# Patient Record
Sex: FEMALE | Race: WHITE | NOT HISPANIC OR LATINO | Employment: OTHER | URBAN - METROPOLITAN AREA
[De-identification: names, ages, dates, MRNs, and addresses within clinical notes are randomized per-mention and may not be internally consistent; named-entity substitution may affect disease eponyms.]

---

## 2019-11-19 ENCOUNTER — OFFICE VISIT (OUTPATIENT)
Dept: CARDIAC SURGERY | Facility: CLINIC | Age: 69
End: 2019-11-19

## 2019-11-19 VITALS
BODY MASS INDEX: 21.21 KG/M2 | OXYGEN SATURATION: 98 % | HEART RATE: 78 BPM | TEMPERATURE: 98.8 F | SYSTOLIC BLOOD PRESSURE: 138 MMHG | WEIGHT: 132 LBS | HEIGHT: 66 IN | DIASTOLIC BLOOD PRESSURE: 74 MMHG

## 2019-11-19 DIAGNOSIS — I35.9 AORTIC VALVE DISORDER: Primary | ICD-10-CM

## 2019-11-19 PROCEDURE — 99204 OFFICE O/P NEW MOD 45 MIN: CPT | Performed by: THORACIC SURGERY (CARDIOTHORACIC VASCULAR SURGERY)

## 2019-11-19 RX ORDER — ERGOCALCIFEROL 1.25 MG/1
CAPSULE ORAL
Refills: 2 | COMMUNITY
Start: 2019-11-07 | End: 2019-12-05

## 2019-11-19 RX ORDER — FEXOFENADINE HCL 180 MG/1
180 TABLET ORAL DAILY
COMMUNITY

## 2019-11-19 RX ORDER — IBUPROFEN 200 MG
200 TABLET ORAL
COMMUNITY
End: 2019-12-15

## 2019-11-20 ENCOUNTER — HOSPITAL ENCOUNTER (OUTPATIENT)
Dept: CARDIOLOGY | Facility: HOSPITAL | Age: 69
Discharge: HOME | End: 2019-11-20

## 2019-11-20 DIAGNOSIS — R42 LIGHT HEADEDNESS: ICD-10-CM

## 2019-11-20 DIAGNOSIS — Z00.6 EXAMINATION FOR NORMAL COMPARISON OR CONTROL IN CLINICAL RESEARCH: ICD-10-CM

## 2019-11-20 DIAGNOSIS — I35.9 AORTIC VALVE DISORDER: Primary | ICD-10-CM

## 2019-11-20 DIAGNOSIS — I35.0 AORTIC STENOSIS, SEVERE: ICD-10-CM

## 2019-11-20 NOTE — PROGRESS NOTES
TAVR DORA    Received referral for TAVR work up per Dr. Encinas.  See orders below for CTA, SAW, and carotid as patient has undergone cath and TTE per Dr. José @ [facility].  Spoke with patient to say that I will schedule these and contact her.  She states she was given a TAVR booklet yesterday and his reviewing the information and online data.      Penny JOHN

## 2019-11-22 ENCOUNTER — TELEPHONE (OUTPATIENT)
Dept: CARDIOLOGY | Facility: HOSPITAL | Age: 69
End: 2019-11-22

## 2019-11-22 NOTE — TELEPHONE ENCOUNTER
TAVR APRN    Spoke with patient re: upcoming SAW and CTA TAVR protocol.  Reviewed instructions and check in location/ time (9am) for SAW on 12/6/19 with Dr. Rodney.  NPO after MN and bring .      On 12/18/19 carotid doppler @ 8 am (check in between 7:30 and 7:45 am on third floor [address]/ CTA chest abdomen, and pelvis afterwards.  Stop in main registration to check in after carotid.  May have light breakfast before 8 am but no caffeine.      If above studies are acceptable, then patient will tentatively plan for TAVR next procedure day after CTA which is 1/13/20.  Patient was instructed to call me @ [facility] for new or worsening symptoms in the interim.      Penny JOHN

## 2019-11-25 ENCOUNTER — TELEPHONE (OUTPATIENT)
Dept: CARDIOLOGY | Facility: HOSPITAL | Age: 69
End: 2019-11-25

## 2019-11-25 NOTE — TELEPHONE ENCOUNTER
TAVR APRN    Called Centralized Scheduling to move CTA and Carotid to 12/5 so that patient would be eligible for TAVR 12/16 rather than January.  Will send patient printed schedule of dates/ times/ prep/ and check in locations.  Ms. Hernandez verbalized agreement and understanding

## 2019-12-05 ENCOUNTER — HOSPITAL ENCOUNTER (OUTPATIENT)
Dept: CARDIOLOGY | Facility: HOSPITAL | Age: 69
Discharge: HOME | End: 2019-12-05

## 2019-12-05 ENCOUNTER — OFFICE VISIT (OUTPATIENT)
Dept: CARDIOLOGY | Facility: HOSPITAL | Age: 69
End: 2019-12-05

## 2019-12-05 ENCOUNTER — HOSPITAL ENCOUNTER (OUTPATIENT)
Dept: CT IMAGING | Facility: HOSPITAL | Age: 69
Discharge: HOME | End: 2019-12-05
Admitting: NURSE PRACTITIONER

## 2019-12-05 VITALS
WEIGHT: 133.38 LBS | HEIGHT: 66 IN | HEART RATE: 70 BPM | DIASTOLIC BLOOD PRESSURE: 67 MMHG | BODY MASS INDEX: 21.44 KG/M2 | SYSTOLIC BLOOD PRESSURE: 122 MMHG | OXYGEN SATURATION: 97 % | TEMPERATURE: 98.4 F | RESPIRATION RATE: 18 BRPM

## 2019-12-05 VITALS
WEIGHT: 132.8 LBS | OXYGEN SATURATION: 97 % | HEART RATE: 58 BPM | TEMPERATURE: 97.6 F | BODY MASS INDEX: 21.43 KG/M2 | SYSTOLIC BLOOD PRESSURE: 141 MMHG | RESPIRATION RATE: 16 BRPM | DIASTOLIC BLOOD PRESSURE: 72 MMHG

## 2019-12-05 VITALS
BODY MASS INDEX: 21.21 KG/M2 | WEIGHT: 132 LBS | SYSTOLIC BLOOD PRESSURE: 140 MMHG | HEIGHT: 66 IN | DIASTOLIC BLOOD PRESSURE: 77 MMHG

## 2019-12-05 DIAGNOSIS — R42 LIGHT HEADEDNESS: ICD-10-CM

## 2019-12-05 DIAGNOSIS — I35.0 AORTIC STENOSIS, SEVERE: ICD-10-CM

## 2019-12-05 DIAGNOSIS — I35.9 AORTIC VALVE DISORDER: Primary | ICD-10-CM

## 2019-12-05 DIAGNOSIS — I50.32 CHRONIC DIASTOLIC CONGESTIVE HEART FAILURE (HCC): ICD-10-CM

## 2019-12-05 DIAGNOSIS — I35.0 SEVERE AORTIC STENOSIS: Primary | ICD-10-CM

## 2019-12-05 LAB
BH CV ECHO MEAS - BSA(HAYCOCK): 1.7 M^2
BH CV ECHO MEAS - BSA: 1.7 M^2
BH CV ECHO MEAS - BZI_BMI: 21.3 KILOGRAMS/M^2
BH CV ECHO MEAS - BZI_METRIC_HEIGHT: 167.6 CM
BH CV ECHO MEAS - BZI_METRIC_WEIGHT: 59.9 KG
BH CV XLRA MEAS LEFT CCA RATIO VEL: 84.5 CM/SEC
BH CV XLRA MEAS LEFT DIST CCA EDV: 30 CM/SEC
BH CV XLRA MEAS LEFT DIST CCA PSV: 78.2 CM/SEC
BH CV XLRA MEAS LEFT DIST ICA EDV: 30 CM/SEC
BH CV XLRA MEAS LEFT DIST ICA PSV: 99.2 CM/SEC
BH CV XLRA MEAS LEFT ICA RATIO VEL: 86.6 CM/SEC
BH CV XLRA MEAS LEFT ICA/CCA RATIO: 1
BH CV XLRA MEAS LEFT MID CCA EDV: 27.2 CM/SEC
BH CV XLRA MEAS LEFT MID CCA PSV: 85.2 CM/SEC
BH CV XLRA MEAS LEFT MID ICA EDV: 27.9 CM/SEC
BH CV XLRA MEAS LEFT MID ICA PSV: 78.9 CM/SEC
BH CV XLRA MEAS LEFT PROX CCA EDV: 34.2 CM/SEC
BH CV XLRA MEAS LEFT PROX CCA PSV: 134.8 CM/SEC
BH CV XLRA MEAS LEFT PROX ECA EDV: 23 CM/SEC
BH CV XLRA MEAS LEFT PROX ECA PSV: 83.1 CM/SEC
BH CV XLRA MEAS LEFT PROX ICA EDV: 31.4 CM/SEC
BH CV XLRA MEAS LEFT PROX ICA PSV: 87.3 CM/SEC
BH CV XLRA MEAS LEFT PROX SCLA PSV: 119.4 CM/SEC
BH CV XLRA MEAS LEFT VERTEBRAL A EDV: 30.7 CM/SEC
BH CV XLRA MEAS LEFT VERTEBRAL A PSV: 67 CM/SEC
BH CV XLRA MEAS RIGHT CCA RATIO VEL: 84.5 CM/SEC
BH CV XLRA MEAS RIGHT DIST CCA EDV: 30.7 CM/SEC
BH CV XLRA MEAS RIGHT DIST CCA PSV: 85.2 CM/SEC
BH CV XLRA MEAS RIGHT DIST ICA EDV: 35.6 CM/SEC
BH CV XLRA MEAS RIGHT DIST ICA PSV: 78.9 CM/SEC
BH CV XLRA MEAS RIGHT ICA RATIO VEL: 81.7 CM/SEC
BH CV XLRA MEAS RIGHT ICA/CCA RATIO: 0.97
BH CV XLRA MEAS RIGHT MID CCA EDV: 32.1 CM/SEC
BH CV XLRA MEAS RIGHT MID CCA PSV: 112.4 CM/SEC
BH CV XLRA MEAS RIGHT MID ICA EDV: 37 CM/SEC
BH CV XLRA MEAS RIGHT MID ICA PSV: 104.8 CM/SEC
BH CV XLRA MEAS RIGHT PROX CCA EDV: 38.3 CM/SEC
BH CV XLRA MEAS RIGHT PROX CCA PSV: 152.2 CM/SEC
BH CV XLRA MEAS RIGHT PROX ECA EDV: 21.6 CM/SEC
BH CV XLRA MEAS RIGHT PROX ECA PSV: 87.3 CM/SEC
BH CV XLRA MEAS RIGHT PROX ICA EDV: 32.8 CM/SEC
BH CV XLRA MEAS RIGHT PROX ICA PSV: 82.4 CM/SEC
BH CV XLRA MEAS RIGHT PROX SCLA PSV: 128.9 CM/SEC
BH CV XLRA MEAS RIGHT VERTEBRAL A EDV: 15.7 CM/SEC
BH CV XLRA MEAS RIGHT VERTEBRAL A PSV: 58.1 CM/SEC
RIGHT ARM BP: NORMAL MMHG

## 2019-12-05 PROCEDURE — A9270 NON-COVERED ITEM OR SERVICE: HCPCS | Performed by: RADIOLOGY

## 2019-12-05 PROCEDURE — 0 IOPAMIDOL PER 1 ML: Performed by: NURSE PRACTITIONER

## 2019-12-05 PROCEDURE — 93880 EXTRACRANIAL BILAT STUDY: CPT | Performed by: INTERNAL MEDICINE

## 2019-12-05 PROCEDURE — 99214 OFFICE O/P EST MOD 30 MIN: CPT | Performed by: NURSE PRACTITIONER

## 2019-12-05 PROCEDURE — 74174 CTA ABD&PLVS W/CONTRAST: CPT

## 2019-12-05 PROCEDURE — 63710000001 METOPROLOL TARTRATE 50 MG TABLET: Performed by: RADIOLOGY

## 2019-12-05 PROCEDURE — 71275 CT ANGIOGRAPHY CHEST: CPT

## 2019-12-05 PROCEDURE — 82565 ASSAY OF CREATININE: CPT

## 2019-12-05 PROCEDURE — 93880 EXTRACRANIAL BILAT STUDY: CPT

## 2019-12-05 RX ORDER — SODIUM CHLORIDE 0.9 % (FLUSH) 0.9 %
10 SYRINGE (ML) INJECTION AS NEEDED
Status: DISCONTINUED | OUTPATIENT
Start: 2019-12-05 | End: 2019-12-06 | Stop reason: HOSPADM

## 2019-12-05 RX ORDER — NITROGLYCERIN 0.4 MG/1
0.4 TABLET SUBLINGUAL
Status: DISCONTINUED | OUTPATIENT
Start: 2019-12-05 | End: 2019-12-06 | Stop reason: HOSPADM

## 2019-12-05 RX ORDER — METOPROLOL TARTRATE 100 MG/1
100 TABLET ORAL ONCE AS NEEDED
Status: COMPLETED | OUTPATIENT
Start: 2019-12-05 | End: 2019-12-05

## 2019-12-05 RX ORDER — METOPROLOL TARTRATE 50 MG/1
50 TABLET, FILM COATED ORAL ONCE AS NEEDED
Status: COMPLETED | OUTPATIENT
Start: 2019-12-05 | End: 2019-12-05

## 2019-12-05 RX ORDER — METOPROLOL TARTRATE 5 MG/5ML
5 INJECTION INTRAVENOUS
Status: DISCONTINUED | OUTPATIENT
Start: 2019-12-05 | End: 2019-12-06 | Stop reason: HOSPADM

## 2019-12-05 RX ADMIN — IOPAMIDOL 85 ML: 755 INJECTION, SOLUTION INTRAVENOUS at 10:04

## 2019-12-05 RX ADMIN — METOPROLOL TARTRATE 50 MG: 50 TABLET, FILM COATED ORAL at 08:56

## 2019-12-05 NOTE — NURSING NOTE
Returned to room. Tolerated procedure well. Instructed to drink at least 8 - 10 glasses of fluid per day for the next few days. Call placed to JUANA Lux for further instructions.

## 2019-12-06 ENCOUNTER — HOSPITAL ENCOUNTER (OUTPATIENT)
Dept: CARDIOLOGY | Facility: HOSPITAL | Age: 69
Discharge: HOME | End: 2019-12-06
Admitting: NURSE PRACTITIONER

## 2019-12-06 VITALS
DIASTOLIC BLOOD PRESSURE: 70 MMHG | RESPIRATION RATE: 18 BRPM | OXYGEN SATURATION: 94 % | SYSTOLIC BLOOD PRESSURE: 114 MMHG | HEART RATE: 71 BPM

## 2019-12-06 DIAGNOSIS — R42 LIGHT HEADEDNESS: ICD-10-CM

## 2019-12-06 DIAGNOSIS — I35.0 AORTIC STENOSIS, SEVERE: ICD-10-CM

## 2019-12-06 LAB
AORTIC ROOT ANNULUS: 2.5 CM
ASCENDING AORTA: 3.78 CM
BH CV ECHO MEAS - AO MAX PG (FULL): 113.8 MMHG
BH CV ECHO MEAS - AO MAX PG: 118 MMHG
BH CV ECHO MEAS - AO MEAN PG (FULL): 68.6 MMHG
BH CV ECHO MEAS - AO MEAN PG: 71.2 MMHG
BH CV ECHO MEAS - AO ROOT AREA (BSA CORRECTED): 0.3 CM2
BH CV ECHO MEAS - AO V2 MAX: 543 CM/SEC
BH CV ECHO MEAS - AO V2 MEAN: 392.5 CM/SEC
BH CV ECHO MEAS - AO V2 VTI: 127.1 CM
BH CV ECHO MEAS - AVA(I,A): 0.5 CM^2
BH CV ECHO MEAS - AVA(I,D): 0.5 CM^2
BH CV ECHO MEAS - AVA(V,A): 0.56 CM^2
BH CV ECHO MEAS - AVA(V,D): 0.56 CM^2
BH CV ECHO MEAS - BSA(HAYCOCK): 1.7 M^2
BH CV ECHO MEAS - BSA: 1.7 M^2
BH CV ECHO MEAS - BZI_BMI: 21.5 KILOGRAMS/M^2
BH CV ECHO MEAS - BZI_METRIC_HEIGHT: 167.6 CM
BH CV ECHO MEAS - BZI_METRIC_WEIGHT: 60.3 KG
BH CV ECHO MEAS - LV MAX PG: 4.2 MMHG
BH CV ECHO MEAS - LV MEAN PG: 2.6 MMHG
BH CV ECHO MEAS - LV V1 MAX: 102 CM/SEC
BH CV ECHO MEAS - LV V1 MEAN: 75.8 CM/SEC
BH CV ECHO MEAS - LV V1 VTI: 21.6 CM
BH CV ECHO MEAS - LVOT AREA (M): 2.8 CM^2
BH CV ECHO MEAS - LVOT AREA: 3 CM^2
BH CV ECHO MEAS - LVOT DIAM: 1.9 CM
BH CV ECHO MEAS - SI(LVOT): 38.1 ML/M^2
BH CV ECHO MEAS - SV(LVOT): 64 ML
CREAT BLDA-MCNC: 0.8 MG/DL (ref 0.6–1.3)
LV EF 2D ECHO EST: 60 %
SINUS: 3.21 CM
STJ: 2.45 CM

## 2019-12-06 PROCEDURE — 93312 ECHO TRANSESOPHAGEAL: CPT

## 2019-12-06 PROCEDURE — 25010000002 MIDAZOLAM PER 1 MG: Performed by: INTERNAL MEDICINE

## 2019-12-06 PROCEDURE — 93312 ECHO TRANSESOPHAGEAL: CPT | Performed by: INTERNAL MEDICINE

## 2019-12-06 PROCEDURE — 93321 DOPPLER ECHO F-UP/LMTD STD: CPT

## 2019-12-06 PROCEDURE — 99152 MOD SED SAME PHYS/QHP 5/>YRS: CPT

## 2019-12-06 PROCEDURE — 93325 DOPPLER ECHO COLOR FLOW MAPG: CPT

## 2019-12-06 PROCEDURE — 99153 MOD SED SAME PHYS/QHP EA: CPT

## 2019-12-06 PROCEDURE — 25010000002 FENTANYL CITRATE (PF) 100 MCG/2ML SOLUTION: Performed by: INTERNAL MEDICINE

## 2019-12-06 PROCEDURE — 93321 DOPPLER ECHO F-UP/LMTD STD: CPT | Performed by: INTERNAL MEDICINE

## 2019-12-06 PROCEDURE — 93325 DOPPLER ECHO COLOR FLOW MAPG: CPT | Performed by: INTERNAL MEDICINE

## 2019-12-06 PROCEDURE — 99152 MOD SED SAME PHYS/QHP 5/>YRS: CPT | Performed by: INTERNAL MEDICINE

## 2019-12-06 RX ORDER — MIDAZOLAM HYDROCHLORIDE 1 MG/ML
INJECTION INTRAMUSCULAR; INTRAVENOUS
Status: COMPLETED | OUTPATIENT
Start: 2019-12-06 | End: 2019-12-06

## 2019-12-06 RX ORDER — FENTANYL CITRATE 50 UG/ML
INJECTION, SOLUTION INTRAMUSCULAR; INTRAVENOUS
Status: COMPLETED | OUTPATIENT
Start: 2019-12-06 | End: 2019-12-06

## 2019-12-06 RX ADMIN — MIDAZOLAM HYDROCHLORIDE 2 MG: 1 INJECTION, SOLUTION INTRAMUSCULAR; INTRAVENOUS at 10:09

## 2019-12-06 RX ADMIN — METHOHEXITAL SODIUM 10 MG: 500 INJECTION, POWDER, LYOPHILIZED, FOR SOLUTION INTRAMUSCULAR; INTRAVENOUS; RECTAL at 10:32

## 2019-12-06 RX ADMIN — FENTANYL CITRATE 50 MCG: 50 INJECTION, SOLUTION INTRAMUSCULAR; INTRAVENOUS at 10:12

## 2019-12-06 RX ADMIN — METHOHEXITAL SODIUM 10 MG: 500 INJECTION, POWDER, LYOPHILIZED, FOR SOLUTION INTRAMUSCULAR; INTRAVENOUS; RECTAL at 10:17

## 2019-12-06 RX ADMIN — FENTANYL CITRATE 50 MCG: 50 INJECTION, SOLUTION INTRAMUSCULAR; INTRAVENOUS at 10:09

## 2019-12-06 RX ADMIN — MIDAZOLAM HYDROCHLORIDE 2 MG: 1 INJECTION, SOLUTION INTRAMUSCULAR; INTRAVENOUS at 10:12

## 2019-12-06 NOTE — H&P
Pre-procedure History and Physical  [facility]      Patient:  Albania Hernandez  :  1950  MRN: [id number]    PCP:  Kimberly Anglin FNP  PHONE:  [phone]  Cardiologist: Dr. Merrill Trent ([facility])    DATE: 2019  ID: Albania Hernandez is a 69 y.o. female resident of [place], [place]     CC: Severe AS    PROBLEM LIST:   1. Severe aortic stenosis  A. LHC 2019 [facility]: nonobstructive CAD with 20-30% mid LAD lesion   B. Echo 10/31/2019: mild LVH, EF 55-60%, mild mR, calcified AV, severe AS with BALDO =0.62cm2, mean gradient 70mmHg, max gradient 120mmHg, trace AI  C. Class II VELAZQUEZ  2. History of hiatal hernia, s/p repair   3. Arthritis  4. Osteoporosis      BRIEF HPI: Mrs. Hernandez is a pleasant 68 y/o WF with recently diagnosed severe AS. She reports she has been told of a murmur for 45 +years, however has never had an echo done until recently. She has been asymptomatic, however does report class II exertional dyspnea and fatigue. 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 She denies chest pain, syncope or pre-syncope, no heart failure symptoms. She was evaluated by Dr. Trent at [facility] and an echo showed severe AS as noted above. She underwent cardiac catheterization which showed nonobstructive CAD, and was referred to Dr. Encinas for AVR. She was given the option of surgical replacement vs TAVR and opted for the latter. She presents for SAW today as part of TAVR work-up. No tobacco, alcohol or drug use. No history of MI, CVA, DVT/PE or rheumatic fever. Family history is significant for CAD in her siblings and father.     Cardiac Risk Factors: advanced age (older than 55 for men, 65 for women) and family history of premature cardiovascular disease    Allergies:      No Known Allergies    MEDICATIONS:  Current Outpatient Medications on File Prior to Encounter   Medication Sig   • calcium acetate (PHOSLO) 667 MG capsule Take 1,200 mg by mouth.   •  fexofenadine (ALLEGRA) 180 MG tablet Take 180 mg by mouth.   • ibuprofen (ADVIL,MOTRIN) 200 MG tablet Take 200 mg by mouth.       Past medical & surgical history, social and family history reviewed in the electronic medical record.    ROS: Pertinent positives listed in the HPI and problem list above. All others reviewed and negative.     Physical Exam:  VS: /92 left arm sitting, 135/76 right arm sitting, Pulse 69   Resp 20   SpO2 95%     Constitutional:    Alert, cooperative, in no acute distress   Neck:     No Jugular venous distention, adenopathy, or thyromegaly noted.     Heart:    Regular rhythm and normal rate, normal S1 and S2, 3/6 systolic murmur, no gallops, rubs, or clicks. No distinct PMI noted.    Lungs:     Clear to auscultation bilaterally, respirations regular, even     and unlabored    Abdomen:     Soft non-tender, non-distended, normal bowel sounds, no masses or organomegaly   Extremities:   No gross deformities, no edema, clubbing, or cyanosis.    Pulses:   Peripheral pulses palpable and equal bilaterally.       Labs and Diagnostic Data:  Results from last 7 days   Lab Units 12/05/19  0915   CREATININE mg/dL 0.80         No results found for: CHOL, TRIG, HDL, LDL, AST, ALT              Tele: SR    IMPRESSION:  · 68 y/o WF with no significant medical history and lifelong cardiac murmur with recent diagnosis of severe AS. She presents for SAW today as part of TAVR work-up.     PLAN:  · Procedure to perform: SAW per Dr. Rodney.  Risks, benefits and alternatives to the procedure explained to the patient and she understands and wishes to proceed.     Electronically signed by Essence Raphael PA-C, 12/06/19, 9:58 AM.    Veronica Rodney MD, [facility]

## 2019-12-09 ENCOUNTER — PREP FOR SURGERY (OUTPATIENT)
Dept: OTHER | Facility: HOSPITAL | Age: 69
End: 2019-12-09

## 2019-12-09 DIAGNOSIS — I35.9 AORTIC VALVE DISORDER: Primary | ICD-10-CM

## 2019-12-09 RX ORDER — ACETAMINOPHEN 325 MG/1
650 TABLET ORAL EVERY 4 HOURS PRN
Status: CANCELLED | OUTPATIENT
Start: 2019-12-16

## 2019-12-09 RX ORDER — ASPIRIN 325 MG
325 TABLET ORAL NIGHTLY
Status: CANCELLED | OUTPATIENT
Start: 2019-12-15 | End: 2019-12-16

## 2019-12-09 RX ORDER — CHLORHEXIDINE GLUCONATE 0.12 MG/ML
15 RINSE ORAL ONCE
Status: CANCELLED | OUTPATIENT
Start: 2019-12-16 | End: 2019-12-16

## 2019-12-09 RX ORDER — CHLORHEXIDINE GLUCONATE 500 MG/1
1 CLOTH TOPICAL EVERY 12 HOURS PRN
Status: CANCELLED | OUTPATIENT
Start: 2019-12-16

## 2019-12-09 RX ORDER — NITROGLYCERIN 0.4 MG/1
0.4 TABLET SUBLINGUAL
Status: CANCELLED | OUTPATIENT
Start: 2019-12-16

## 2019-12-09 RX ORDER — CHLORHEXIDINE GLUCONATE 500 MG/1
1 CLOTH TOPICAL EVERY 12 HOURS PRN
Status: CANCELLED | OUTPATIENT
Start: 2019-12-15

## 2019-12-10 PROBLEM — I50.32 CHRONIC DIASTOLIC CONGESTIVE HEART FAILURE: Status: ACTIVE | Noted: 2019-12-10

## 2019-12-10 PROBLEM — I35.0 SEVERE AORTIC STENOSIS: Status: ACTIVE | Noted: 2019-11-19

## 2019-12-10 NOTE — PROGRESS NOTES
"TAVR APRN Evaluation    Albania Hernandez, 1950, [id number]     12/10/19    PCP: Kimberly Anglin FNP  Primary Cardiologist: Camilo Trent MD    TAVR Team:  1.  Major Rodríguez MD  2.  Pierce Encinas MD  3.  Veronica Rodney MD  4.  Augustin aBker MD    Chief Complaint: Establish Care (TVAR Consult)      Identification: This is a 69 y.o. year old female from [address] [place] [address] [place] [id number].    History of Present Illness: Ms. Hernandez was referred for consideration of TAVR due to severe aortic stenosis noted per echocardiogram.  Patient states she visited her PCP several weeks ago and was asked when she last saw her Cardiologist.  She stated she has never seen cardiology and had been told she had an \"innocent murmur\" many years ago by OB/GYN.  Her symptoms include fatigue and mild exertional dyspnea.  Her aortic valve indices are as follows:  BALDO 0.6 cm2, Aortic valve Vmax 5.3 m/sec, and AV mean gradient 79 mm Hg.    PCP referred her to Dr. Camilo Trent @ [facility].  Cardiac catheter revealed normal coronary arteries.  Aortic valve assessment at cath indicated BALDO 0.6 cm2, AV mean >55 mm Hg.  Patient's best friend had an procedure earlier this year per Dr. Encinas and she requested to have a consultation with him.  Earlier today she completed her CTA and is scheduled for her SAW in the morning.  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  After a busy day working she \"falls asleep\".      Problem List:   Patient Active Problem List   Diagnosis   • Severe aortic stenosis   • Chronic diastolic congestive heart failure (CMS/HCC)       Past Surgical History:  Past Surgical History:   Procedure Laterality Date   • COLONOSCOPY W/ BIOPSIES     • HERNIA REPAIR     • TUBAL ABDOMINAL LIGATION         Allergies:  Patient has no known allergies.    Social History:  Socioeconomic History   • Marital status:    • Number of " children: 2   • Highest education level: Master's degree   Occupational History   • Occupation: Teacher     Comment: retired  from [employer]   Tobacco Use   • Smoking status: Never Smoker   • Smokeless tobacco: Never Used   Substance and Sexual Activity   • Alcohol use: No     Frequency: Never   • Drug use: No   • Sexual activity: Defer   Social History Narrative    Lifelong resident of  [facility]    Caffeine use:  Coke 12oz cans/ two daily       Current Medications:  •  calcium acetate (PHOSLO) 667 MG capsule, Take 1,200 mg by mouth., Disp: , Rfl:   •  fexofenadine (ALLEGRA) 180 MG tablet, Take 180 mg by mouth., Disp: , Rfl:   •  ibuprofen (ADVIL,MOTRIN) 200 MG tablet, Take 200 mg by mouth., Disp: , Rfl:     Review of Systems:  Review of Systems   Constitution: Positive for malaise/fatigue.   HENT: Negative.    Eyes: Negative.    Cardiovascular: Positive for dyspnea on exertion.   Respiratory: Negative.    Endocrine: Positive for cold intolerance.   Hematologic/Lymphatic: Bruises/bleeds easily.   Skin: Negative.    Musculoskeletal: Negative.    Gastrointestinal: Negative.    Genitourinary: Negative.    Neurological: Negative.    Psychiatric/Behavioral: Negative.    Allergic/Immunologic: Negative.         Physical Exam:  Physical Exam   Constitutional: She is oriented to person, place, and time. She appears well-developed and well-nourished. No distress.   Petite, well groomed talkative middle age female in NAD   HENT:   Head: Normocephalic and atraumatic.   Eyes: Pupils are equal, round, and reactive to light. Conjunctivae are normal. No scleral icterus.   Neck: Normal range of motion. Neck supple. No JVD present. No thyromegaly present.   Cardiac murmur radiates to both carotids   Cardiovascular: Normal rate, regular rhythm and intact distal pulses. Exam reveals no gallop and no friction rub.   Murmur heard.  Harsh IV/VI systolic murmur LUSB   Pulmonary/Chest: Effort  "normal and breath sounds normal. No respiratory distress. She has no wheezes. She has no rales.   Abdominal: Soft. Bowel sounds are normal.   Musculoskeletal: Normal range of motion. She exhibits no edema or deformity.   Neurological: She is alert and oriented to person, place, and time. No cranial nerve deficit.   Skin: Skin is warm and dry.   Psychiatric: She has a normal mood and affect. Her behavior is normal. Judgment and thought content normal.   Vitals reviewed.      Vitals:    12/05/19 1156 12/05/19 1202 12/05/19 1203   BP: 130/69 125/66 122/67   BP Location: Right arm Left arm Left arm   Patient Position: Sitting Sitting Standing   Cuff Size: Adult Adult Adult   Pulse: 67  70   Resp: 18     Temp: 98.4 °F (36.9 °C)     TempSrc: Temporal     SpO2: 99%  97%   Weight: 60.5 kg (133 lb 6 oz)     Height: 167.6 cm (66\")         Diagnostic Data:  Transthoracic echo: 10/31/19 as noted above in HPI    Transesophageal echo: 12/6/19     · Estimated EF = 60%.  · Left ventricular systolic function is normal.  · Severe aortic valve stenosis is present.  · Aortic valve mean pressure gradient is 71.2 mmHg.  · Aortic valve area is 0.5 cm2.     Cardiac Cath: 11/13/19 per Dr. Camilo Trent @ [facility].  Non-critical, non-obstructive CAD in mid LAD with focal 20-30% stenosis    CTA Chest, Abdomen, and Pelvis: pending    Carotid Doppler: no hemodynamically significant stenosis bilaterally      Functional Assessment Data:    KCCQ12 Questionnaire Score: (see scanned copy) 61/70      [place] Basic Activities of Daily Living (ADL) Scale    Bathing (sponge bath, tub bath, or shower).  Receives either no assistance,   or assistance with bathing only on body part.   Yes    Dressing Gets clothes and dresses without any assistance, except for  tying shoes.        Yes    Toileting Goes to toilet room, uses toilet, arranges clothes, and returns  without any assistance (may use cane or walker for support and may use  bedpan / urinal at " night.      Yes    Transferring Moves into and out of bed and chair without assistance  (may use cane or walker)      Yes    Continence Controls bowel and bladder completely without occasional  accidents        Yes    Feeding Feeds self without assistance (except for help with cutting meat  or buttering bread)       Yes        Total (Number of Yesses of 6) 6/6      [place] Instrumental Activities of Daily Living Scale (IADL)    Ability to Use Telephone   · Operates telephone on own initiative.  Looks up and dials numbers, etc.         1  Shopping  · Takes care of all shopping needs indepently  1    Food Preparation  · Plans, prepares, and serves adequate meals independently          1  Housekeeping  · Maintains house alone or with occasional assistance,   e.g. heavy work domestic help    1     Laundry  Does personal laundry completely   1     Mode of Transportation  · Travels independently on public transportation or drives own car          1  Responsibility for Own Medications  · Is responsible for taking medications in correct dosages at correct time          1  Ability to Handle Finances  · Manages financial matters independently (budgets, writes checks,   pays rent, bills, goes to bank), collects and keeps track of income          1     Total (Number of Instrumental Activities of 8) 8/8       Frailty Assessment Utilizing the Hydraulic Hand Dynamometer    Dominate Hand: Right    Trail 1:  24 kg    Trial 2: 24 kg    Trial 3: 22 kg    Mean  Strength in Dominate Hand: 23.3 kg (mean age/gender 15-30 kg)      Female Scores  Male Scores   Age Group Right Hand Left Hand  Age Group Right Hand Left Hand   18-19 23-42 19-37  18-19 31-67 21-63   20-24 21-43 18-37  20-24 40-70 31-64   25-29 23-44 20-38  25-29 38-72 38-62   30-34 21-50 18-44  30-34 38-72 34-66   35-39 26-42 21-39  35-39 36-72 35-67   40-44 22-42 18-39  40-44 37-69 37-65   45-49 17-39 16-35  45-49 33-67 29-63   50-54 21-39 18-34  50-54 38-65 33-59    55-59 17-35 13-30  55-59 26-66 20-55   60-64 17-33 13-28  60-64 25-56 20-50   65-69 15-30 12-25  65-69 26-57 20-50   70-74 14-31 11-26  70-74 18-50 16-43   75 11-28 11-24  75 14-45 12-38       Five Meter Walk Test    Utilized Walking Aid? No     Walk 1: 4.7 s/5m     Walk 2: 4.3 s/5m     Walk 3: 5.03 s/5m    Five Meter Walk Average: 4.7 s/5m    Gait Speed: Normal (Average < or = 6 s/5m)      STS risk of mortality with open AVR: 0.67%  http://riskcalc.sts.org/stswebriskcalc/calculate       Creatinine Clearance: 63 ml/min  https://reference.[employer]/calculator/creatinine-clearance-cockcroft-gault    Assessment/ Plan:     ICD-10-CM ICD-9-CM   1. Severe aortic stenosis I35.0 424.1   2. Chronic diastolic congestive heart failure (CMS/HCC).  NYHA class I I50.32 428.32     428.0     TAVR education materials reviewed with patient today.  This included printed brochure detailing pathophysiology of aortic stenosis, patient specific aortic stenosis symptoms, and treatment options (medical therapy, surgical aortic valve replacement, and transcatheter aortic valve replacement).  A model of the valve and procedural animation were also used to explain TAVR.  We discussed patient's goals of care:  Continue living and working to support granddaughter and care for aging mother.      Our talk also included procedural details, procedure risks, anticipated pre-op and post-op expectations, as well as follow up visit schedule @ one month and one year.  Further discussion and final decision making will occur with Multi- Disciplinary Heart Team.  Anticipated procedure date is 12/16/19     DORA Copeland, 12/10/19, 3:18 PM

## 2019-12-13 ENCOUNTER — TELEPHONE (OUTPATIENT)
Dept: CARDIOLOGY | Facility: HOSPITAL | Age: 69
End: 2019-12-13

## 2019-12-13 NOTE — TELEPHONE ENCOUNTER
Attempted to call patient both at home and on mobile to review PAT appt and answer questions regarding TAVR. Left voicemail.

## 2019-12-13 NOTE — TELEPHONE ENCOUNTER
Returned patient's call. Reviewed pre TAVR expectations. Confirmed date for PAT 12/15/19 at 11.45 am

## 2019-12-15 ENCOUNTER — APPOINTMENT (OUTPATIENT)
Dept: PREADMISSION TESTING | Facility: HOSPITAL | Age: 69
End: 2019-12-15

## 2019-12-15 ENCOUNTER — HOSPITAL ENCOUNTER (OUTPATIENT)
Dept: GENERAL RADIOLOGY | Facility: HOSPITAL | Age: 69
Discharge: HOME | End: 2019-12-15

## 2019-12-15 ENCOUNTER — HOSPITAL ENCOUNTER (OUTPATIENT)
Dept: PULMONOLOGY | Facility: HOSPITAL | Age: 69
Discharge: HOME | End: 2019-12-15
Admitting: PHYSICIAN ASSISTANT

## 2019-12-15 VITALS — HEIGHT: 66 IN | BODY MASS INDEX: 21.51 KG/M2 | OXYGEN SATURATION: 98 % | WEIGHT: 133.82 LBS

## 2019-12-15 DIAGNOSIS — I50.32 CHRONIC DIASTOLIC CONGESTIVE HEART FAILURE (HCC): ICD-10-CM

## 2019-12-15 DIAGNOSIS — I35.9 AORTIC VALVE DISORDER: ICD-10-CM

## 2019-12-15 LAB
ABO GROUP BLD: NORMAL
ALBUMIN SERPL-MCNC: 4.4 G/DL (ref 3.5–5.2)
ALBUMIN/GLOB SERPL: 2 G/DL
ALP SERPL-CCNC: 60 U/L (ref 39–117)
ALT SERPL W P-5'-P-CCNC: 13 U/L (ref 1–33)
AMPHET+METHAMPHET UR QL: NEGATIVE
AMPHETAMINES UR QL: NEGATIVE
ANION GAP SERPL CALCULATED.3IONS-SCNC: 10 MMOL/L (ref 5–15)
APTT PPP: 27.5 SECONDS (ref 24–37)
AST SERPL-CCNC: 14 U/L (ref 1–32)
BARBITURATES UR QL SCN: NEGATIVE
BASOPHILS # BLD AUTO: 0.03 10*3/MM3 (ref 0–0.2)
BASOPHILS NFR BLD AUTO: 0.7 % (ref 0–1.5)
BENZODIAZ UR QL SCN: NEGATIVE
BILIRUB SERPL-MCNC: 0.4 MG/DL (ref 0.2–1.2)
BLD GP AB SCN SERPL QL: NEGATIVE
BUN BLD-MCNC: 11 MG/DL (ref 8–23)
BUN/CREAT SERPL: 14.7 (ref 7–25)
BUPRENORPHINE SERPL-MCNC: NEGATIVE NG/ML
CALCIUM SPEC-SCNC: 9.6 MG/DL (ref 8.6–10.5)
CANNABINOIDS SERPL QL: NEGATIVE
CHLORIDE SERPL-SCNC: 107 MMOL/L (ref 98–107)
CO2 SERPL-SCNC: 26 MMOL/L (ref 22–29)
COCAINE UR QL: NEGATIVE
CREAT BLD-MCNC: 0.75 MG/DL (ref 0.57–1)
DEPRECATED RDW RBC AUTO: 43.2 FL (ref 37–54)
EOSINOPHIL # BLD AUTO: 0.09 10*3/MM3 (ref 0–0.4)
EOSINOPHIL NFR BLD AUTO: 2.1 % (ref 0.3–6.2)
ERYTHROCYTE [DISTWIDTH] IN BLOOD BY AUTOMATED COUNT: 12.8 % (ref 12.3–15.4)
GFR SERPL CREATININE-BSD FRML MDRD: 77 ML/MIN/1.73
GLOBULIN UR ELPH-MCNC: 2.2 GM/DL
GLUCOSE BLD-MCNC: 79 MG/DL (ref 65–99)
HBA1C MFR BLD: 5.4 % (ref 4.8–5.6)
HCT VFR BLD AUTO: 39.4 % (ref 34–46.6)
HGB BLD-MCNC: 12.7 G/DL (ref 12–15.9)
IMM GRANULOCYTES # BLD AUTO: 0.01 10*3/MM3 (ref 0–0.05)
IMM GRANULOCYTES NFR BLD AUTO: 0.2 % (ref 0–0.5)
INR PPP: 0.97 (ref 0.85–1.16)
LYMPHOCYTES # BLD AUTO: 1.46 10*3/MM3 (ref 0.7–3.1)
LYMPHOCYTES NFR BLD AUTO: 34.8 % (ref 19.6–45.3)
MAGNESIUM SERPL-MCNC: 2.1 MG/DL (ref 1.6–2.4)
MCH RBC QN AUTO: 29.7 PG (ref 26.6–33)
MCHC RBC AUTO-ENTMCNC: 32.2 G/DL (ref 31.5–35.7)
MCV RBC AUTO: 92.3 FL (ref 79–97)
METHADONE UR QL SCN: NEGATIVE
MONOCYTES # BLD AUTO: 0.37 10*3/MM3 (ref 0.1–0.9)
MONOCYTES NFR BLD AUTO: 8.8 % (ref 5–12)
NEUTROPHILS # BLD AUTO: 2.24 10*3/MM3 (ref 1.7–7)
NEUTROPHILS NFR BLD AUTO: 53.4 % (ref 42.7–76)
NRBC BLD AUTO-RTO: 0 /100 WBC (ref 0–0.2)
OPIATES UR QL: NEGATIVE
OXYCODONE UR QL SCN: NEGATIVE
PA ADP PRP-ACNC: 297 PRU
PCP UR QL SCN: NEGATIVE
PLATELET # BLD AUTO: 195 10*3/MM3 (ref 140–450)
PMV BLD AUTO: 11.4 FL (ref 6–12)
POTASSIUM BLD-SCNC: 3.5 MMOL/L (ref 3.5–5.2)
PROPOXYPH UR QL: NEGATIVE
PROT SERPL-MCNC: 6.6 G/DL (ref 6–8.5)
PROTHROMBIN TIME: 12.4 SECONDS (ref 11.2–14.3)
RBC # BLD AUTO: 4.27 10*6/MM3 (ref 3.77–5.28)
RH BLD: NEGATIVE
SODIUM BLD-SCNC: 143 MMOL/L (ref 136–145)
T&S EXPIRATION DATE: NORMAL
TRICYCLICS UR QL SCN: NEGATIVE
WBC NRBC COR # BLD: 4.2 10*3/MM3 (ref 3.4–10.8)

## 2019-12-15 PROCEDURE — 71046 X-RAY EXAM CHEST 2 VIEWS: CPT

## 2019-12-15 PROCEDURE — 86901 BLOOD TYPING SEROLOGIC RH(D): CPT | Performed by: PHYSICIAN ASSISTANT

## 2019-12-15 PROCEDURE — 93005 ELECTROCARDIOGRAM TRACING: CPT

## 2019-12-15 PROCEDURE — 83036 HEMOGLOBIN GLYCOSYLATED A1C: CPT | Performed by: PHYSICIAN ASSISTANT

## 2019-12-15 PROCEDURE — 94010 BREATHING CAPACITY TEST: CPT | Performed by: INTERNAL MEDICINE

## 2019-12-15 PROCEDURE — 36415 COLL VENOUS BLD VENIPUNCTURE: CPT

## 2019-12-15 PROCEDURE — 86850 RBC ANTIBODY SCREEN: CPT | Performed by: PHYSICIAN ASSISTANT

## 2019-12-15 PROCEDURE — 85610 PROTHROMBIN TIME: CPT | Performed by: PHYSICIAN ASSISTANT

## 2019-12-15 PROCEDURE — 83880 ASSAY OF NATRIURETIC PEPTIDE: CPT | Performed by: PHYSICIAN ASSISTANT

## 2019-12-15 PROCEDURE — 86900 BLOOD TYPING SEROLOGIC ABO: CPT | Performed by: PHYSICIAN ASSISTANT

## 2019-12-15 PROCEDURE — 85576 BLOOD PLATELET AGGREGATION: CPT | Performed by: PHYSICIAN ASSISTANT

## 2019-12-15 PROCEDURE — 85730 THROMBOPLASTIN TIME PARTIAL: CPT | Performed by: PHYSICIAN ASSISTANT

## 2019-12-15 PROCEDURE — 85025 COMPLETE CBC W/AUTO DIFF WBC: CPT | Performed by: PHYSICIAN ASSISTANT

## 2019-12-15 PROCEDURE — 93010 ELECTROCARDIOGRAM REPORT: CPT | Performed by: INTERNAL MEDICINE

## 2019-12-15 PROCEDURE — 86923 COMPATIBILITY TEST ELECTRIC: CPT

## 2019-12-15 PROCEDURE — 94010 BREATHING CAPACITY TEST: CPT

## 2019-12-15 PROCEDURE — 80307 DRUG TEST PRSMV CHEM ANLYZR: CPT | Performed by: PHYSICIAN ASSISTANT

## 2019-12-15 PROCEDURE — 83735 ASSAY OF MAGNESIUM: CPT | Performed by: PHYSICIAN ASSISTANT

## 2019-12-15 PROCEDURE — 80053 COMPREHEN METABOLIC PANEL: CPT | Performed by: PHYSICIAN ASSISTANT

## 2019-12-15 NOTE — PAT
Instructed patient to take 325 mg the night before heart surgery as per CT surgeon's order.  Patient and/or family verbalized understanding.    BACTROBAN APPLIED TO EACH NOSTRIL DURING PAT VISIT     Patient to apply Chlorhexadine wipes  to surgical area (as instructed) the night before procedure and the AM of procedure. Wipes provided.    It was noted during Pre Admission Testing that patient was wearing some form of fingernail polish (gel/regular) and/or acrylic/artificial nails.  Patient was told that polish and/or artificial nails must be removed for surgery.  If a patient had recent manicure, and would rather not remove polish or artificial nails. Then the minimum requirement is that the polish/artificial nails must be removed from the middle finger on each hand.  Patient verbalized understanding.    If patient was having surgery on an upper extremity, then the patient was instructed that fingernail polish/artificial fingernails must be removed for surgery.  NO EXCEPTIONS.  Patient verbalized understanding.    If patient was having surgery on a lower extremity, then the patient was instructed that toenail polish on both extremities must be removed for surgery.  NO EXCEPTIONS. Patient verbalized understanding.    Blood bank bracelet applied to patient during Pre Admission Testing visit.  Patient instructed not to remove from arm until after procedure and they are discharged from the hospital.  Explained to patient that they may shower and get the bracelet wet, but not to immerse under water for longer periods (bathing, swimming, hand dishwashing, etc).  Patient verbalized understanding.    RT to PAT for PFT's.    Patient to radiology following PAT for CXR.

## 2019-12-16 ENCOUNTER — ANESTHESIA (OUTPATIENT)
Dept: PERIOP | Facility: HOSPITAL | Age: 69
End: 2019-12-16

## 2019-12-16 ENCOUNTER — ANESTHESIA EVENT (OUTPATIENT)
Dept: PERIOP | Facility: HOSPITAL | Age: 69
End: 2019-12-16

## 2019-12-16 ENCOUNTER — ANCILLARY PROCEDURE (OUTPATIENT)
Dept: PERIOP | Facility: HOSPITAL | Age: 69
End: 2019-12-16

## 2019-12-16 ENCOUNTER — APPOINTMENT (OUTPATIENT)
Dept: GENERAL RADIOLOGY | Facility: HOSPITAL | Age: 69
End: 2019-12-16

## 2019-12-16 ENCOUNTER — HOSPITAL ENCOUNTER (INPATIENT)
Facility: HOSPITAL | Age: 69
LOS: 1 days | Discharge: HOME | End: 2019-12-17
Attending: THORACIC SURGERY (CARDIOTHORACIC VASCULAR SURGERY) | Admitting: THORACIC SURGERY (CARDIOTHORACIC VASCULAR SURGERY)

## 2019-12-16 DIAGNOSIS — I35.0 SEVERE AORTIC STENOSIS: Primary | ICD-10-CM

## 2019-12-16 DIAGNOSIS — I35.9 AORTIC VALVE DISORDER: ICD-10-CM

## 2019-12-16 DIAGNOSIS — I50.32 CHRONIC DIASTOLIC CONGESTIVE HEART FAILURE (HCC): ICD-10-CM

## 2019-12-16 DIAGNOSIS — I50.32 CHRONIC DIASTOLIC CONGESTIVE HEART FAILURE (HCC): Primary | ICD-10-CM

## 2019-12-16 LAB
ABO GROUP BLD: NORMAL
ACT BLD: 109 SECONDS (ref 82–152)
ACT BLD: 109 SECONDS (ref 82–152)
ACT BLD: 577 SECONDS (ref 82–152)
ANION GAP SERPL CALCULATED.3IONS-SCNC: 11 MMOL/L (ref 5–15)
APTT PPP: 30.9 SECONDS (ref 24–37)
BASE EXCESS BLDA CALC-SCNC: -2 MMOL/L (ref -5–5)
BASE EXCESS BLDA CALC-SCNC: 0 MMOL/L (ref -5–5)
BASE EXCESS BLDA CALC-SCNC: 1 MMOL/L (ref -5–5)
BUN BLD-MCNC: 12 MG/DL (ref 8–23)
BUN/CREAT SERPL: 19.7 (ref 7–25)
CA-I BLDA-SCNC: 1.15 MMOL/L (ref 1.2–1.32)
CA-I BLDA-SCNC: 1.17 MMOL/L (ref 1.2–1.32)
CA-I BLDA-SCNC: 1.26 MMOL/L (ref 1.2–1.32)
CALCIUM SPEC-SCNC: 8.5 MG/DL (ref 8.6–10.5)
CHLORIDE SERPL-SCNC: 108 MMOL/L (ref 98–107)
CO2 BLDA-SCNC: 24 MMOL/L (ref 24–29)
CO2 BLDA-SCNC: 26 MMOL/L (ref 24–29)
CO2 BLDA-SCNC: 26 MMOL/L (ref 24–29)
CO2 SERPL-SCNC: 22 MMOL/L (ref 22–29)
CREAT BLD-MCNC: 0.61 MG/DL (ref 0.57–1)
DEPRECATED RDW RBC AUTO: 42.2 FL (ref 37–54)
ERYTHROCYTE [DISTWIDTH] IN BLOOD BY AUTOMATED COUNT: 12.8 % (ref 12.3–15.4)
GFR SERPL CREATININE-BSD FRML MDRD: 97 ML/MIN/1.73
GLUCOSE BLD-MCNC: 138 MG/DL (ref 65–99)
GLUCOSE BLDC GLUCOMTR-MCNC: 128 MG/DL (ref 70–130)
GLUCOSE BLDC GLUCOMTR-MCNC: 199 MG/DL (ref 70–130)
GLUCOSE BLDC GLUCOMTR-MCNC: 98 MG/DL (ref 70–130)
HCO3 BLDA-SCNC: 23 MMOL/L (ref 22–26)
HCO3 BLDA-SCNC: 24.5 MMOL/L (ref 22–26)
HCO3 BLDA-SCNC: 25.1 MMOL/L (ref 22–26)
HCT VFR BLD AUTO: 37.6 % (ref 34–46.6)
HCT VFR BLDA CALC: 28 % (ref 38–51)
HCT VFR BLDA CALC: 30 % (ref 38–51)
HCT VFR BLDA CALC: 34 % (ref 38–51)
HGB BLD-MCNC: 12.1 G/DL (ref 12–15.9)
HGB BLDA-MCNC: 10.2 G/DL (ref 12–17)
HGB BLDA-MCNC: 11.6 G/DL (ref 12–17)
HGB BLDA-MCNC: 9.5 G/DL (ref 12–17)
INR PPP: 1.08 (ref 0.85–1.16)
MAGNESIUM SERPL-MCNC: 1.9 MG/DL (ref 1.6–2.4)
MCH RBC QN AUTO: 29.4 PG (ref 26.6–33)
MCHC RBC AUTO-ENTMCNC: 32.2 G/DL (ref 31.5–35.7)
MCV RBC AUTO: 91.5 FL (ref 79–97)
NT-PROBNP SERPL-MCNC: 582.6 PG/ML (ref 5–900)
PCO2 BLDA: 36.8 MM HG (ref 35–45)
PCO2 BLDA: 38 MM HG (ref 35–45)
PCO2 BLDA: 38.2 MM HG (ref 35–45)
PH BLDA: 7.4 PH UNITS (ref 7.35–7.6)
PH BLDA: 7.42 PH UNITS (ref 7.35–7.6)
PH BLDA: 7.43 PH UNITS (ref 7.35–7.6)
PLATELET # BLD AUTO: 141 10*3/MM3 (ref 140–450)
PMV BLD AUTO: 11.6 FL (ref 6–12)
PO2 BLDA: 393 MMHG (ref 80–105)
PO2 BLDA: 486 MMHG (ref 80–105)
PO2 BLDA: 82 MMHG (ref 80–105)
POTASSIUM BLD-SCNC: 3.4 MMOL/L (ref 3.5–5.2)
POTASSIUM BLDA-SCNC: 3.2 MMOL/L (ref 3.5–4.9)
POTASSIUM BLDA-SCNC: 3.3 MMOL/L (ref 3.5–4.9)
POTASSIUM BLDA-SCNC: 3.8 MMOL/L (ref 3.5–4.9)
PROTHROMBIN TIME: 13.5 SECONDS (ref 11.2–14.3)
RBC # BLD AUTO: 4.11 10*6/MM3 (ref 3.77–5.28)
RH BLD: NEGATIVE
SAO2 % BLDA: 100 % (ref 95–98)
SAO2 % BLDA: 100 % (ref 95–98)
SAO2 % BLDA: 96 % (ref 95–98)
SODIUM BLD-SCNC: 141 MMOL/L (ref 136–145)
SODIUM BLDA-SCNC: 141 MMOL/L (ref 138–146)
SODIUM BLDA-SCNC: 142 MMOL/L (ref 138–146)
SODIUM BLDA-SCNC: 142 MMOL/L (ref 138–146)
WBC NRBC COR # BLD: 7.56 10*3/MM3 (ref 3.4–10.8)

## 2019-12-16 PROCEDURE — C1769 GUIDE WIRE: HCPCS | Performed by: THORACIC SURGERY (CARDIOTHORACIC VASCULAR SURGERY)

## 2019-12-16 PROCEDURE — 93005 ELECTROCARDIOGRAM TRACING: CPT | Performed by: PHYSICIAN ASSISTANT

## 2019-12-16 PROCEDURE — 63710000001 INSULIN LISPRO (HUMAN) PER 5 UNITS: Performed by: THORACIC SURGERY (CARDIOTHORACIC VASCULAR SURGERY)

## 2019-12-16 PROCEDURE — 71045 X-RAY EXAM CHEST 1 VIEW: CPT

## 2019-12-16 PROCEDURE — 99232 SBSQ HOSP IP/OBS MODERATE 35: CPT | Performed by: INTERNAL MEDICINE

## 2019-12-16 PROCEDURE — C1894 INTRO/SHEATH, NON-LASER: HCPCS | Performed by: THORACIC SURGERY (CARDIOTHORACIC VASCULAR SURGERY)

## 2019-12-16 PROCEDURE — 84132 ASSAY OF SERUM POTASSIUM: CPT

## 2019-12-16 PROCEDURE — 83735 ASSAY OF MAGNESIUM: CPT | Performed by: PHYSICIAN ASSISTANT

## 2019-12-16 PROCEDURE — 25010000002 HEPARIN (PORCINE) PER 1000 UNITS: Performed by: THORACIC SURGERY (CARDIOTHORACIC VASCULAR SURGERY)

## 2019-12-16 PROCEDURE — 82803 BLOOD GASES ANY COMBINATION: CPT

## 2019-12-16 PROCEDURE — 25010000002 MORPHINE PER 10 MG: Performed by: THORACIC SURGERY (CARDIOTHORACIC VASCULAR SURGERY)

## 2019-12-16 PROCEDURE — 25010000002 PHENYLEPHRINE PER 1 ML: Performed by: NURSE ANESTHETIST, CERTIFIED REGISTERED

## 2019-12-16 PROCEDURE — 93005 ELECTROCARDIOGRAM TRACING: CPT | Performed by: INTERNAL MEDICINE

## 2019-12-16 PROCEDURE — 86900 BLOOD TYPING SEROLOGIC ABO: CPT

## 2019-12-16 PROCEDURE — 82947 ASSAY GLUCOSE BLOOD QUANT: CPT

## 2019-12-16 PROCEDURE — 85730 THROMBOPLASTIN TIME PARTIAL: CPT | Performed by: PHYSICIAN ASSISTANT

## 2019-12-16 PROCEDURE — 25010000002 HEPARIN (PORCINE) PER 1000 UNITS: Performed by: NURSE ANESTHETIST, CERTIFIED REGISTERED

## 2019-12-16 PROCEDURE — 25010000002 ONDANSETRON PER 1 MG: Performed by: NURSE ANESTHETIST, CERTIFIED REGISTERED

## 2019-12-16 PROCEDURE — 85014 HEMATOCRIT: CPT

## 2019-12-16 PROCEDURE — 027F3ZZ DILATION OF AORTIC VALVE, PERCUTANEOUS APPROACH: ICD-10-PCS | Performed by: THORACIC SURGERY (CARDIOTHORACIC VASCULAR SURGERY)

## 2019-12-16 PROCEDURE — 84295 ASSAY OF SERUM SODIUM: CPT

## 2019-12-16 PROCEDURE — 25010000002 IOPAMIDOL 61 % SOLUTION: Performed by: THORACIC SURGERY (CARDIOTHORACIC VASCULAR SURGERY)

## 2019-12-16 PROCEDURE — 93010 ELECTROCARDIOGRAM REPORT: CPT | Performed by: INTERNAL MEDICINE

## 2019-12-16 PROCEDURE — 80048 BASIC METABOLIC PNL TOTAL CA: CPT | Performed by: PHYSICIAN ASSISTANT

## 2019-12-16 PROCEDURE — C1751 CATH, INF, PER/CENT/MIDLINE: HCPCS | Performed by: THORACIC SURGERY (CARDIOTHORACIC VASCULAR SURGERY)

## 2019-12-16 PROCEDURE — 82330 ASSAY OF CALCIUM: CPT

## 2019-12-16 PROCEDURE — 85610 PROTHROMBIN TIME: CPT | Performed by: PHYSICIAN ASSISTANT

## 2019-12-16 PROCEDURE — 93355 ECHO TRANSESOPHAGEAL (TEE): CPT

## 2019-12-16 PROCEDURE — C1760 CLOSURE DEV, VASC: HCPCS | Performed by: THORACIC SURGERY (CARDIOTHORACIC VASCULAR SURGERY)

## 2019-12-16 PROCEDURE — 25010000002 NEOSTIGMINE 10 MG/10ML SOLUTION: Performed by: NURSE ANESTHETIST, CERTIFIED REGISTERED

## 2019-12-16 PROCEDURE — 25010000002 DEXAMETHASONE PER 1 MG: Performed by: NURSE ANESTHETIST, CERTIFIED REGISTERED

## 2019-12-16 PROCEDURE — 85347 COAGULATION TIME ACTIVATED: CPT

## 2019-12-16 PROCEDURE — 85027 COMPLETE CBC AUTOMATED: CPT | Performed by: PHYSICIAN ASSISTANT

## 2019-12-16 PROCEDURE — 33361 REPLACE AORTIC VALVE PERQ: CPT | Performed by: THORACIC SURGERY (CARDIOTHORACIC VASCULAR SURGERY)

## 2019-12-16 PROCEDURE — 25010000002 PROTAMINE SULFATE PER 10 MG: Performed by: NURSE ANESTHETIST, CERTIFIED REGISTERED

## 2019-12-16 PROCEDURE — 25010000002 CEFUROXIME: Performed by: PHYSICIAN ASSISTANT

## 2019-12-16 PROCEDURE — 86901 BLOOD TYPING SEROLOGIC RH(D): CPT

## 2019-12-16 PROCEDURE — 25010000002 ONDANSETRON PER 1 MG: Performed by: THORACIC SURGERY (CARDIOTHORACIC VASCULAR SURGERY)

## 2019-12-16 PROCEDURE — 25010000002 FENTANYL CITRATE (PF) 100 MCG/2ML SOLUTION: Performed by: NURSE ANESTHETIST, CERTIFIED REGISTERED

## 2019-12-16 PROCEDURE — 02RF38Z REPLACEMENT OF AORTIC VALVE WITH ZOOPLASTIC TISSUE, PERCUTANEOUS APPROACH: ICD-10-PCS | Performed by: THORACIC SURGERY (CARDIOTHORACIC VASCULAR SURGERY)

## 2019-12-16 DEVICE — VLV HEART TRNSCATH SAPIEN3 23MM: Type: IMPLANTABLE DEVICE | Status: FUNCTIONAL

## 2019-12-16 RX ORDER — LIDOCAINE HYDROCHLORIDE 10 MG/ML
INJECTION, SOLUTION EPIDURAL; INFILTRATION; INTRACAUDAL; PERINEURAL AS NEEDED
Status: DISCONTINUED | OUTPATIENT
Start: 2019-12-16 | End: 2019-12-16 | Stop reason: SURG

## 2019-12-16 RX ORDER — SODIUM CHLORIDE 0.9 % (FLUSH) 0.9 %
10 SYRINGE (ML) INJECTION EVERY 12 HOURS SCHEDULED
Status: CANCELLED | OUTPATIENT
Start: 2019-12-16

## 2019-12-16 RX ORDER — NALOXONE HCL 0.4 MG/ML
0.4 VIAL (ML) INJECTION AS NEEDED
Status: DISCONTINUED | OUTPATIENT
Start: 2019-12-16 | End: 2019-12-17 | Stop reason: HOSPADM

## 2019-12-16 RX ORDER — CLOPIDOGREL BISULFATE 75 MG/1
75 TABLET ORAL DAILY
Status: DISCONTINUED | OUTPATIENT
Start: 2019-12-17 | End: 2019-12-17 | Stop reason: HOSPADM

## 2019-12-16 RX ORDER — HYDROCODONE BITARTRATE AND ACETAMINOPHEN 7.5; 325 MG/1; MG/1
1 TABLET ORAL EVERY 4 HOURS PRN
Status: DISCONTINUED | OUTPATIENT
Start: 2019-12-16 | End: 2019-12-17 | Stop reason: HOSPADM

## 2019-12-16 RX ORDER — ASPIRIN 81 MG/1
81 TABLET ORAL DAILY
COMMUNITY
Start: 2019-10-16 | End: 2020-05-06

## 2019-12-16 RX ORDER — MEPERIDINE HYDROCHLORIDE 25 MG/ML
12.5 INJECTION INTRAMUSCULAR; INTRAVENOUS; SUBCUTANEOUS
Status: DISCONTINUED | OUTPATIENT
Start: 2019-12-16 | End: 2019-12-16

## 2019-12-16 RX ORDER — PROTAMINE SULFATE 10 MG/ML
INJECTION, SOLUTION INTRAVENOUS AS NEEDED
Status: DISCONTINUED | OUTPATIENT
Start: 2019-12-16 | End: 2019-12-16 | Stop reason: SURG

## 2019-12-16 RX ORDER — ACETAMINOPHEN 325 MG/1
650 TABLET ORAL EVERY 4 HOURS PRN
Status: DISCONTINUED | OUTPATIENT
Start: 2019-12-16 | End: 2019-12-16 | Stop reason: HOSPADM

## 2019-12-16 RX ORDER — ASPIRIN 325 MG
325 TABLET ORAL NIGHTLY
Status: DISCONTINUED | OUTPATIENT
Start: 2019-12-16 | End: 2019-12-16

## 2019-12-16 RX ORDER — CHLORHEXIDINE GLUCONATE 500 MG/1
1 CLOTH TOPICAL EVERY 12 HOURS PRN
Status: DISCONTINUED | OUTPATIENT
Start: 2019-12-16 | End: 2019-12-16 | Stop reason: HOSPADM

## 2019-12-16 RX ORDER — SODIUM CHLORIDE 0.9 % (FLUSH) 0.9 %
10 SYRINGE (ML) INJECTION AS NEEDED
Status: CANCELLED | OUTPATIENT
Start: 2019-12-16

## 2019-12-16 RX ORDER — SODIUM CHLORIDE 0.9 % (FLUSH) 0.9 %
3-10 SYRINGE (ML) INJECTION AS NEEDED
Status: DISCONTINUED | OUTPATIENT
Start: 2019-12-16 | End: 2019-12-17 | Stop reason: HOSPADM

## 2019-12-16 RX ORDER — MORPHINE SULFATE 2 MG/ML
2 INJECTION, SOLUTION INTRAMUSCULAR; INTRAVENOUS EVERY 4 HOURS PRN
Status: DISCONTINUED | OUTPATIENT
Start: 2019-12-16 | End: 2019-12-17 | Stop reason: HOSPADM

## 2019-12-16 RX ORDER — SODIUM CHLORIDE 0.9 % (FLUSH) 0.9 %
3 SYRINGE (ML) INJECTION EVERY 12 HOURS SCHEDULED
Status: DISCONTINUED | OUTPATIENT
Start: 2019-12-16 | End: 2019-12-17 | Stop reason: HOSPADM

## 2019-12-16 RX ORDER — ALBUTEROL SULFATE 2.5 MG/3ML
2.5 SOLUTION RESPIRATORY (INHALATION) EVERY 4 HOURS PRN
Status: DISCONTINUED | OUTPATIENT
Start: 2019-12-16 | End: 2019-12-17 | Stop reason: HOSPADM

## 2019-12-16 RX ORDER — ONDANSETRON 2 MG/ML
4 INJECTION INTRAMUSCULAR; INTRAVENOUS ONCE AS NEEDED
Status: DISCONTINUED | OUTPATIENT
Start: 2019-12-16 | End: 2019-12-16

## 2019-12-16 RX ORDER — HYDROMORPHONE HYDROCHLORIDE 1 MG/ML
0.5 INJECTION, SOLUTION INTRAMUSCULAR; INTRAVENOUS; SUBCUTANEOUS
Status: DISCONTINUED | OUTPATIENT
Start: 2019-12-16 | End: 2019-12-16

## 2019-12-16 RX ORDER — HYDRALAZINE HYDROCHLORIDE 20 MG/ML
5 INJECTION INTRAMUSCULAR; INTRAVENOUS
Status: DISCONTINUED | OUTPATIENT
Start: 2019-12-16 | End: 2019-12-16

## 2019-12-16 RX ORDER — LABETALOL HYDROCHLORIDE 5 MG/ML
5 INJECTION, SOLUTION INTRAVENOUS
Status: DISCONTINUED | OUTPATIENT
Start: 2019-12-16 | End: 2019-12-16

## 2019-12-16 RX ORDER — NITROGLYCERIN 0.4 MG/1
0.4 TABLET SUBLINGUAL
Status: DISCONTINUED | OUTPATIENT
Start: 2019-12-16 | End: 2019-12-16 | Stop reason: HOSPADM

## 2019-12-16 RX ORDER — FAMOTIDINE 10 MG/ML
20 INJECTION, SOLUTION INTRAVENOUS ONCE
Status: CANCELLED | OUTPATIENT
Start: 2019-12-16 | End: 2019-12-16

## 2019-12-16 RX ORDER — SODIUM CHLORIDE, SODIUM LACTATE, POTASSIUM CHLORIDE, CALCIUM CHLORIDE 600; 310; 30; 20 MG/100ML; MG/100ML; MG/100ML; MG/100ML
9 INJECTION, SOLUTION INTRAVENOUS CONTINUOUS
Status: DISCONTINUED | OUTPATIENT
Start: 2019-12-16 | End: 2019-12-17 | Stop reason: HOSPADM

## 2019-12-16 RX ORDER — SODIUM CHLORIDE 9 MG/ML
1 INJECTION, SOLUTION INTRAVENOUS CONTINUOUS
Status: ACTIVE | OUTPATIENT
Start: 2019-12-16 | End: 2019-12-16

## 2019-12-16 RX ORDER — SODIUM CHLORIDE 9 MG/ML
INJECTION, SOLUTION INTRAVENOUS AS NEEDED
Status: DISCONTINUED | OUTPATIENT
Start: 2019-12-16 | End: 2019-12-16 | Stop reason: HOSPADM

## 2019-12-16 RX ORDER — FENTANYL CITRATE 50 UG/ML
50 INJECTION, SOLUTION INTRAMUSCULAR; INTRAVENOUS
Status: DISCONTINUED | OUTPATIENT
Start: 2019-12-16 | End: 2019-12-16

## 2019-12-16 RX ORDER — ONDANSETRON 2 MG/ML
4 INJECTION INTRAMUSCULAR; INTRAVENOUS ONCE AS NEEDED
Status: COMPLETED | OUTPATIENT
Start: 2019-12-16 | End: 2019-12-16

## 2019-12-16 RX ORDER — DEXAMETHASONE SODIUM PHOSPHATE 4 MG/ML
INJECTION, SOLUTION INTRA-ARTICULAR; INTRALESIONAL; INTRAMUSCULAR; INTRAVENOUS; SOFT TISSUE AS NEEDED
Status: DISCONTINUED | OUTPATIENT
Start: 2019-12-16 | End: 2019-12-16 | Stop reason: SURG

## 2019-12-16 RX ORDER — PROMETHAZINE HYDROCHLORIDE 25 MG/ML
6.25 INJECTION, SOLUTION INTRAMUSCULAR; INTRAVENOUS ONCE AS NEEDED
Status: DISCONTINUED | OUTPATIENT
Start: 2019-12-16 | End: 2019-12-16

## 2019-12-16 RX ORDER — ROCURONIUM BROMIDE 10 MG/ML
INJECTION, SOLUTION INTRAVENOUS AS NEEDED
Status: DISCONTINUED | OUTPATIENT
Start: 2019-12-16 | End: 2019-12-16 | Stop reason: SURG

## 2019-12-16 RX ORDER — CHLORHEXIDINE GLUCONATE 500 MG/1
1 CLOTH TOPICAL EVERY 12 HOURS PRN
Status: DISCONTINUED | OUTPATIENT
Start: 2019-12-16 | End: 2019-12-16

## 2019-12-16 RX ORDER — FAMOTIDINE 20 MG/1
20 TABLET, FILM COATED ORAL ONCE
Status: COMPLETED | OUTPATIENT
Start: 2019-12-16 | End: 2019-12-16

## 2019-12-16 RX ORDER — ONDANSETRON 2 MG/ML
INJECTION INTRAMUSCULAR; INTRAVENOUS AS NEEDED
Status: DISCONTINUED | OUTPATIENT
Start: 2019-12-16 | End: 2019-12-16 | Stop reason: SURG

## 2019-12-16 RX ORDER — PROMETHAZINE HYDROCHLORIDE 25 MG/1
25 SUPPOSITORY RECTAL ONCE AS NEEDED
Status: DISCONTINUED | OUTPATIENT
Start: 2019-12-16 | End: 2019-12-16

## 2019-12-16 RX ORDER — HYDROCODONE BITARTRATE AND ACETAMINOPHEN 5; 325 MG/1; MG/1
1 TABLET ORAL ONCE AS NEEDED
Status: DISCONTINUED | OUTPATIENT
Start: 2019-12-16 | End: 2019-12-16

## 2019-12-16 RX ORDER — FENTANYL CITRATE 50 UG/ML
INJECTION, SOLUTION INTRAMUSCULAR; INTRAVENOUS AS NEEDED
Status: DISCONTINUED | OUTPATIENT
Start: 2019-12-16 | End: 2019-12-16 | Stop reason: SURG

## 2019-12-16 RX ORDER — LIDOCAINE HYDROCHLORIDE 10 MG/ML
0.5 INJECTION, SOLUTION EPIDURAL; INFILTRATION; INTRACAUDAL; PERINEURAL ONCE AS NEEDED
Status: COMPLETED | OUTPATIENT
Start: 2019-12-16 | End: 2019-12-16

## 2019-12-16 RX ORDER — CHLORHEXIDINE GLUCONATE 0.12 MG/ML
15 RINSE ORAL ONCE
Status: COMPLETED | OUTPATIENT
Start: 2019-12-16 | End: 2019-12-16

## 2019-12-16 RX ORDER — PROMETHAZINE HYDROCHLORIDE 25 MG/1
25 TABLET ORAL ONCE AS NEEDED
Status: DISCONTINUED | OUTPATIENT
Start: 2019-12-16 | End: 2019-12-16

## 2019-12-16 RX ORDER — ETOMIDATE 2 MG/ML
INJECTION INTRAVENOUS AS NEEDED
Status: DISCONTINUED | OUTPATIENT
Start: 2019-12-16 | End: 2019-12-16 | Stop reason: SURG

## 2019-12-16 RX ORDER — GLYCOPYRROLATE 0.2 MG/ML
INJECTION INTRAMUSCULAR; INTRAVENOUS AS NEEDED
Status: DISCONTINUED | OUTPATIENT
Start: 2019-12-16 | End: 2019-12-16 | Stop reason: SURG

## 2019-12-16 RX ORDER — ASPIRIN 81 MG/1
81 TABLET ORAL DAILY
Status: DISCONTINUED | OUTPATIENT
Start: 2019-12-16 | End: 2019-12-17 | Stop reason: HOSPADM

## 2019-12-16 RX ORDER — CHLORHEXIDINE GLUCONATE 0.12 MG/ML
15 RINSE ORAL EVERY 12 HOURS SCHEDULED
Status: DISCONTINUED | OUTPATIENT
Start: 2019-12-16 | End: 2019-12-17

## 2019-12-16 RX ORDER — NITROGLYCERIN 20 MG/100ML
10-50 INJECTION INTRAVENOUS CONTINUOUS PRN
Status: DISCONTINUED | OUTPATIENT
Start: 2019-12-16 | End: 2019-12-17 | Stop reason: HOSPADM

## 2019-12-16 RX ORDER — HEPARIN SODIUM 1000 [USP'U]/ML
INJECTION, SOLUTION INTRAVENOUS; SUBCUTANEOUS AS NEEDED
Status: DISCONTINUED | OUTPATIENT
Start: 2019-12-16 | End: 2019-12-16 | Stop reason: SURG

## 2019-12-16 RX ORDER — NEOSTIGMINE METHYLSULFATE 1 MG/ML
INJECTION, SOLUTION INTRAVENOUS AS NEEDED
Status: DISCONTINUED | OUTPATIENT
Start: 2019-12-16 | End: 2019-12-16 | Stop reason: SURG

## 2019-12-16 RX ORDER — IPRATROPIUM BROMIDE AND ALBUTEROL SULFATE 2.5; .5 MG/3ML; MG/3ML
3 SOLUTION RESPIRATORY (INHALATION) ONCE AS NEEDED
Status: DISCONTINUED | OUTPATIENT
Start: 2019-12-16 | End: 2019-12-17 | Stop reason: HOSPADM

## 2019-12-16 RX ORDER — PHENYLEPHRINE HCL IN 0.9% NACL 0.5 MG/5ML
.5-3 SYRINGE (ML) INTRAVENOUS
Status: DISCONTINUED | OUTPATIENT
Start: 2019-12-16 | End: 2019-12-16 | Stop reason: HOSPADM

## 2019-12-16 RX ADMIN — ROCURONIUM BROMIDE 50 MG: 10 INJECTION INTRAVENOUS at 12:41

## 2019-12-16 RX ADMIN — PHENYLEPHRINE HYDROCHLORIDE 50 MCG: 10 INJECTION INTRAVENOUS at 13:33

## 2019-12-16 RX ADMIN — INSULIN LISPRO 2 UNITS: 100 INJECTION, SOLUTION INTRAVENOUS; SUBCUTANEOUS at 20:58

## 2019-12-16 RX ADMIN — GLYCOPYRROLATE 0.8 MG: 0.2 INJECTION, SOLUTION INTRAMUSCULAR; INTRAVENOUS at 13:39

## 2019-12-16 RX ADMIN — DEXAMETHASONE SODIUM PHOSPHATE 8 MG: 4 INJECTION, SOLUTION INTRAMUSCULAR; INTRAVENOUS at 12:49

## 2019-12-16 RX ADMIN — SODIUM CHLORIDE 10 MG/HR: 9 INJECTION, SOLUTION INTRAVENOUS at 15:20

## 2019-12-16 RX ADMIN — CHLORHEXIDINE GLUCONATE 0.12% ORAL RINSE 15 ML: 1.2 LIQUID ORAL at 10:45

## 2019-12-16 RX ADMIN — SODIUM CHLORIDE, PRESERVATIVE FREE 3 ML: 5 INJECTION INTRAVENOUS at 20:58

## 2019-12-16 RX ADMIN — EPHEDRINE SULFATE 5 MG: 50 INJECTION INTRAMUSCULAR; INTRAVENOUS; SUBCUTANEOUS at 12:59

## 2019-12-16 RX ADMIN — LIDOCAINE HYDROCHLORIDE 50 MG: 10 INJECTION, SOLUTION EPIDURAL; INFILTRATION; INTRACAUDAL; PERINEURAL at 12:41

## 2019-12-16 RX ADMIN — ONDANSETRON 4 MG: 2 INJECTION INTRAMUSCULAR; INTRAVENOUS at 17:54

## 2019-12-16 RX ADMIN — MORPHINE SULFATE 2 MG: 2 INJECTION, SOLUTION INTRAMUSCULAR; INTRAVENOUS at 17:13

## 2019-12-16 RX ADMIN — HEPARIN SODIUM 15000 UNITS: 1000 INJECTION, SOLUTION INTRAVENOUS; SUBCUTANEOUS at 13:11

## 2019-12-16 RX ADMIN — PHENYLEPHRINE HYDROCHLORIDE 50 MCG: 10 INJECTION INTRAVENOUS at 13:17

## 2019-12-16 RX ADMIN — SODIUM CHLORIDE, POTASSIUM CHLORIDE, SODIUM LACTATE AND CALCIUM CHLORIDE: 600; 310; 30; 20 INJECTION, SOLUTION INTRAVENOUS at 12:35

## 2019-12-16 RX ADMIN — MUPIROCIN 1 APPLICATION: 20 OINTMENT TOPICAL at 10:45

## 2019-12-16 RX ADMIN — ETOMIDATE 16 MG: 2 INJECTION, SOLUTION INTRAVENOUS at 12:41

## 2019-12-16 RX ADMIN — FENTANYL CITRATE 50 MCG: 50 INJECTION, SOLUTION INTRAMUSCULAR; INTRAVENOUS at 12:41

## 2019-12-16 RX ADMIN — LIDOCAINE HYDROCHLORIDE 2 ML: 20 JELLY TOPICAL at 12:42

## 2019-12-16 RX ADMIN — NEOSTIGMINE METHYLSULFATE 5 MG: 1 INJECTION, SOLUTION INTRAVENOUS at 13:39

## 2019-12-16 RX ADMIN — SODIUM CHLORIDE 2.5 MG/HR: 9 INJECTION, SOLUTION INTRAVENOUS at 21:17

## 2019-12-16 RX ADMIN — ONDANSETRON 4 MG: 2 INJECTION INTRAMUSCULAR; INTRAVENOUS at 12:49

## 2019-12-16 RX ADMIN — CHLORHEXIDINE GLUCONATE 0.12% ORAL RINSE 15 ML: 1.2 LIQUID ORAL at 20:58

## 2019-12-16 RX ADMIN — SODIUM CHLORIDE 5 MG/HR: 9 INJECTION, SOLUTION INTRAVENOUS at 13:25

## 2019-12-16 RX ADMIN — PROTAMINE SULFATE 150 MG: 10 INJECTION, SOLUTION INTRAVENOUS at 13:32

## 2019-12-16 RX ADMIN — PHENYLEPHRINE HYDROCHLORIDE 50 MCG: 10 INJECTION INTRAVENOUS at 13:13

## 2019-12-16 RX ADMIN — CEFUROXIME 1.5 G: 1.5 INJECTION, POWDER, FOR SOLUTION INTRAVENOUS at 12:35

## 2019-12-16 RX ADMIN — FAMOTIDINE 20 MG: 20 TABLET ORAL at 10:45

## 2019-12-16 RX ADMIN — LIDOCAINE HYDROCHLORIDE 0.5 ML: 10 INJECTION, SOLUTION EPIDURAL; INFILTRATION; INTRACAUDAL; PERINEURAL at 10:45

## 2019-12-16 RX ADMIN — PHENYLEPHRINE HYDROCHLORIDE 100 MCG: 10 INJECTION INTRAVENOUS at 13:02

## 2019-12-16 RX ADMIN — SODIUM CHLORIDE 1 ML/KG/HR: 9 INJECTION, SOLUTION INTRAVENOUS at 14:29

## 2019-12-16 RX ADMIN — NICARDIPINE HYDROCHLORIDE 10 MG/HR: 0.1 INJECTION, SOLUTION INTRAVENOUS at 14:30

## 2019-12-16 NOTE — ANESTHESIA PROCEDURE NOTES
Arterial Line      Patient reassessed immediately prior to procedure    Patient location during procedure: pre-op  Start time: 12/16/2019 11:35 AM   Line placed for hemodynamic monitoring.  Performed By   Anesthesiologist: Sukhjinder Cardoza MD  Preanesthetic Checklist  Completed: patient identified, site marked, surgical consent, pre-op evaluation, timeout performed, IV checked, risks and benefits discussed and monitors and equipment checked  Arterial Line Prep   Sterile Tech: cap, gloves and sterile barriers  Prep: ChloraPrep  Patient monitoring: blood pressure monitoring, continuous pulse oximetry and EKG  Arterial Line Procedure   Laterality:right  Location:  radial artery  Catheter size: 20 G   Guidance: palpation technique  Number of attempts: 1  Successful placement: yes  Post Assessment   Dressing Type: line sutured, occlusive dressing applied, secured with tape and wrist guard applied.   Complications no  Circ/Move/Sens Assessment: normal and unchanged.   Patient Tolerance: patient tolerated the procedure well with no apparent complications

## 2019-12-16 NOTE — PROGRESS NOTES
Pulmonary/Critical Care Follow-up     LOS: 0 days   Patient Care Team:  Kimberly Anglin FNP as PCP - General (Family Medicine)  Camilo Trent MD as Consulting Physician (Interventional Cardiology)    Chief Complaint: Aortic stenosis/medical management postoperatively after TAVR    No chief complaint on file.    Subjective    69 y.o. lifetime non-smoking female with history of obstructive sleep apnea (has not had sleep study), seasonal allergies, heart murmur who was found to have severe aortic stenosis and was referred to Dr. Encinas for evaluation.  She elected TAVR which was done on 12/16/2019 by Bozena Rodney and Ce.    Interval History:   Patient is awake and alert.  She has no current complaints.  No dyspnea.  No nausea or vomiting.  Sheaths are about to be pulled.  She reports her only problem is allergic rhinitis.    History taken from: Patient.    PMH/FH/Social History were reviewed and updated appropriately in the electronic medical record.   Past Medical History:   Diagnosis Date   • Aortic stenosis    • Arthritis    • Murmur    • Osteitis of symphysis pubis (CMS/HCC)    • Seasonal allergies    • Skin cancer of face     basal cell   • Sleep apnea     has not had sleep study yet, diagnosed during SAW     Social History     Tobacco Use   • Smoking status: Never Smoker   • Smokeless tobacco: Never Used   Substance Use Topics   • Alcohol use: No     Frequency: Never   • Drug use: No         Review of Systems:    Review of 14 systems was completed with positives and pertinent negatives noted in the subjective section.  All other systems reviewed and are negative.         Objective     Vital Signs  Temp:  [97.5 °F (36.4 °C)-98.2 °F (36.8 °C)] 97.5 °F (36.4 °C)  Heart Rate:  [] 80  Resp:  [16-20] 20  BP: ()/(56-87) 109/63  Arterial Line BP: ()/(34-50) 117/50  No intake/output data recorded.  Body mass index is 21.47 kg/m².     IV drips:    lactated ringers    niCARdipine Last  Rate: 2.5 mg/hr (12/16/19 1600)   nitroglycerin    sodium chloride Last Rate: 1 mL/kg/hr (12/16/19 1429)      Physical Exam:     Constitutional:   Alert, cooperative, in no acute distress   Head:   Normocephalic, without obvious abnormality, atraumatic   Eyes:           Lids and lashes normal, conjunctivae and sclerae normal.  No icterus, no pallor, corneas clear, PER   ENMT:  Ears appear intact with no abnormalities noted     No oral lesions, no thrush, oral mucosa moist   Neck:  No adenopathy, supple, trachea midline, no thyromegaly, no JVD   Lungs/Resp:    Normal effort, symmetric chest rise, no crepitus, clear to      auscultation bilaterally, no chest wall tenderness                Heart/CV:   Regular rhythm and normal rate, normal S1 and S2, grade 2 out of 6 systolic murmur   Abdomen/GI:    Normal bowel sounds, no masses, no organomegaly, soft        non-tender, non-distended   :     No hematoma at groin site   Extremities/MSK:  No clubbing or cyanosis.  No edema.  Normal tone.    No deformities.    Pulses:  Pulses palpable and equal bilaterally   Skin:  No bleeding, bruising or rash   Heme/Lymph:  No cervical or supraclavicular adenopathy.   Neurologic:    Psychiatric:    Moves all extremities with no obvious focal motor deficit.  Cranial nerves 2 - 12 grossly intact  Oriented x 3, normal affect.      Results Review:     I reviewed the patient's new clinical results.   Results from last 7 days   Lab Units 12/16/19  1506 12/15/19  1127   SODIUM mmol/L 141 143   POTASSIUM mmol/L 3.4* 3.5   CHLORIDE mmol/L 108* 107   CO2 mmol/L 22.0 26.0   BUN mg/dL 12 11   CREATININE mg/dL 0.61 0.75   CALCIUM mg/dL 8.5* 9.6   BILIRUBIN mg/dL  --  0.4   ALK PHOS U/L  --  60   ALT (SGPT) U/L  --  13   AST (SGOT) U/L  --  14   GLUCOSE mg/dL 138* 79     Results from last 7 days   Lab Units 12/16/19  1506 12/16/19  1341 12/16/19  1318  12/15/19  1127   WBC 10*3/mm3 7.56  --   --   --  4.20   HEMOGLOBIN g/dL 12.1  --   --   --   12.7   HEMOGLOBIN, POC g/dL  --  10.2* 9.5*   < >  --    HEMATOCRIT % 37.6  --   --   --  39.4   HEMATOCRIT POC %  --  30* 28*   < >  --    PLATELETS 10*3/mm3 141  --   --   --  195    < > = values in this interval not displayed.     Results from last 7 days   Lab Units 12/16/19  1341   PH, ARTERIAL pH units 7.42     Results from last 7 days   Lab Units 12/16/19  1506 12/15/19  1127   MAGNESIUM mg/dL 1.9 2.1     Results from last 7 days   Lab Units 12/15/19  1127   HEMOGLOBIN A1C % 5.40       I reviewed the patient's new imaging including images and reports.  Chest x-ray 12/16/2019 was reviewed.  It shows prosthetic aortic valve in expected position with left base airspace opacities new from prior study.    Medication Review:     aspirin 81 mg Oral Daily   chlorhexidine 15 mL Mouth/Throat Q12H   [START ON 12/17/2019] clopidogrel 75 mg Oral Daily   insulin lispro 0-9 Units Subcutaneous 4x Daily With Meals & Nightly   mupirocin 1 application Each Nare Q12H   sodium chloride 3 mL Intravenous Q12H       lactated ringers 9 mL/hr    niCARdipine 5-15 mg/hr Last Rate: 2.5 mg/hr (12/16/19 1600)   nitroglycerin 10-50 mcg/min    sodium chloride 1 mL/kg/hr Last Rate: 1 mL/kg/hr (12/16/19 1429)       Assessment/Plan       Severe aortic stenosis    Aortic valve disorder    69 y.o. female admitted to the ICU post TAVR after being found to have a heart murmur on evaluation by her primary care provider.  Evaluation showed severe aortic stenosis and she was referred for TAVR which was done on 12/16/2019 by Bozena Howell and Ronel.    Plan:  1.  Monitor in the intensive care unit post TAVR.  2.  Monitor for bleeding post sheath removal.  3.  Correction insulin if needed.      Charlie See MD  12/16/19  4:38 PM      *. Please note that portions of this note were completed with a voice recognition program. Efforts were made to edit the dictations, but occasionally words are mistranscribed.

## 2019-12-16 NOTE — NURSING NOTE
TAVR Multidisciplinary Team Meeting         Patient Name: Albania Hernandez     YOB: 1950     Referring Physician:  Camilo Trent MD  Admission Status: Outpatient    Attendees: Major Rodríguez MD, Pierce Encinas MD, Veronica Rodney MD, Augustin Baker MD, Jose Clinical Specialist, Humberto Garcia, Sukhjinder Cardoza MD and Penny JOHN    Primary presentation of AS:  Murmur Eval   Heart Failure:  Chronic and Diastolic    NYHA Functional Class: II    LVEF:  60%    Major Organ Compromise:   NA    Procedure Specific Impediment:   N/A    Other Factors:  Major Nutritional Deficit: No  Cognitive Impairment: No  Oxygen dependent: No    STS Risk Score:  Mortality Risk: 0.67%  Mortality and Morbidity Risk: 5.2%    TAVR Rationale: Low risk patient whose vasculature and coronary anatomy is suitable to TAVR.           Diagnostic Studies Discussed/ Reviewed: CTA chest, abdomen, and pelvis, cardiac cath, SAW    Procedure planning details:  Bicuspid aortic valve with left coronary sinus calcification  Valve Size: 23 mm  Cardiology sheath access: left femoral  CT Surgery sheath access: right femoral    Post Procedure Considerations: usual surveillance for post procedure bleeding or bradyarrhythmias      Penny JOHN

## 2019-12-16 NOTE — OP NOTE
DATE OF PROCEDURE: 12/16/2019      PREOPERATIVE DIAGNOSES:  1. Severe aortic stenosis      POSTOPERATIVE DIAGNOSES:    1. Severe aortic stenosis    PROCEDURES PERFORMED:    1. Percutaneous right common femoral arterial sheath placement  2. Multiple aortograms  3. Balloon aortic valvuloplasty  4. Transcatheter aortic valve replacement (23 mm Antoni 3 tissue valve)  5. Completion aortogram      SURGEON: Pierce Encinas MD        Assistant: Major Rodríguez MD    CARDIOLOGISTS:  1. Dr. Augustin Baker MD  2. Veronica Rodney MD      ANESTHESIA: General endotracheal anesthesia with Dr Sukhjinder Cardoza MD      ESTIMATED BLOOD LOSS: Less than 100 mL.        FLUOROSCOPY TIME: 8:00 with an exposure of 464 mGy      CONTRAST: 80 mL       INDICATIONS:  69-year-old  female, otherwise healthy who presented with fatigue and shortness of breath.  She was found to have severe aortic stenosis and was felt to be a reasonable candidate for TAVR. The risks and benefits of surgery were discussed with the patient and his family including pain, bleeding, infection, renal failure, stroke, heart block and death. The patient understood these risks and wished to proceed with surgery.       DESCRIPTION OF PROCEDURE: The patient was taken to the operating room and placed under general endotracheal anesthesia. She was prepped and draped in the usual sterile fashion. A timeout was performed including the patient’s name, procedure, and antibiotic administration. Left common femoral arterial and venous lines were placed by the cardiologists for placement of a pigtail catheter and temporary venous pacer. Needle access of the right common femoral artery over the femoral head was obtained and the incision was enlarged using a #11 blade. Systemic heparin was administered. A 7-Gibraltarian dilator was then placed over the wire using modified Seldinger technique and 2 Perclose devices were deployed. A 7-Gibraltarian sheath was then placed followed by a  14-Wallisian sheath within the abdominal aorta. This was secured using a silk suture. An AL-1 catheter was utilized to cross the aortic valve with placement of a Safari wire. The balloon was then placed over the wire into the aortic valve and rapid pacing was performed with satisfactory balloon valvuloplasty. The balloon was then exchanged for the 23 mm JOSE 3 valve, which was placed in the correct position at the aortic annulus. The valve was deployed after rapid pacing and was found to be in satisfactory position with trivial paravalvular leak on echocardiogram. Aortogram was performed that revealed trivial aortic regurgitation. The wire and valve device were removed and a Magic Torque wire was then placed within the descending thoracic aorta along with the dilator and the sheath was pulled back into the common femoral artery. An aortogram was then performed that revealed intact vasculature. The groin sheath was then removed and 2 Perclose devices were deployed with satisfactory hemostasis. The groin incision was then sealed with skin glue and the left groin pigtail and pacing wire were removed. The sheaths were left in place and the patient was extubated in the operating room and transported to the cardiac ICU in stable condition.

## 2019-12-16 NOTE — INTERVAL H&P NOTE
"Pre-Op H&P (See Recent Office Note Attached for Full H&P)    Chief complaint: mild exertional dyspnea, fatigue    HPI:      Patient is a 69 y.o. female who presents today for a TAVR. She was seen by her PCP for her yearly physical and was found to have a murmur, which the patient notes having for many years. Her primary care physician referred her to cardiology for a work up as she had never had a prior evaluation of her murmur. During her work up, she was found to have severe aortic stenosis and after having a discussion with Dr. Encinas and his APRN, she has opted for a TAVR.     She continues to have mild dyspnea on exertion and mild fatigue, neither of which has worsened since her office visit. She notes a UTI about one month for which she received antibiotic therapy. Her repeat urinalysis on 12/15/19 was negtive. She denies chest pain, fevers or chills.     Review of Systems:  General ROS:  no fever, chills, rashes, No change since last office visit  Cardiovascular ROS: no chest pain   Respiratory ROS: no cough, shortness of breath, or wheezing    Immunization History:   Influenza:  no  Pneumococcal:  no  Tetanus:  Unsure if up to date    Meds:    No current facility-administered medications on file prior to encounter.      Current Outpatient Medications on File Prior to Encounter   Medication Sig Dispense Refill   • aspirin 81 MG EC tablet Take 81 mg by mouth Daily.     • fexofenadine (ALLEGRA) 180 MG tablet Take 180 mg by mouth Daily.         Vital Signs:  /87 (BP Location: Right arm, Patient Position: Lying)   Pulse 65   Temp 98.2 °F (36.8 °C) (Tympanic)   Resp 18   Ht 167.6 cm (66\")   Wt 60.3 kg (133 lb)   SpO2 96%   BMI 21.47 kg/m²     Physical Exam:    CV:  Cardiac rate and rhythm regular with audible systolic murmur               Resp:  Clear to auscultation; respirations regular, even and unlabored    Results Review:     Lab Results   Component Value Date    WBC 4.20 12/15/2019    HGB 12.7 " 12/15/2019    HCT 39.4 12/15/2019    MCV 92.3 12/15/2019     12/15/2019    NEUTROABS 2.24 12/15/2019    GLUCOSE 79 12/15/2019    BUN 11 12/15/2019    CREATININE 0.75 12/15/2019    EGFRIFNONA 77 12/15/2019     12/15/2019    K 3.5 12/15/2019     12/15/2019    CO2 26.0 12/15/2019    MG 2.1 12/15/2019    CALCIUM 9.6 12/15/2019    ALBUMIN 4.40 12/15/2019    AST 14 12/15/2019    ALT 13 12/15/2019    BILITOT 0.4 12/15/2019     12/5/19 Carotid ultrasound: Interpretation Summary     · No evidence of hemodynamically significant stenosis of bilateral carotid arteries.  · Intimal thickening and mild scattered plaque is noted.          12/6/19 CTA: IMPRESSION: Ascending thoracic aortic aneurysm. The remainder of the abdominal aorta and iliac vessels are normal in caliber and unremarkable. No significant stenosis. There is no acute intrathoracic, intra-abdominal or pelvic abnormality.     12/6/19 SAW: Interpretation Summary      · Estimated EF = 60%.  · Left ventricular systolic function is normal.  · Severe aortic valve stenosis is present.  · Aortic valve mean pressure gradient is 71.2 mmHg.  · Aortic valve area is 0.5 cm2.            I reviewed the patient's new clinical results.         Cancer Staging (if applicable)  Cancer Patient: __ yes __no __unknown; If yes, clinical stage T:__ N:__M:__, stage group or _x_N/A    Assessment:  1. Severe aortic stenosis  2.  Chronic diastolic congestive heart failure     Plan:  1. TAVR   Patient was seen in the office and the risks and benefits were discussed at that time. Please see attached office note.     EDUARD Manley  12/16/2019   10:44 AM

## 2019-12-16 NOTE — OP NOTE
PROCEDURE(S):   1. 6F femoral arterial sheath placement.  2. 8F femoral venous sheath placement.  3. Temporary transvenous pacemaker insertion.  4. Catheter placement in the aortic root.  5. Multiple intraprocedural aortograms.   6. Balloon aortic valvuloplasty.  7. Transcatheter aortic valve replacement with 23 mm Garcia JOSE 3 pericardial  prosthesis.     INDICATIONS:   1. Critical aortic stenosis.  2. Patient is not a suitable candidate for conventional surgery.    INTERVENTIONAL CARDIOLOGISTS:  Veronica Rodney MD.  Augustin Baker MD.    CARDIAC SURGEONS:   MD Major Burt MD.    DESCRIPTION OF PROCEDURE:   After informed consent, the patient was brought to the OR in a fasting condition. The patient was prepped and draped from level of chin to  knees by standard surgical technique. Left femoral arterial access was obtained  by percutaneous anterior wall puncture technique and an 6-Lithuanian arterial  sheath was placed. Venous access was obtained in similar fashion and a 8-Lithuanian  venous sheath was placed.   Temporary transvenous pacing electrode was inserted via the left femoral  venous access and advanced to the right ventricular apex and excellent  pacing/sensing was noted. A pigtail catheter was advanced from the femoral  arterial access and this was advanced to the aortic root, and intraprocedural aortography was performed.     At this time, right femoral arterial access was obtained by  Dr. Rodríguez and Dr. Encinas and an Garcia sheath was placed (see  separate note). Using this access, AL1 catheter, and straight wire, the aortic valve was crossed. The catheter was advanced into the left ventricle and the wire was exchanged for a Safari wire. At this time, balloon aortic valvuloplasty was performed with a 20 mm balloon. Subsequently, a 23 mm tissue Garcia JOSE valve was advanced using standard delivery system. This was positioned under fluoroscopy. After the satisfactory position was  confirmed, the valve was expanded under rapid pacing protocol. Satisfactory position was noted. Post implant images revealed trivial aortic insufficiency which was paravalvular. No significant central regurgitation was noted.     At this time, the delivery system was removed. The arterial sheath was removed  and the arteriotomy was closed by the surgeons (see separate note). The  patient tolerated the procedure well and without complications.    FINAL IMPRESSION:   · Successful transcatheter aortic valve replacement with 23 mm        Garcia JOSE tissue valve.   · No acute procedure-related complications.     Veronica Rodney MD, FACC

## 2019-12-16 NOTE — ANESTHESIA POSTPROCEDURE EVALUATION
Patient: Albania Hernandez    Procedure Summary     Date:  12/16/19 Room / Location:   [facility] /  [facility]    Anesthesia Start:  1235 Anesthesia Stop:  1356    Procedures:       TRANSCATHETER AORTIC VALVE REPLACEMENT WITH SAW (N/A Chest)      Transfemoral Transcatheter Aortic Valve Replacement (N/A ) Diagnosis:       Aortic valve disorder      (Aortic valve disorder [I35.9])    Surgeon:  Pierce Encinas MD; Veronica Rodney MD Provider:  Sukhjinder Cardoza MD    Anesthesia Type:  general ASA Status:  4          Anesthesia Type: general    Vitals  Vitals Value Taken Time   BP     Temp     Pulse 101 12/16/2019  1:57 PM   Resp     SpO2 97 % 12/16/2019  1:57 PM   Vitals shown include unvalidated device data.        Post Anesthesia Care and Evaluation    Patient location during evaluation: ICU  Patient participation: complete - patient participated  Level of consciousness: awake and alert  Pain score: 0  Pain management: adequate  Airway patency: patent  Anesthetic complications: No anesthetic complications  PONV Status: none  Cardiovascular status: hemodynamically stable and acceptable  Respiratory status: nonlabored ventilation, acceptable and nasal cannula  Hydration status: acceptable    Comments:   Temp 97.5  resp 14  bp 115/49  SpO2 98

## 2019-12-16 NOTE — ANESTHESIA PROCEDURE NOTES
Airway  Urgency: elective    Date/Time: 12/16/2019 12:43 PM  Airway not difficult    General Information and Staff    Patient location during procedure: OR  CRNA: Josué Jaramillo III, CRNA    Indications and Patient Condition  Indications for airway management: airway protection    Preoxygenated: yes  MILS not maintained throughout  Mask difficulty assessment: 1 - vent by mask    Final Airway Details  Final airway type: endotracheal airway      Successful airway: ETT  Cuffed: yes   Successful intubation technique: direct laryngoscopy  Endotracheal tube insertion site: oral  Blade: Dima  Blade size: 3  ETT size (mm): 7.0  Cormack-Lehane Classification: grade I - full view of glottis  Placement verified by: chest auscultation and capnometry   Measured from: lips  ETT/EBT  to lips (cm): 20  Number of attempts at approach: 1  Assessment: lips, teeth, and gum same as pre-op and atraumatic intubation    Additional Comments  Negative epigastric sounds, Breath sound equal bilaterally with symmetric chest rise and fall

## 2019-12-16 NOTE — ANESTHESIA PREPROCEDURE EVALUATION
Anesthesia Evaluation     Patient summary reviewed and Nursing notes reviewed   NPO Solid Status: > 8 hours  NPO Liquid Status: > 8 hours           Airway   Mallampati: II  TM distance: >3 FB  No difficulty expected  Dental - normal exam     Pulmonary    (+) rales,   Cardiovascular   Exercise tolerance: good (4-7 METS)    Rate: normal    (+) murmur,       Neuro/Psych  GI/Hepatic/Renal/Endo      Musculoskeletal     Abdominal    Substance History      OB/GYN          Other                        Anesthesia Plan    ASA 4     general     intravenous induction     Anesthetic plan, all risks, benefits, and alternatives have been provided, discussed and informed consent has been obtained with: patient.    A line bernice   Return to icu post op

## 2019-12-16 NOTE — ANESTHESIA PROCEDURE NOTES
Procedure Performed: TAVR SAW      Start Time:        End Time:   12/16/2019 1:12 PM    Preanesthesia Checklist:  Patient identified, IV assessed, risks and benefits discussed, monitors and equipment assessed, procedure being performed at surgeon's request and anesthesia consent obtained.    General Procedure Information  Diagnostic Indications for Echo:  assessment of ascending aorta, defect repair evaluation and hemodynamic monitoring  Physician Requesting Echo: Pierce Encinas MD  Location performed:  OR  Intubated  Heart visualized  Probe Type:  Multiplane  Modalities:  2D only, color flow mapping, continuous wave Doppler and pulse wave Doppler    Echocardiographic and Doppler Measurements    Ventricles    Right Ventricle:  Cavity size normal.  Hypertrophy not present.  Thrombus not present.  Global function normal.    Left Ventricle:  Cavity size normal.  Diastolic dimension 1.39 cm.  Hypertrophy present.  Thrombus not present.  Global Function normal.      Ventricular Regional Function:  1- Basal Anteroseptal:  normal  2- Basal Anterior:  normal  3- Basal Anterolateral:  normal  4- Basal Inferolateral:  normal  5- Basal Inferior:  normal  6- Basal Inferoseptal:  normal  7- Mid Anteroseptal:  normal  8- Mid Anterior:  normal  9- Mid Anterolateral:  normal  10- Mid Inferolateral:  normal  11- Mid Inferior:  normal  12- Mid Inferoseptal:  normal  13- Apical Anterior:  normal  14- Apical Lateral:  normal  15- Apical Inferior:  normal  16- Apical Septal:  normal      Valves    Aortic Valve:  Annulus calcified.  Stenosis severe.  Area: 0.56 cm².  Regurgitation trace.  Leaflets calcified and bicuspid.  Leaflet motions restricted.  Specific leaflet segments with abnormal motions are described in the following comments:       both    Mitral Valve:  Annulus normal.  Stenosis not present.  Regurgitation trace.  Leaflets normal.  Leaflet motions normal.      Tricuspid Valve:  Annulus normal.  Stenosis not present.     Pulmonic Valve:  Annulus normal.  Stenosis not present.          Aorta    Ascending Aorta:  Size normal.  Diameter 3.9 cm.  Dissection not present.  Plaque thickness less than 3 mm.  Mobile plaque not present.    Aortic Arch:  Size normal.  Plaque thickness less than 3 mm.  Mobile plaque not present.    Descending Aorta:  Size normal.  Plaque thickness less than 3 mm.  Mobile plaque not present.          Atria    Right Atrium:  Size normal.  Spontaneous echo contrast not present.  Thrombus not present.  Tumor not present.  Device not present.      Left Atrium:  Size normal.  Spontaneous echo contrast not present.  Tumor not present.  Device not present.    Left atrial appendage normal.      Septa    Atrial Septum:  Intra-atrial septal morphology normal.      Ventricular Septum:  Intra-ventricular septum morphology normal.          Other Findings  Pericardium:  normal  Pleural Effusion:  none  Pulmonary Arteries:  normal  Pulmonary Venous Flow:  normal    Anesthesia Information      Echocardiogram Comments:       Bicuspid aortic valve severe as  Ascending aorta 3.9 cm  Trace mr  Good lvf  lvh present  Post deployment stented bioprostetic valve in aortic position  Small obliique para valvular leaks  Good lvf  No pericardial effusion

## 2019-12-17 VITALS
DIASTOLIC BLOOD PRESSURE: 66 MMHG | OXYGEN SATURATION: 97 % | WEIGHT: 133 LBS | HEIGHT: 66 IN | SYSTOLIC BLOOD PRESSURE: 114 MMHG | TEMPERATURE: 98.3 F | BODY MASS INDEX: 21.38 KG/M2 | HEART RATE: 76 BPM | RESPIRATION RATE: 16 BRPM

## 2019-12-17 LAB
ANION GAP SERPL CALCULATED.3IONS-SCNC: 9 MMOL/L (ref 5–15)
BUN BLD-MCNC: 11 MG/DL (ref 8–23)
BUN/CREAT SERPL: 17.7 (ref 7–25)
CALCIUM SPEC-SCNC: 8.9 MG/DL (ref 8.6–10.5)
CHLORIDE SERPL-SCNC: 108 MMOL/L (ref 98–107)
CO2 SERPL-SCNC: 22 MMOL/L (ref 22–29)
CREAT BLD-MCNC: 0.62 MG/DL (ref 0.57–1)
DEPRECATED RDW RBC AUTO: 41.9 FL (ref 37–54)
ERYTHROCYTE [DISTWIDTH] IN BLOOD BY AUTOMATED COUNT: 12.6 % (ref 12.3–15.4)
GFR SERPL CREATININE-BSD FRML MDRD: 95 ML/MIN/1.73
GLUCOSE BLD-MCNC: 147 MG/DL (ref 65–99)
GLUCOSE BLDC GLUCOMTR-MCNC: 131 MG/DL (ref 70–130)
HCT VFR BLD AUTO: 35.7 % (ref 34–46.6)
HGB BLD-MCNC: 11.3 G/DL (ref 12–15.9)
MAGNESIUM SERPL-MCNC: 1.9 MG/DL (ref 1.6–2.4)
MCH RBC QN AUTO: 28.9 PG (ref 26.6–33)
MCHC RBC AUTO-ENTMCNC: 31.7 G/DL (ref 31.5–35.7)
MCV RBC AUTO: 91.3 FL (ref 79–97)
PLATELET # BLD AUTO: 149 10*3/MM3 (ref 140–450)
PMV BLD AUTO: 11.6 FL (ref 6–12)
POTASSIUM BLD-SCNC: 3.7 MMOL/L (ref 3.5–5.2)
RBC # BLD AUTO: 3.91 10*6/MM3 (ref 3.77–5.28)
SODIUM BLD-SCNC: 139 MMOL/L (ref 136–145)
WBC NRBC COR # BLD: 9.77 10*3/MM3 (ref 3.4–10.8)

## 2019-12-17 PROCEDURE — 99233 SBSQ HOSP IP/OBS HIGH 50: CPT | Performed by: NURSE PRACTITIONER

## 2019-12-17 PROCEDURE — 83735 ASSAY OF MAGNESIUM: CPT | Performed by: THORACIC SURGERY (CARDIOTHORACIC VASCULAR SURGERY)

## 2019-12-17 PROCEDURE — 93005 ELECTROCARDIOGRAM TRACING: CPT | Performed by: PHYSICIAN ASSISTANT

## 2019-12-17 PROCEDURE — 85027 COMPLETE CBC AUTOMATED: CPT | Performed by: PHYSICIAN ASSISTANT

## 2019-12-17 PROCEDURE — 99231 SBSQ HOSP IP/OBS SF/LOW 25: CPT | Performed by: NURSE PRACTITIONER

## 2019-12-17 PROCEDURE — 93010 ELECTROCARDIOGRAM REPORT: CPT | Performed by: INTERNAL MEDICINE

## 2019-12-17 PROCEDURE — 99238 HOSP IP/OBS DSCHRG MGMT 30/<: CPT | Performed by: PHYSICIAN ASSISTANT

## 2019-12-17 PROCEDURE — 82962 GLUCOSE BLOOD TEST: CPT

## 2019-12-17 PROCEDURE — 80048 BASIC METABOLIC PNL TOTAL CA: CPT | Performed by: PHYSICIAN ASSISTANT

## 2019-12-17 RX ORDER — CLOPIDOGREL BISULFATE 75 MG/1
75 TABLET ORAL DAILY
Qty: 30 TABLET | Refills: 3 | Status: SHIPPED | OUTPATIENT
Start: 2019-12-17 | End: 2019-12-18

## 2019-12-17 RX ORDER — CLOPIDOGREL BISULFATE 75 MG/1
75 TABLET ORAL DAILY
Qty: 30 TABLET | Refills: 3 | Status: SHIPPED | OUTPATIENT
Start: 2019-12-17 | End: 2019-12-17

## 2019-12-17 RX ADMIN — SODIUM CHLORIDE, PRESERVATIVE FREE 3 ML: 5 INJECTION INTRAVENOUS at 08:18

## 2019-12-17 RX ADMIN — CLOPIDOGREL BISULFATE 75 MG: 75 TABLET ORAL at 08:17

## 2019-12-17 RX ADMIN — ASPIRIN 81 MG: 81 TABLET, COATED ORAL at 08:17

## 2019-12-17 NOTE — PROGRESS NOTES
Discharge Planning Assessment  [facility]     Patient Name: Albania Hernandez  MRN: [id number]  Today's Date: 12/17/2019    Admit Date: 12/16/2019    Discharge Needs Assessment     Row Name 12/17/19 1118       Living Environment    Lives With  alone    Current Living Arrangements  home/apartment/condo    Primary Care Provided by  self    Provides Primary Care For  no one    Family Caregiver if Needed  child(denis), adult;grandchild(denis), adult    Quality of Family Relationships  involved;helpful;supportive    Able to Return to Prior Arrangements  yes       Transition Planning    Patient/Family Anticipates Transition to  home with family    Transportation Anticipated  family or friend will provide       Discharge Needs Assessment    Readmission Within the Last 30 Days  no previous admission in last 30 days    Concerns to be Addressed  denies needs/concerns at this time    Equipment Currently Used at Home  none    Equipment Needed After Discharge  none    Discharge Coordination/Progress  Pt. lives alone in [place], [place].  Her granddaughter is staying with her while she is home from college. Pt. has 2 supportive sons that also assist her.  Pt's family will provide transportation home at discharge.  Pt did not use DME Or HH services prior to admission.  She does not feel that she will need either at discharge.          Discharge Plan     Row Name 12/17/19 1126       Plan    Plan  home    Patient/Family in Agreement with Plan  yes    Plan Comments  SW met with pt at bedside.  She had 3 family members present with her.  Pt. will return home at discharge and denies having any needs.    Final Discharge Disposition Code  01 - home or self-care        Destination      Coordination has not been started for this encounter.      Durable Medical Equipment      Coordination has not been started for this encounter.      Dialysis/Infusion      Coordination has not been started for this encounter.      Home Medical Care       Coordination has not been started for this encounter.      Therapy      Coordination has not been started for this encounter.      Community Resources      Coordination has not been started for this encounter.        Expected Discharge Date and Time     Expected Discharge Date Expected Discharge Time    Dec 17, 2019         Demographic Summary     Row Name 12/17/19 1108       General Information    Admission Type  inpatient    Arrived From  operating room    Preferred Language  English       Contact Information    Contact Information Comments  PCP is Karen Anglin        Functional Status     Row Name 12/17/19 1115       Functional Status, IADL    Medications  independent    Meal Preparation  independent    Housekeeping  independent    Laundry  independent    Shopping  independent       Employment/    Employment/ Comments  Pt. has insurance and prescription coverage through [employer].        Psychosocial    No documentation.       Abuse/Neglect    No documentation.       Legal    No documentation.       Substance Abuse    No documentation.       Patient Forms    No documentation.           Ria Hansen MSW

## 2019-12-17 NOTE — PROGRESS NOTES
Case Management Discharge Note      Final Note: Pt was discharged home today.  She had family to assist her.  Pt. denies having any discharge needs.         Destination      No service has been selected for the patient.      Durable Medical Equipment      No service has been selected for the patient.      Dialysis/Infusion      No service has been selected for the patient.      Home Medical Care      No service has been selected for the patient.      Therapy      No service has been selected for the patient.      Community Resources      No service has been selected for the patient.             Final Discharge Disposition Code: 01 - home or self-care

## 2019-12-17 NOTE — PLAN OF CARE
Problem: Cardiac Surgery (Adult)  Description  Prevent and manage potential problems includin. bleeding  2. bowel motility decreased  3. cardiac complications  4. functional deficit  5. hemodynamic instability  6. infection  7. neurologic complications  8. pain  9. postoperative nausea and vomiting  10. postoperative urinary retention  11. respiratory compromise  12. situational response  13. VTE (venous thromboembolism)  14. wound healing impaired  Goal: Signs and Symptoms of Listed Potential Problems Will be Absent, Minimized or Managed (Cardiac Surgery)  Description  Signs and symptoms of listed potential problems will be absent, minimized or managed by discharge/transition of care (reference Cardiac Surgery (Adult) CPG).  Flowsheets (Taken 2019 1930)  Problems Assessed (Cardiac Surgery): all  Problems Present (Cardiac Surgery): postoperative nausea and vomiting     Problem: Cardiac Surgery (Adult)  Description  Prevent and manage potential problems includin. bleeding  2. bowel motility decreased  3. cardiac complications  4. functional deficit  5. hemodynamic instability  6. infection  7. neurologic complications  8. pain  9. postoperative nausea and vomiting  10. postoperative urinary retention  11. respiratory compromise  12. situational response  13. VTE (venous thromboembolism)  14. wound healing impaired  Goal: Anesthesia/Sedation Recovery  Outcome: Outcome(s) achieved     Problem: Fall Risk (Adult)  Goal: Identify Related Risk Factors and Signs and Symptoms  Description  Related risk factors and signs and symptoms are identified upon initiation of Human Response Clinical Practice Guideline (CPG).  Outcome: Outcome(s) achieved  Flowsheets (Taken 2019 1932)  Related Risk Factors (Fall Risk): sleep pattern alteration; environment unfamiliar  Signs and Symptoms (Fall Risk): presence of risk factors     Problem: Fall Risk (Adult)  Goal: Absence of Fall  Description  Patient will  demonstrate the desired outcomes by discharge/transition of care.  Outcome: Outcome(s) achieved     Problem: Skin Injury Risk (Adult)  Goal: Identify Related Risk Factors and Signs and Symptoms  Description  Related risk factors and signs and symptoms are identified upon initiation of Human Response Clinical Practice Guideline (CPG).  Outcome: Outcome(s) achieved  Flowsheets (Taken 12/16/2019 1933)  Related Risk Factors (Skin Injury Risk): mobility impaired; critical care admission     Problem: Skin Injury Risk (Adult)  Goal: Skin Health and Integrity  Description  Patient will demonstrate the desired outcomes by discharge/transition of care.  Outcome: Outcome(s) achieved  Flowsheets (Taken 12/16/2019 1933)  Skin Health and Integrity: achieves outcome

## 2019-12-17 NOTE — PROGRESS NOTES
CTS Progress Note      POD 1 s/p TAVR       LOS: 1 day   Patient Care Team:  Kimberly Anglin FNP as PCP - General (Family Medicine)  Camilo Trent MD as Consulting Physician (Interventional Cardiology)    Subjective  Awake, alert, cooperative  Oriented x3, up in chair  Ambulated a little this morning    CC:     Objective    Vital Signs  Temp:  [97.3 °F (36.3 °C)-98.2 °F (36.8 °C)] 97.3 °F (36.3 °C)  Heart Rate:  [] 53  Resp:  [16-20] 18  BP: ()/(52-87) 105/52  Arterial Line BP: ()/(34-63) 117/46    Physical Exam:   General Appearance: alert, appears stated age and cooperative   Lungs: clear to auscultation, respirations regular, respirations even and respirations unlabored   Heart: regular rhythm & normal rate, normal S1, S2, no murmur, no gallop, no rub and no click   Skin: Warm, dry   Groins: Minimal ecchymosis, good distal pulses.  Results   Results from last 7 days   Lab Units 12/17/19  0334   WBC 10*3/mm3 9.77   HEMOGLOBIN g/dL 11.3*   HEMATOCRIT % 35.7   PLATELETS 10*3/mm3 149     Results from last 7 days   Lab Units 12/17/19  0334   SODIUM mmol/L 139   POTASSIUM mmol/L 3.7   CHLORIDE mmol/L 108*   CO2 mmol/L 22.0   BUN mg/dL 11   CREATININE mg/dL 0.62   GLUCOSE mg/dL 147*   CALCIUM mg/dL 8.9           Imaging Results (Last 24 Hours)     Procedure Component Value Units Date/Time    XR Chest 1 View [[id number]] Collected:  12/16/19 1450     Updated:  12/16/19 1703    Narrative:       EXAMINATION: XR CHEST 1 VW- 12/16/2019     INDICATION: Post-Op Check Line & Tube Placement; I35.9-Nonrheumatic  aortic valve disorder, unspecified      COMPARISON: 12/15/2019     FINDINGS: Portable chest reveals patient is status post valvular  replacement. Development of some increased markings at the left lung  base concerning for possible aspiration. Findings are new when compared  to the prior study. There is some increased markings as well seen within  the upper lung fields. Slight prominence of the  pulmonary vascularity.            Impression:       Patient status post valvular replacement. Prominence of the  pulmonary vascularity with no increased markings seen at the left lung  base in which infiltrate cannot be excluded.     D:  12/16/2019  E:  12/16/2019     This report was finalized on 12/16/2019 5:00 PM by Dr. Josie Melendez MD.             Assessment      Severe aortic stenosis    Aortic valve disorder  Seeing patient for severe aortic stenosis      Plan   Discharge home today.    EDUARD Workman  12/17/19  6:44 AM

## 2019-12-17 NOTE — PROGRESS NOTES
Discharge Summary TAVR    Date of Admission: 12/16/2019  Date of Discharge:  12/17/2019    PCP: Kimberly Anglin FNP  ATTENDING: Pierce Encinas MD  Primary Cardiologist: Dr. Camilo Trent    Consults:   Consulting Physician(s)     Provider Relationship Specialty    Charlie See MD Consulting Physician Pulmonary Disease        TAVR Team  1.  Major Rodríguez MD  2.  Pierce Encinas MD  3.  Augustin Baker MD  4.  Veronica Rodney MD    Presenting Problem/ HPI: Patient is a 69 y.o. female from [place], [place] who presented to PCP in October for annual visit.  A cardiac murmur was noted and she was sent for echo.  This showed severe aortic stenosis.  Patient was referred to Cardiology and she underwent cardiac catheter.  Normal coronary arteries were found and she was referred to CT Surgery for consideration of AVR.  TAVR was chosen as the preferred treatment plan after CTA indicated appropriate vasculature and favorable coronary anatomy.      Discharge Diagnosis:     1.  Severe aortic stenosis, bicuspid valve    2.  Chronic diastolic heart failure due to valvular disease.  NYHA class I-II      Procedures Performed:   Transfemoral percutaneous transcatheter aortic valve replacement utilizing 23 mm Garcia Antoni 3 pericardial prosthesis.    Hospital Course:The patient is an 69 y.o. female from [address] [place] [address] with symptoms of severe aortic stenosis including fatigue and reduced exercise tolerance which began to worsen since summer 2019.  An echocardiogram was done 10/31/19 which revealed the aortic valve indices to meet TAVR criteria, which included the aortic valve area of 0.6 sq cm, mean gradient 79 mmHg and aortic valve V-max 5.3 m/sec.   She was evaluated by Dr. Encinas and deemed to be low risk of mortality with traditional open AVR primarily based upon STS risk score of 0.67%. Since her anatomy was amenable for TAVR, this was chosen as the procedure of choice per patient, CT Surgeon, and  Interventional Cardiology.  She was admitted the morning of the scheduled OR procedure on 12/016/19.    Ms. Hernandez was taken to the OR in fasting condition and placed under general anesthesia.  Left femoral arterial and venous access was obtained by Subhash Rodney.  A temporary transvenous pacing electrode was inserted and advanced.  Right femoral arterial acces was obtained by Dr. Encinas to advance the Garcia delivery system.  Balloon aortic valvuloplasty was performed with 20 mm balloon under rapid pacing protocol.  Afterwards, the 23 mm Garcia Antoni valve was advanced and positioned under fluoroscopy.  It was then expanded under rapid pacing protocol.  Satisfactory position was confirmed with SAW and aortogram.  Trivial aortic paravalvular insufficiency noted.  The delivery system was removed.  Dr. Encinas performed completion aortogram which revealed intact vasculature.  Two Perclose devices were deployed for hemostasis.  Patient tolerated extubation in the OR and was transferred to CT ICU in stable condition.      Upon arrival in ICU, patient was evaluated by Intensivist, Dr. Charlie See. Initial vital signs were stable with temp 97.5, HR 80, /63.  Low dose cardene was used to maintain SBP <140 mm hg.  Left groin femoral and arterial sheaths were pulled by nursing staff at approximately 1630.  She tolerated this without difficulty.  No bleeding or hematoma formation noted. Cardene drip weaned off overnight.       POD #1:  Right radial arterial line discontinued.  Supplemental oxygen weaned off.  Stable SaO2 on room air.  Good urine output overnight.  Femoral access sites remained stable without bleeding or hematoma.  Patient ambulated without difficulty and was deemed stable and ready for DC home with family.    Physical Exam on date of discharge:     Temp  Min: 97.3 °F (36.3 °C)  Max: 98.2 °F (36.8 °C)   BP  Min: 95/60  Max: 125/69   Pulse  Min: 53  Max: 101   Resp  Min: 16  Max: 20   SpO2  Min:  "89 %  Max: 99 %   Flow (L/min)  Min: 2  Max: 3   Weight  Min: 60.3 kg (133 lb)  Max: 60.3 kg (133 lb)         Flowsheet Rows      First Filed Value   Admission Height  167.6 cm (66\") Documented at 12/16/2019 1034   Admission Weight  60.3 kg (133 lb) Documented at 12/16/2019 1034       General: Alert and oriented. Up in Chair  Cardiovascular: Heart has a nondisplaced focal PMI. Regular rate and rhythm without murmur, gallop or rub.  Lungs: Clear without rales or wheezes. Equal expansion is noted.   Abdomen: Soft, nontender. + bowel sounds noted  Extremities: Show no edema.   Skin: warm and dry. Bilateral groin sites- clean, no bleeding or hematoma noted. Non tender.   Neuro: no focal deficits    Pertinent Test Results:   Basic Metabolic Panel   Order: [id number]   Status:  Final result   Visible to patient:  No (Not Released) Next appt:  01/22/2020 at 02:00 PM in Cardiology (Veronica Rodney MD)   Component  Ref Range & Units 03:34  (12/17/19) 1d ago  (12/16/19) 1d ago  (12/16/19) 1d ago  (12/16/19) 1d ago  (12/16/19)   Glucose  65 - 99 mg/dL 147High   199High  R 128 R 138High   98 R   BUN  8 - 23 mg/dL 11    12     Creatinine  0.57 - 1.00 mg/dL 0.62    0.61     Sodium  136 - 145 mmol/L 139    141     Potassium  3.5 - 5.2 mmol/L 3.7    3.4Low      Chloride  98 - 107 mmol/L 108High     108High      CO2  22.0 - 29.0 mmol/L 22.0    22.0     Calcium  8.6 - 10.5 mg/dL 8.9    8.5Low      eGFR Non African Amer  >60 mL/min/1.73 95    97     BUN/Creatinine Ratio  7.0 - 25.0 17.7    19.7             Contains abnormal data CBC (No Diff)   Order: [id number]   Status:  Final result   Visible to patient:  No (Not Released) Next appt:  01/22/2020 at 02:00 PM in Cardiology (Veronica Rodney MD)   Component  Ref Range & Units 03:34 1d ago 2d ago   WBC  3.40 - 10.80 10*3/mm3 9.77  7.56  4.20    RBC  3.77 - 5.28 10*6/mm3 3.91  4.11  4.27    Hemoglobin  12.0 - 15.9 g/dL 11.3Low   12.1  12.7    Hematocrit  34.0 - 46.6 % 35.7  " 37.6  39.4    MCV  79.0 - 97.0 fL 91.3  91.5  92.3    MCH  26.6 - 33.0 pg 28.9  29.4  29.7    MCHC  31.5 - 35.7 g/dL 31.7  32.2  32.2    RDW  12.3 - 15.4 % 12.6  12.8  12.8    RDW-SD  37.0 - 54.0 fl 41.9  42.2  43.2    MPV  6.0 - 12.0 fL 11.6  11.6  11.4    Platelets  140 - 450 10*3/mm3 149  141  195                EXAMINATION: XR CHEST 1 VW- 12/16/2019      COMPARISON: 12/15/2019     FINDINGS: Portable chest reveals patient is status post valvular  replacement. Development of some increased markings at the left lung  base concerning for possible aspiration. Findings are new when compared  to the prior study. There is some increased markings as well seen within  the upper lung fields. Slight prominence of the pulmonary vascularity.          IMPRESSION:  Patient status post valvular replacement. Prominence of the  pulmonary vascularity with no increased markings seen at the left lung  base in which infiltrate cannot be excluded.         Discharge Disposition  Home or Self Care    Discharge Medications     New Medications      Instructions Start Date   clopidogrel 75 MG tablet  Commonly known as:  PLAVIX   75 mg, Oral, Daily         Continue These Medications      Instructions Start Date   aspirin 81 MG EC tablet   81 mg, Oral, Daily      fexofenadine 180 MG tablet  Commonly known as:  ALLEGRA   180 mg, Oral, Daily             Discharge Diet:   Diet Instructions     Diet: Cardiac; Thin      Discharge Diet:  Cardiac    Fluid Consistency:  Thin          Activity at Discharge:   Activity Instructions     Gradually Increase Activity Until at Pre-Hospitalization Level      Lifting Restrictions      Type of Restriction:  Lifting    Lifting Restrictions:  Lifting Restriction (Indicate Limit)    Weight Limit (Pounds):  10    Length of Lifting Restriction:  3 weeks          Special Instructions:   1.  Incision care: Check groin site daily. Clean daily with a clean washcloth, soap and water. Keep dry.  Report erythema,  drainage, swelling or significant tenderness to [facility] CT Surgery or to Penny JOHN at [facility].   2.  Patient may shower, but no tub baths until CT Surgery followup.   3.  Walk daily starting with 5 minutes four times daily.  Increase walking by one minute per walk as tolerated.    4.  No lifting over 10 lbs until CT Surgery follow up.  5.  No driving until released to do so by the surgeon.   6.  Weigh daily and report weight gain of 3 pounds overnight or 5 pounds in a week to [facility].   7.  Report symptoms of increased shortness of breath, chest pain, or temperature greater than 101degrees to [facility] at [phone] or [facility] CT Surgery [phone].    Follow-up Appointments  Future Appointments   Date Time Provider Department Center   1/22/2020  2:00 PM Veronica Rodney MD [facility] None     Dr. Satnam Samayoa 12/27/19 @ 10 am (Internal Medicine)    Thank you for allowing the [facility]id[facility] TAVR team to care for Ms. Albania Hernandez.  If you have any questions or concerns please call Penny JOHN at [facility] [phone].  Dr. Encinas may be reached at [phone] and Dr. Rodney may be reached at [phone].       DORA Copeland  12/17/19  9:38 AM

## 2019-12-17 NOTE — PLAN OF CARE
Problem: Patient Care Overview  Goal: Plan of Care Review  Flowsheets  Taken 12/16/2019 1834 by Sonya Woodruff, RN  Progress: improving  Taken 12/17/2019 0620 by Yady Callahan, RN  Plan of Care Reviewed With: patient  Note:   VSS, SB-SR overnight. RA. Cardene gtt off since last night. UOP 1L. R radial connor d/c'd this am. Inc & sheath site CDI, no hematomas. No c/o pain. Will continue to monitor.

## 2019-12-17 NOTE — PROGRESS NOTES
Clinical Nutrition     Multidisciplinary Rounds      Patient Name: Albania Hernandez  Date of Encounter: 12/17/19 9:18 AM  MRN: [id number]  Admission date: 12/16/2019      Reason for visit: MDR. RD to continue to follow per protocol.     Current diet: Diet Regular; Consistent Carbohydrate, Cardiac  No active supplement orders      EMR reviewed   MST=0    Intervention:  Follow treatment plan  Care plan reviewed    Follow up:   Per protocol      Kiya Muhammad RDN, TAYLOR  9:18 AM  Time: 10min

## 2019-12-17 NOTE — PROGRESS NOTES
"[facility] Daily Note       LOS: 1 day   Patient Care Team:  Kimberly Anglin FNP as PCP - General (Family Medicine)  Camilo Trent MD as Consulting Physician (Interventional Cardiology)    Chief Complaint:  Severe aortic stenosis, s/p TAVR with 23 mm Garcia JOSE 3 pericardial prosthesis       Subjective     Subjective: s/p TAVR yesterday. Small amt of oozing from left groin site which stopped with pressure overnight. No issues this AM. Has not ambulated yet.     Review of Systems:   As above.    Medications:    aspirin 81 mg Oral Daily   chlorhexidine 15 mL Mouth/Throat Q12H   clopidogrel 75 mg Oral Daily   insulin lispro 0-9 Units Subcutaneous 4x Daily With Meals & Nightly   mupirocin 1 application Each Nare Q12H   sodium chloride 3 mL Intravenous Q12H       Objective     Vital Sign Min/Max for last 24 hours  Temp  Min: 97.3 °F (36.3 °C)  Max: 98.2 °F (36.8 °C)   BP  Min: 95/60  Max: 125/69   Pulse  Min: 53  Max: 101   Resp  Min: 16  Max: 20   SpO2  Min: 89 %  Max: 99 %   Flow (L/min)  Min: 2  Max: 3   Weight  Min: 60.3 kg (133 lb)  Max: 60.3 kg (133 lb)      Intake/Output Summary (Last 24 hours) at 12/17/2019 0722  Last data filed at 12/17/2019 0600  Gross per 24 hour   Intake 1314 ml   Output 1725 ml   Net -411 ml        Flowsheet Rows      First Filed Value   Admission Height  167.6 cm (66\") Documented at 12/16/2019 1034   Admission Weight  60.3 kg (133 lb) Documented at 12/16/2019 1034          Physical Exam:    General: Alert and oriented.   Cardiovascular: Heart has a nondisplaced focal PMI. Regular rate and rhythm without murmur, gallop or rub.  Lungs: Clear without rales or wheezes. Equal expansion is noted.   Abdomen: Soft, nontender.  Extremities: Show no edema.   Skin: warm and dry. Bilateral groin sites- clean, no bleeding or hematoma noted. Non tender.      Results Review:    I reviewed the patient's new clinical results.  EKG:  Tele: NSR    Labs:    Results from last 7 days "   Lab Units 12/17/19  0334 12/16/19  1506 12/15/19  1127   SODIUM mmol/L 139 141 143   POTASSIUM mmol/L 3.7 3.4* 3.5   CHLORIDE mmol/L 108* 108* 107   CO2 mmol/L 22.0 22.0 26.0   BUN mg/dL 11 12 11   CREATININE mg/dL 0.62 0.61 0.75   CALCIUM mg/dL 8.9 8.5* 9.6   BILIRUBIN mg/dL  --   --  0.4   ALK PHOS U/L  --   --  60   ALT (SGPT) U/L  --   --  13   AST (SGOT) U/L  --   --  14   GLUCOSE mg/dL 147* 138* 79     Results from last 7 days   Lab Units 12/17/19  0334 12/16/19  1506 12/16/19  1341  12/15/19  1127   WBC 10*3/mm3 9.77 7.56  --   --  4.20   HEMOGLOBIN g/dL 11.3* 12.1  --   --  12.7   HEMOGLOBIN, POC g/dL  --   --  10.2*   < >  --    HEMATOCRIT % 35.7 37.6  --   --  39.4   HEMATOCRIT POC %  --   --  30*   < >  --    PLATELETS 10*3/mm3 149 141  --   --  195    < > = values in this interval not displayed.     No results found for: TROPONINI, TROPONINT  No results found for: CHOL  No results found for: TRIG  No results found for: HDL  No components found for: LDLCALC  Lab Results   Component Value Date    INR 1.08 12/16/2019    INR 0.97 12/15/2019    PROTIME 13.5 12/16/2019    PROTIME 12.4 12/15/2019         Ejection Fraction:    Assessment   Assessment:  1) Severe aortic stenosis  - now s/p TAVR with 23 mm Garcia JOSE 3 pericardial prosthesis, 12/16/18   - no complications post-operatively.       Plan:  - plans for D/C home later today if ambulates without difficulty.   - Will need outpatient f/u with Dr. Rodney in 4 weeks.       Electronically signed by DORA Tapia, 12/17/19, 7:22 AM.    Seen independently by D[facility] Tapia MD, [facility]     male

## 2019-12-18 ENCOUNTER — APPOINTMENT (OUTPATIENT)
Dept: CT IMAGING | Facility: HOSPITAL | Age: 69
End: 2019-12-18

## 2019-12-18 ENCOUNTER — READMISSION MANAGEMENT (OUTPATIENT)
Dept: CALL CENTER | Facility: HOSPITAL | Age: 69
End: 2019-12-18

## 2019-12-18 ENCOUNTER — APPOINTMENT (OUTPATIENT)
Dept: CARDIOLOGY | Facility: HOSPITAL | Age: 69
End: 2019-12-18

## 2019-12-18 RX ORDER — CLOPIDOGREL BISULFATE 75 MG/1
75 TABLET ORAL DAILY
Qty: 30 TABLET | Refills: 2 | Status: SHIPPED | OUTPATIENT
Start: 2019-12-18 | End: 2020-01-15 | Stop reason: SDUPTHER

## 2019-12-18 NOTE — OUTREACH NOTE
Prep Survey      Responses   Facility patient discharged from?  [facility]   Is patient eligible?  No   What are the reasons patient is not eligible?  Other [Pt admitted for TAVR procedure not CHF]   Does the patient have one of the following disease processes/diagnoses(primary or secondary)?  Cardiothoracic surgery   Prep survey completed?  Yes          Mayelin Gooden RN

## 2019-12-19 LAB
ABO + RH BLD: NORMAL
ABO + RH BLD: NORMAL
BH BB BLOOD EXPIRATION DATE: NORMAL
BH BB BLOOD EXPIRATION DATE: NORMAL
BH BB BLOOD TYPE BARCODE: 9500
BH BB BLOOD TYPE BARCODE: 9500
BH BB DISPENSE STATUS: NORMAL
BH BB DISPENSE STATUS: NORMAL
BH BB PRODUCT CODE: NORMAL
BH BB PRODUCT CODE: NORMAL
BH BB UNIT NUMBER: NORMAL
BH BB UNIT NUMBER: NORMAL
UNIT  ABO: NORMAL
UNIT  ABO: NORMAL
UNIT  RH: NORMAL
UNIT  RH: NORMAL

## 2019-12-20 NOTE — DISCHARGE SUMMARY
CTS Discharge Summary    Patient Care Team:  Kimberly Anglin FNP as PCP - General (Family Medicine)  Camilo Trent MD as Consulting Physician (Interventional Cardiology)      Date of Admission: 12/16/2019  8:59 AM  Date of Discharge: 12/17/2019    Discharge Diagnosis  Past Medical History:   Diagnosis Date   • Aortic stenosis    • Arthritis    • Murmur    • Osteitis of symphysis pubis (CMS/HCC)    • Seasonal allergies    • Skin cancer of face     basal cell   • Sleep apnea     has not had sleep study yet, diagnosed during SAW     Patient Active Problem List   Diagnosis   • Severe aortic stenosis   • Chronic diastolic congestive heart failure (CMS/HCC)   • Aortic valve disorder           Severe aortic stenosis    Chronic diastolic congestive heart failure (CMS/HCC)    Aortic valve disorder      History of Present IllnessPatient is a 69 y.o. female who presents today for a TAVR. She was seen by her PCP for her yearly physical and was found to have a murmur, which the patient notes having for many years. Her primary care physician referred her to cardiology for a work up as she had never had a prior evaluation of her murmur. During her work up, she was found to have severe aortic stenosis and after having a discussion with Dr. Encinas and his APRN, she has opted for a TAVR.      She continues to have mild dyspnea on exertion and mild fatigue, neither of which has worsened since her office visit. She notes a UTI about one month for which she received antibiotic therapy. Her repeat urinalysis on 12/15/19 was negtive. She denies chest pain, fevers or chills.            Hospital Course  Patient is a 69 y.o. female after admission was taken the operating suite and underwent a TAVR replacement of her aortic valve.  Patient tolerated procedure well.  Taken the cardiothoracic unit in stable condition.  Postop was seen by the hospital intensivist while in the intensive care unit.  Postop day #1, awake alert and  cooperative.  Dynamically stable.  Is up ambulating.  Pain under good control.  Good distal pulses from the groin wound.  She was able to be discharged home.    Procedures Performed  Procedure(s):  TRANSCATHETER AORTIC VALVE REPLACEMENT WITH SAW  Transfemoral Transcatheter Aortic Valve Replacement       Consults:   Consults     No orders found from 11/17/2019 to 12/17/2019.            Discharge Medications     Discharge Medications      New Medications      Instructions Start Date   clopidogrel 75 MG tablet  Commonly known as:  PLAVIX   75 mg, Oral, Daily         Continue These Medications      Instructions Start Date   aspirin 81 MG EC tablet   81 mg, Oral, Daily      fexofenadine 180 MG tablet  Commonly known as:  ALLEGRA   180 mg, Oral, Daily             Discharge Diet:   Diet Instructions     Diet: Cardiac; Thin      Discharge Diet:  Cardiac    Fluid Consistency:  Thin          Activity at Discharge:   Activity Instructions     Gradually Increase Activity Until at Pre-Hospitalization Level      Lifting Restrictions      Type of Restriction:  Lifting    Lifting Restrictions:  Lifting Restriction (Indicate Limit)    Weight Limit (Pounds):  10    Length of Lifting Restriction:  3 weeks        Do not drive while taking narcotics    Follow-up Appointments  Future Appointments   Date Time Provider Department Center   1/21/2020 11:30 AM Anne Lockwood APRN [facility] None   1/21/2020  1:00 PM Penny Lux APRN [facility]   1/22/2020  2:00 PM Veronica Rodney MD [facility] None   This discharge took less than 30 minutes to compile.       EDUARD Workman  12/20/19  7:49 AM

## 2019-12-30 ENCOUNTER — TELEPHONE (OUTPATIENT)
Dept: CARDIAC SURGERY | Facility: CLINIC | Age: 69
End: 2019-12-30

## 2019-12-30 LAB — LV EF 2D ECHO EST: 55 %

## 2019-12-30 NOTE — TELEPHONE ENCOUNTER
----- Message from Pierce Encinas MD sent at 12/27/2019  7:33 PM EST -----  Ok to return to work    ----- Message -----  From: Rolan Aponte  Sent: 12/26/2019  12:29 PM EST  To: Pierce Encinas MD    Pt called today wanting to know if it is ok for her to return to work but her F/U apt with you on 01/15. She is S/P TAVR on 12/16/19. Please advise. Thanks

## 2020-01-15 ENCOUNTER — OFFICE VISIT (OUTPATIENT)
Dept: CARDIAC SURGERY | Facility: CLINIC | Age: 70
End: 2020-01-15

## 2020-01-15 VITALS
HEIGHT: 66 IN | SYSTOLIC BLOOD PRESSURE: 141 MMHG | WEIGHT: 133 LBS | OXYGEN SATURATION: 98 % | BODY MASS INDEX: 21.38 KG/M2 | HEART RATE: 96 BPM | DIASTOLIC BLOOD PRESSURE: 72 MMHG

## 2020-01-15 DIAGNOSIS — I35.0 SEVERE AORTIC STENOSIS: Primary | ICD-10-CM

## 2020-01-15 PROCEDURE — 99024 POSTOP FOLLOW-UP VISIT: CPT | Performed by: NURSE PRACTITIONER

## 2020-01-15 RX ORDER — CLOPIDOGREL BISULFATE 75 MG/1
75 TABLET ORAL DAILY
Qty: 90 TABLET | Refills: 0 | Status: SHIPPED | OUTPATIENT
Start: 2020-01-15 | End: 2020-01-24 | Stop reason: ALTCHOICE

## 2020-01-15 NOTE — PROGRESS NOTES
"     [facility] Cardiothoracic Surgery Follow-Up Note    Name:  Albania Hernandez  MRN Number:  [id number]  Date of Encounter:  01/15/2020    Referred By:  No ref. provider found  PCP:  Kimberly Anglin FNP    Chief Complaint:    Chief Complaint   Patient presents with   • Follow-up     Hosp. F/U  S/P TAVR on 12/16/19       History of Present Illness:    Ms. Albania Hernandez is a pleasant 69 y.o. female with a history of aortic valve stenosis status post TAVR 12/16/2019 per Dr. Encinas who returns the office today for postoperative exam.  The patient reports that she is feeling \"better\".  Prior to her surgery, she was experiencing fatigue and shortness of air that have improved since her surgery.  She denies pain in her groin, shortness of air and difficulty with ambulation.  She is scheduled to have her post-op echo on 1/22/20 and will return to see DORA Moralez and Dr. Rodney on this day as well.  Overall, she is doing well.    Review of Systems:  Review of Systems   Constitution: Negative for chills, fever, malaise/fatigue, night sweats and weight loss.   HENT: Negative for congestion, hearing loss, nosebleeds and odynophagia.    Cardiovascular: Negative for chest pain, claudication, dyspnea on exertion, leg swelling, orthopnea, palpitations and syncope.   Respiratory: Negative for cough, hemoptysis, shortness of breath and wheezing.    Endocrine: Negative for cold intolerance, heat intolerance, polydipsia, polyphagia and polyuria.   Hematologic/Lymphatic: Bruises/bleeds easily.   Skin: Negative for itching, poor wound healing and rash.   Musculoskeletal: Positive for joint pain. Negative for arthritis, back pain, joint swelling and myalgias.   Gastrointestinal: Negative for abdominal pain, constipation, diarrhea, hematemesis, melena, nausea and vomiting.   Genitourinary: Negative for dysuria, frequency, hematuria, nocturia and urgency.   Neurological: Negative for dizziness, light-headedness, " loss of balance and numbness.   Psychiatric/Behavioral: Negative for depression and suicidal ideas. The patient is not nervous/anxious.    Allergic/Immunologic: Negative for environmental allergies and HIV exposure.       Past Medical History:    Past Medical History:   Diagnosis Date   • Aortic stenosis    • Arthritis    • Murmur    • Osteitis of symphysis pubis (CMS/HCC)    • Seasonal allergies    • Skin cancer of face     basal cell   • Sleep apnea     has not had sleep study yet, diagnosed during SAW       Past Surgical History:    Past Surgical History:   Procedure Laterality Date   • AORTIC VALVE REPAIR/REPLACEMENT N/A 12/16/2019    Procedure: TRANSCATHETER AORTIC VALVE REPLACEMENT WITH SAW;  Surgeon: Pierce Encinas MD;  Location: [facility] OR 15;  Service: Cardiothoracic   • AORTIC VALVE REPAIR/REPLACEMENT N/A 12/16/2019    Procedure: Transfemoral Transcatheter Aortic Valve Replacement;  Surgeon: Veronica Rodney MD;  Location:  [facility] OR 15;  Service: Cardiovascular   • BASAL CELL CARCINOMA EXCISION      LEFT SIDE OF FACE UNDER EYE   • CARDIAC CATHETERIZATION     • COLONOSCOPY  up to date   • COLONOSCOPY W/ BIOPSIES     • ENDOSCOPY     • HIATAL HERNIA REPAIR     • TRANSESOPHAGEAL ECHOCARDIOGRAM (SAW)     • TUBAL ABDOMINAL LIGATION         Patient Active Problem List   Diagnosis   • Severe aortic stenosis   • Chronic diastolic congestive heart failure (CMS/HCC)   • Aortic valve disorder     Social History     Tobacco Use   • Smoking status: Never Smoker   • Smokeless tobacco: Never Used   Substance Use Topics   • Alcohol use: No     Frequency: Never   • Drug use: No     Family History   Problem Relation Age of Onset   • Thyroid disease Mother    • Aneurysm Father        Medications:      Current Outpatient Medications:   •  aspirin 81 MG EC tablet, Take 81 mg by mouth Daily., Disp: , Rfl:   •  clopidogrel (PLAVIX) 75 MG tablet, Take 1 tablet by mouth Daily., Disp: 30 tablet, Rfl: 2  •   "fexofenadine (ALLEGRA) 180 MG tablet, Take 180 mg by mouth Daily., Disp: , Rfl:     Allergies:  No Known Allergies    Physical Exam:  Vital Signs:    Vitals:    01/15/20 1214   BP: 141/72   Pulse: 96   SpO2: 98%   Weight: 60.3 kg (133 lb)   Height: 167.6 cm (66\")       Physical Exam   Gen- NAD, pleasant, cooperative  CV- Regular rate and rhythm, no gallop or rub, slight murmur heard.  Pulm- Clear to auscultation bilateral without wheeze or rhonchi  GI- Soft, normoactive bowel sounds, non-tender  Ext- Without edema,   Incision- Well approximated right groin site with no erythema, drainage or dehiscence noted.  Neuro- CN II- XII grossly intact, tongue midline, voice normal.    Labs/Imaging:  No imaging was obtained in the office today.    Assessment / Plan:  Ms. Albania Hernandez is a pleasant 69 y.o. female with a history of aortic valve stenosis status post TAVR 12/16/2019 per Dr. Encinas who returns the office today for postoperative exam.  The patient is having a steady post-operative course.  Her groin sites are healing with well no pseudoaneurysm noted.  She has had steadily improving resolution of her fatigue and shortness of air.  She has a post-op echo scheduled for 1/22/20 and follow up appointments scheduled for Dr. Rodney and DORA Garcia on 1/22/20 as well.  On physical exam, the patient is still noted to have a slight murmur which may be attributable to her mild MVR.  From a surgical standpoint, the patient is doing well.  I have discussed with the patient that medical management will now be resumed by cardiology and she will see her on as needed basis.  Should she have any questions or concerns, she may contact the office.    Follow Up:  As needed.      Please note, this document was produced using voice recognition software.    DORA Raymond  [facility] Cardiothoracic Surgery  "

## 2020-01-22 ENCOUNTER — OFFICE VISIT (OUTPATIENT)
Dept: CARDIOLOGY | Facility: HOSPITAL | Age: 70
End: 2020-01-22

## 2020-01-22 ENCOUNTER — HOSPITAL ENCOUNTER (OUTPATIENT)
Dept: CARDIOLOGY | Facility: HOSPITAL | Age: 70
Discharge: HOME | End: 2020-01-22
Admitting: NURSE PRACTITIONER

## 2020-01-22 ENCOUNTER — OFFICE VISIT (OUTPATIENT)
Dept: CARDIOLOGY | Facility: CLINIC | Age: 70
End: 2020-01-22

## 2020-01-22 VITALS
SYSTOLIC BLOOD PRESSURE: 126 MMHG | HEART RATE: 70 BPM | OXYGEN SATURATION: 98 % | WEIGHT: 132 LBS | BODY MASS INDEX: 21.21 KG/M2 | HEIGHT: 66 IN | DIASTOLIC BLOOD PRESSURE: 74 MMHG

## 2020-01-22 VITALS
WEIGHT: 134.13 LBS | HEART RATE: 72 BPM | DIASTOLIC BLOOD PRESSURE: 84 MMHG | SYSTOLIC BLOOD PRESSURE: 152 MMHG | HEIGHT: 66 IN | BODY MASS INDEX: 21.56 KG/M2 | OXYGEN SATURATION: 99 % | RESPIRATION RATE: 18 BRPM | TEMPERATURE: 98.1 F

## 2020-01-22 VITALS — HEIGHT: 66 IN | BODY MASS INDEX: 21.21 KG/M2 | WEIGHT: 132 LBS

## 2020-01-22 DIAGNOSIS — I35.0 SEVERE AORTIC STENOSIS: ICD-10-CM

## 2020-01-22 DIAGNOSIS — I50.32 CHRONIC DIASTOLIC CONGESTIVE HEART FAILURE (HCC): ICD-10-CM

## 2020-01-22 DIAGNOSIS — Z95.2 S/P TAVR (TRANSCATHETER AORTIC VALVE REPLACEMENT): ICD-10-CM

## 2020-01-22 DIAGNOSIS — I35.0 SEVERE AORTIC STENOSIS: Primary | ICD-10-CM

## 2020-01-22 PROBLEM — I48.0 PAROXYSMAL ATRIAL FIBRILLATION: Status: ACTIVE | Noted: 2020-01-22

## 2020-01-22 PROBLEM — I50.22 CHRONIC SYSTOLIC CONGESTIVE HEART FAILURE (HCC): Status: ACTIVE | Noted: 2019-12-10

## 2020-01-22 LAB
BH CV ECHO MEAS - AO MAX PG (FULL): 23.8 MMHG
BH CV ECHO MEAS - AO MAX PG: 27 MMHG
BH CV ECHO MEAS - AO MEAN PG (FULL): 14 MMHG
BH CV ECHO MEAS - AO MEAN PG: 15 MMHG
BH CV ECHO MEAS - AO ROOT AREA (BSA CORRECTED): 1.7
BH CV ECHO MEAS - AO ROOT AREA: 6.6 CM^2
BH CV ECHO MEAS - AO ROOT DIAM: 2.9 CM
BH CV ECHO MEAS - AO V2 MAX: 265 CM/SEC
BH CV ECHO MEAS - AO V2 MEAN: 188 CM/SEC
BH CV ECHO MEAS - AO V2 VTI: 58.7 CM
BH CV ECHO MEAS - AVA(I,A): 1 CM^2
BH CV ECHO MEAS - AVA(I,D): 1.2 CM^2
BH CV ECHO MEAS - AVA(V,A): 0.89 CM^2
BH CV ECHO MEAS - AVA(V,D): 0.89 CM^2
BH CV ECHO MEAS - BSA(HAYCOCK): 1.7 M^2
BH CV ECHO MEAS - BSA: 1.7 M^2
BH CV ECHO MEAS - BZI_BMI: 21.3 KILOGRAMS/M^2
BH CV ECHO MEAS - BZI_METRIC_HEIGHT: 167.6 CM
BH CV ECHO MEAS - BZI_METRIC_WEIGHT: 59.9 KG
BH CV ECHO MEAS - EDV(CUBED): 50.7 ML
BH CV ECHO MEAS - EDV(MOD-SP2): 142 ML
BH CV ECHO MEAS - EDV(MOD-SP4): 127 ML
BH CV ECHO MEAS - EDV(TEICH): 58.1 ML
BH CV ECHO MEAS - EF(CUBED): 67.3 %
BH CV ECHO MEAS - EF(MOD-BP): 53 %
BH CV ECHO MEAS - EF(MOD-SP2): 60.6 %
BH CV ECHO MEAS - EF(MOD-SP4): 46.5 %
BH CV ECHO MEAS - EF(TEICH): 59.7 %
BH CV ECHO MEAS - ESV(CUBED): 16.6 ML
BH CV ECHO MEAS - ESV(MOD-SP2): 56 ML
BH CV ECHO MEAS - ESV(MOD-SP4): 68 ML
BH CV ECHO MEAS - ESV(TEICH): 23.4 ML
BH CV ECHO MEAS - FS: 31.1 %
BH CV ECHO MEAS - IVS/LVPW: 0.82
BH CV ECHO MEAS - IVSD: 1.1 CM
BH CV ECHO MEAS - LA DIMENSION: 3.7 CM
BH CV ECHO MEAS - LA/AO: 1.3
BH CV ECHO MEAS - LAD MAJOR: 5.1 CM
BH CV ECHO MEAS - LAT PEAK E' VEL: 7.8 CM/SEC
BH CV ECHO MEAS - LATERAL E/E' RATIO: 9.9
BH CV ECHO MEAS - LV DIASTOLIC VOL/BSA (35-75): 75.8 ML/M^2
BH CV ECHO MEAS - LV MASS(C)D: 146.4 GRAMS
BH CV ECHO MEAS - LV MASS(C)DI: 87.3 GRAMS/M^2
BH CV ECHO MEAS - LV MAX PG: 4.3 MMHG
BH CV ECHO MEAS - LV MEAN PG: 2 MMHG
BH CV ECHO MEAS - LV SYSTOLIC VOL/BSA (12-30): 40.6 ML/M^2
BH CV ECHO MEAS - LV V1 MAX: 104 CM/SEC
BH CV ECHO MEAS - LV V1 MEAN: 62.2 CM/SEC
BH CV ECHO MEAS - LV V1 VTI: 30.2 CM
BH CV ECHO MEAS - LVIDD: 3.7 CM
BH CV ECHO MEAS - LVIDS: 2.6 CM
BH CV ECHO MEAS - LVLD AP2: 9.3 CM
BH CV ECHO MEAS - LVLD AP4: 9.3 CM
BH CV ECHO MEAS - LVLS AP2: 8 CM
BH CV ECHO MEAS - LVLS AP4: 7.6 CM
BH CV ECHO MEAS - LVOT AREA (M): 2.3 CM^2
BH CV ECHO MEAS - LVOT AREA: 2.3 CM^2
BH CV ECHO MEAS - LVOT DIAM: 1.7 CM
BH CV ECHO MEAS - LVPWD: 1.1 CM
BH CV ECHO MEAS - MED PEAK E' VEL: 5.2 CM/SEC
BH CV ECHO MEAS - MEDIAL E/E' RATIO: 14.9
BH CV ECHO MEAS - MV A MAX VEL: 95.3 CM/SEC
BH CV ECHO MEAS - MV DEC TIME: 0.28 SEC
BH CV ECHO MEAS - MV E MAX VEL: 77 CM/SEC
BH CV ECHO MEAS - MV E/A: 0.81
BH CV ECHO MEAS - PA ACC SLOPE: 889 CM/SEC^2
BH CV ECHO MEAS - PA ACC TIME: 0.11 SEC
BH CV ECHO MEAS - PA PR(ACCEL): 28.2 MMHG
BH CV ECHO MEAS - RAP SYSTOLE: 8 MMHG
BH CV ECHO MEAS - RVDD: 2.7 CM
BH CV ECHO MEAS - RVSP: 26 MMHG
BH CV ECHO MEAS - SI(AO): 259.7 ML/M^2
BH CV ECHO MEAS - SI(CUBED): 20.3 ML/M^2
BH CV ECHO MEAS - SI(LVOT): 40.9 ML/M^2
BH CV ECHO MEAS - SI(MOD-SP2): 51.3 ML/M^2
BH CV ECHO MEAS - SI(MOD-SP4): 35.2 ML/M^2
BH CV ECHO MEAS - SI(TEICH): 20.7 ML/M^2
BH CV ECHO MEAS - SV(AO): 435.3 ML
BH CV ECHO MEAS - SV(CUBED): 34.1 ML
BH CV ECHO MEAS - SV(LVOT): 68.5 ML
BH CV ECHO MEAS - SV(MOD-SP2): 86 ML
BH CV ECHO MEAS - SV(MOD-SP4): 59 ML
BH CV ECHO MEAS - SV(TEICH): 34.7 ML
BH CV ECHO MEAS - TAPSE (>1.6): 1.43 CM2
BH CV ECHO MEAS - TR MAX PG: 18 MMHG
BH CV ECHO MEAS - TR MAX VEL: 214.8 CM/SEC
BH CV ECHO MEASUREMENTS AVERAGE E/E' RATIO: 11.85
BH CV VAS BP LEFT ARM: NORMAL MMHG
BH CV XLRA - RV BASE: 3.7 CM
BH CV XLRA - RV LENGTH: 5 CM
BH CV XLRA - RV MID: 2.9 CM
BH CV XLRA - TDI S': 9.83 CM/SEC
LEFT ATRIUM VOLUME INDEX: 25.7 ML/M^2
LEFT ATRIUM VOLUME: 43 ML
LV EF 2D ECHO EST: 60 %
MAXIMAL PREDICTED HEART RATE: 151 BPM
STRESS TARGET HR: 128 BPM

## 2020-01-22 PROCEDURE — 93306 TTE W/DOPPLER COMPLETE: CPT

## 2020-01-22 PROCEDURE — 99213 OFFICE O/P EST LOW 20 MIN: CPT | Performed by: INTERNAL MEDICINE

## 2020-01-22 PROCEDURE — 93306 TTE W/DOPPLER COMPLETE: CPT | Performed by: INTERNAL MEDICINE

## 2020-01-22 PROCEDURE — 99213 OFFICE O/P EST LOW 20 MIN: CPT | Performed by: NURSE PRACTITIONER

## 2020-01-22 RX ORDER — PHENOL 1.4 %
600 AEROSOL, SPRAY (ML) MUCOUS MEMBRANE DAILY
COMMUNITY

## 2020-01-22 NOTE — PROGRESS NOTES
"[facility]  TAVR Clinic    Encounter Date:01/22/2020     Albania Hernandez  [address] [place] [address]    1950    Kimberly Anglin FNP    Albania Hernandez is a 69 y.o. female.      Subjective:     Chief Complaint:  Cardiac Valve Problem (s/p TAVR)       HPI :  Ms. Hernandez comes in for one month post TAVR check.  She is back to work (cleaning churches/houses) and feeling good.  Patient states that is a hard question to answer because she really did not feel \"bad\" before surgery.  Now, she states she does not get out of breath and tired like she did before while cleaning.    No Known Allergies      Current Outpatient Medications:   •  aspirin 81 MG EC tablet, Take 81 mg by mouth Daily., Disp: , Rfl:   •  calcium carbonate (OS-SHAINA) 600 MG tablet, Take 600 mg by mouth Daily., Disp: , Rfl:   •  clopidogrel (PLAVIX) 75 MG tablet, Take 1 tablet by mouth Daily., Disp: 90 tablet, Rfl: 0  •  fexofenadine (ALLEGRA) 180 MG tablet, Take 180 mg by mouth Daily., Disp: , Rfl:     The following portions of the patient's history were reviewed and updated as appropriate in Epic:  Problem list, allergies, current medications, past medical and surgical history, past social and family history.     Review of Systems   Constitution: Negative.   HENT: Negative.    Eyes: Negative.    Cardiovascular: Negative for chest pain, dyspnea on exertion and palpitations.   Respiratory: Negative.    Endocrine: Negative.    Hematologic/Lymphatic: Bruises/bleeds easily.   Skin: Negative.    Musculoskeletal: Positive for arthritis.   Gastrointestinal: Negative.    Genitourinary: Negative.    Neurological: Negative.    Psychiatric/Behavioral: Negative.    Allergic/Immunologic: Negative.        Objective:     Vitals:    01/22/20 1250   BP: 152/84   BP Location: Right arm   Patient Position: Sitting   Cuff Size: Adult   Pulse: 72   Resp: 18   Temp: 98.1 °F (36.7 °C)   TempSrc: Temporal   SpO2: 99%   Weight: " "60.8 kg (134 lb 2 oz)   Height: 167.6 cm (66\")         Physical Exam   Constitutional: She is oriented to person, place, and time. She appears well-developed and well-nourished. No distress.   HENT:   Head: Normocephalic and atraumatic.   Eyes: Pupils are equal, round, and reactive to light. Conjunctivae are normal.   Neck: Normal range of motion. Neck supple. No JVD present.   Cardiovascular: Normal rate, regular rhythm and intact distal pulses.   Murmur heard.  Systolic murmur I/VI   Pulmonary/Chest: Effort normal and breath sounds normal. No respiratory distress. She has no wheezes. She has no rales.   Musculoskeletal: Normal range of motion. She exhibits no edema.   Neurological: She is alert and oriented to person, place, and time. No cranial nerve deficit.   Skin: Skin is warm and dry.   Psychiatric: She has a normal mood and affect. Her behavior is normal. Judgment and thought content normal.   Vitals reviewed.      Lab and Diagnostic Review: echo pending for this afternoon    Assessment and Plan:     1. Severe aortic stenosis  - bicuspid valve s/p TAVR 12/16/19 with 23 mm Garcia Antoni III  - post procedure echo this afternoon    2. Chronic diastolic congestive heart failure (CMS/HCC)  - Pre-op NYHA class I-II  - today 5 meter walk test 4.18 seconds (see scanned sheet) and KCCQ12 score is 63/70 (pre op was 61/70)    RTC in one year with echo        *Please note that portions of this note were completed with a voice recognition program. Efforts were made to edit the dictations, but occasionally words are mistranscribed.      "

## 2020-01-22 NOTE — PROGRESS NOTES
[facility] Cardiology    Patient ID: Albania Hernandez is a 69 y.o. female.  : 1950   Contact: [phone]    Encounter date: 2020    PCP: Kimberly Anglin FNP      Chief complaint:   Chief Complaint   Patient presents with   • Severe aortic stenosis     Problem List:  1. Severe aortic stenosis:  a. LHC 2019, [facility]: Nonobstructive CAD with 20-30% mid LAD lesion   b. Echo 10/31/2019: Calcified AV, severe AS with BALDO 0.62cm2, mean gradient 70mmHg, max gradient 120mmHg, trace AI. Mild LVH, EF 55-60%, mild MR.   c. Class II VELAZQUEZ  d. SAW, 2019: Severe AS, mean gradient 71.2 mmHg, BALDO 0.5 cm2.  e. TAVR, 2019, with 23 mm Garcia Antoni 3 pericardial prosthesis.  2. History of hiatal hernia, s/p repair   3. Arthritis  4. Osteoporosis      No Known Allergies    Current Medications:      Current Outpatient Medications:   •  aspirin 81 MG EC tablet, Take 81 mg by mouth Daily., Disp: , Rfl:   •  calcium carbonate (OS-SHAINA) 600 MG tablet, Take 600 mg by mouth Daily., Disp: , Rfl:   •  clopidogrel (PLAVIX) 75 MG tablet, Take 1 tablet by mouth Daily., Disp: 90 tablet, Rfl: 0  •  fexofenadine (ALLEGRA) 180 MG tablet, Take 180 mg by mouth Daily., Disp: , Rfl:     HPI    Albania Hernandez is a 69 y.o. female who presents today for one month hospital follow up s/p TAVR. Since then, she has been feeling well overall from a cardiovascular standpoint. 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 Patient denies chest pain, palpitations, shortness of breath, edema, dizziness, and syncope.        The following portions of the patient's history were reviewed and updated as appropriate: allergies, current medications and problem list.    Pertinent positives as listed in the HPI.  All other systems reviewed are negative.         Vitals:    20 1420   BP: 126/74   BP Location: Right arm   Patient Position:  "Sitting   Pulse: 70   SpO2: 98%   Weight: 59.9 kg (132 lb)   Height: 167.6 cm (66\")       Physical Exam:  General: Alert and oriented.  Neck: Jugular venous pressure is within normal limits. Carotids have normal upstrokes without bruits.   Cardiovascular: Heart has a nondisplaced focal PMI. Regular rate and rhythm without murmur, gallop or rub.  Lungs: Clear without rales or wheezes. Equal expansion is noted.   Extremities: Show no edema.  Skin: Warm and dry.  Neurologic: Nonfocal.     Diagnostic Data:  Lab Results   Component Value Date    GLUCOSE 147 (H) 12/17/2019    BUN 11 12/17/2019    CREATININE 0.62 12/17/2019    EGFRIFNONA 95 12/17/2019    BCR 17.7 12/17/2019     12/17/2019    K 3.7 12/17/2019     (H) 12/17/2019    CO2 22.0 12/17/2019    CALCIUM 8.9 12/17/2019    ALBUMIN 4.40 12/15/2019    AST 14 12/15/2019    ALT 13 12/15/2019      Lab Results   Component Value Date    WBC 9.77 12/17/2019    HGB 11.3 (L) 12/17/2019    HCT 35.7 12/17/2019    MCV 91.3 12/17/2019     12/17/2019           Assessment:    ICD-10-CM ICD-9-CM   1. Severe aortic stenosis I35.0 424.1   2. S/P TAVR (transcatheter aortic valve replacement) Z95.2 V43.3   3. Chronic diastolic congestive heart failure (CMS/[facility]) I50.32 428.32     428.0     Lab results found above were reviewed with the patient.      Plan:  1. Stable cardiac status overall.  2. We will call the patient with the results of her echocardiogram scheduled for 3 PM today.  3. Repeat echocardiogram for f/up in a year.  4. Continue aspirin indefinitely and Plavix for 3 months s/p TAVR.  5. Continue all other current medications.  6. F/up in 12 months, sooner if needed.      Scribed for Veronica Rodney MD by Radha Araujo. 1/22/2020  2:31 PM     I Veronica Rodney MD personally performed the services described in this documentation as scribed by the above individual in my presence, and it is both accurate and complete.    Veronica Rodney MD, " FACC

## 2020-01-23 ENCOUNTER — TELEPHONE (OUTPATIENT)
Dept: CARDIOLOGY | Facility: HOSPITAL | Age: 70
End: 2020-01-23

## 2020-01-24 ENCOUNTER — TELEPHONE (OUTPATIENT)
Dept: CARDIOLOGY | Facility: HOSPITAL | Age: 70
End: 2020-01-24

## 2020-01-24 DIAGNOSIS — Z95.2 S/P TAVR (TRANSCATHETER AORTIC VALVE REPLACEMENT): ICD-10-CM

## 2020-01-24 DIAGNOSIS — I35.0 SEVERE AORTIC STENOSIS: Primary | ICD-10-CM

## 2020-01-24 RX ORDER — WARFARIN SODIUM 2 MG/1
2 TABLET ORAL DAILY
Qty: 30 TABLET | Refills: 3 | Status: SHIPPED | OUTPATIENT
Start: 2020-01-24 | End: 2020-10-28

## 2020-01-24 NOTE — TELEPHONE ENCOUNTER
Echo post TAVR reviewed with Dr. Rodney.  Mean gradient for TAVR prosthesis 15 mm Hg.  Dr. Rodney recommends to DC plavix and start warfarin for three months.  RE-check TTE in three months to re-evaluate.  Spoke with Ms. Hernandez by phone and reviewed TTE results/ recommendations/ information about warfarin in general.  Also contacted patient's PCP to update her regarding need to monitor PT/INR for short term warfarin.  Had to leave message.

## 2020-02-10 ENCOUNTER — TELEPHONE (OUTPATIENT)
Dept: CARDIOLOGY | Facility: HOSPITAL | Age: 70
End: 2020-02-10

## 2020-02-10 NOTE — TELEPHONE ENCOUNTER
Again, reached out to PCP for update on post op use of warfarin and plans for repeat echo in May.  Left message.    Penny JOHN

## 2020-04-21 DIAGNOSIS — Z95.2 S/P TAVR (TRANSCATHETER AORTIC VALVE REPLACEMENT): ICD-10-CM

## 2020-04-21 DIAGNOSIS — I35.0 SEVERE AORTIC STENOSIS: Primary | ICD-10-CM

## 2020-05-05 NOTE — PROGRESS NOTES
[facility] Cardiology    Patient ID: Albania Hernandez is a 69 y.o. female. She is a retired teacher.  : 1950   Contact: [phone]    Encounter date: 2020    PCP: Kimberly Anglin FNP      Chief complaint:   Chief Complaint   Patient presents with   • Severe aortic stenosis     s/p TAVR       Problem List:  1. Aortic valve disease:  a. LHC, 2019, [facility]: Nonobstructive CAD with 20-30% mid LAD lesion   b. Echo, 10/31/2019: Calcified AV, severe AS with BALDO 0.62cm2, mean gradient 70mmHg, max gradient 120mmHg, trace AI. Mild LVH, EF 55-60%, mild MR.   c. Class II VELAZQUEZ  d. SAW, 2019: Severe AS, mean gradient 71.2 mmHg, BALDO 0.5 cm2.  e. S/p TAVR, 2019, with 23 mm Garcia Antoni 3 pericardial prosthesis. Intra-op TAVR SAW showed mean PG 9 mmHg with BALDO 1.9 cm2.  f. Echo, 2020: EF 60%. S/p TAVR with 23 mm Garcia Antoni 3 pericardial prosthesis. Mean PG 15.0 mmHg, BALDO 1.2 cm2.  g. Echo, 2020: S/p TAVR with 23 mm Garcia Antoni 3 pericardial prosthesis. Mean PG 16 mmHg.   2. History of hiatal hernia, s/p repair 2016  3. Arthritis  4. Osteoporosis      No Known Allergies    Current Medications:    Current Outpatient Medications:   •  calcium carbonate (OS-SHAINA) 600 MG tablet, Take 600 mg by mouth Daily., Disp: , Rfl:   •  fexofenadine (ALLEGRA) 180 MG tablet, Take 180 mg by mouth Daily., Disp: , Rfl:   •  warfarin (COUMADIN) 2 MG tablet, Take 1 tablet by mouth Daily. For blood thinner (Patient taking differently: Take 8.5 mg by mouth Daily. For blood thinner), Disp: 30 tablet, Rfl: 3    HPI    Albania Hernandez is a 69 y.o. female who presents today for follow up of aortic valve disease s/p TAVR and chronic diastolic heart failure. Since last visit, she has been feeling well overall from a cardiovascular standpoint. Her only complaint is of some occasional dizziness. She does not routinely monitor her blood pressure at home, but does believe it is  "higher in the office today because she is nervous. Patient denies chest pain, shortness of breath, palpitations, edema, and syncope.         The following portions of the patient's history were reviewed and updated as appropriate: allergies, current medications and problem list.    Pertinent positives as listed in the HPI.  All other systems reviewed are negative.         Vitals:    05/06/20 1129 05/06/20 1148   BP: 154/80 160/82   BP Location: Right arm Right arm   Patient Position: Sitting Sitting   Pulse: 81    SpO2: 95%    Weight: 61.2 kg (135 lb)    Height: 167.6 cm (66\")        Physical Exam:  General: Alert and oriented.  Neck: Jugular venous pressure is within normal limits. Carotids have normal upstrokes without bruits.   Cardiovascular: Heart has a nondisplaced focal PMI. Regular rate and rhythm. 1-2/6 SE murmur mid sternal border, no gallop or rub.  Lungs: Clear, no rales or wheezes. Equal expansion is noted.   Extremities: Show no edema.  Skin: Warm and dry.  Neurologic: Nonfocal.     Diagnostic Data (reviewed with patient):  Lab Results   Component Value Date    GLUCOSE 147 (H) 12/17/2019    BUN 11 12/17/2019    CREATININE 0.62 12/17/2019    EGFRIFNONA 95 12/17/2019    BCR 17.7 12/17/2019     12/17/2019    K 3.7 12/17/2019     (H) 12/17/2019    CO2 22.0 12/17/2019    CALCIUM 8.9 12/17/2019    ALBUMIN 4.40 12/15/2019    ALKPHOS 60 12/15/2019    AST 14 12/15/2019    ALT 13 12/15/2019      Lab Results   Component Value Date    WBC 9.77 12/17/2019    RBC 3.91 12/17/2019    HGB 11.3 (L) 12/17/2019    HCT 35.7 12/17/2019    MCV 91.3 12/17/2019     12/17/2019       Procedures      Assessment:    ICD-10-CM ICD-9-CM   1. Severe aortic stenosis I35.0 424.1   2. S/P TAVR (transcatheter aortic valve replacement) Z95.2 V43.3     Reviewed today's limited echocardiogram with the patient. Her aortic valve mean pressure gradient was 16 mmHg today, compared to 15.0 mmHg on 01/22/2020.  I suspect that " the gradient obtained post procedure in the operating room is artificially lower than it should be as it was only 9 mmHg and her valve size is a 23 mm valve.  Since her gradient has not changed I think it is reasonable to discontinue her warfarin.  She will continue her aspirin.  Will repeat a limited echocardiogram in 3 months to make sure this mean gradient does not significantly increase.       Plan:  1. Repeat limited echocardiogram in 3 months to reassess post-TAVR aortic valve mean pressure gradient.  2. Monitor blood pressure at home and call the office if readings remain > 140 mmHg systolic.  3. DC warfarin.   4. Continue all other current medications.  5. F/up in 3 months, sooner if needed.      Scribed for Veronica Rodney MD by Radha Araujo. 5/6/2020  11:51     I Veronica Rodney MD personally performed the services described in this documentation as scribed by the above individual in my presence, and it is both accurate and complete.    Veronica Rodney MD, FACC

## 2020-05-06 ENCOUNTER — HOSPITAL ENCOUNTER (OUTPATIENT)
Dept: CARDIOLOGY | Facility: HOSPITAL | Age: 70
Discharge: HOME | End: 2020-05-06
Admitting: INTERNAL MEDICINE

## 2020-05-06 ENCOUNTER — OFFICE VISIT (OUTPATIENT)
Dept: CARDIOLOGY | Facility: CLINIC | Age: 70
End: 2020-05-06

## 2020-05-06 VITALS — HEIGHT: 66 IN | BODY MASS INDEX: 21.21 KG/M2 | WEIGHT: 132 LBS

## 2020-05-06 VITALS
WEIGHT: 135 LBS | BODY MASS INDEX: 21.69 KG/M2 | DIASTOLIC BLOOD PRESSURE: 82 MMHG | OXYGEN SATURATION: 95 % | HEIGHT: 66 IN | HEART RATE: 81 BPM | SYSTOLIC BLOOD PRESSURE: 160 MMHG

## 2020-05-06 DIAGNOSIS — I35.0 SEVERE AORTIC STENOSIS: ICD-10-CM

## 2020-05-06 DIAGNOSIS — Z95.2 S/P TAVR (TRANSCATHETER AORTIC VALVE REPLACEMENT): ICD-10-CM

## 2020-05-06 DIAGNOSIS — I35.0 SEVERE AORTIC STENOSIS: Primary | ICD-10-CM

## 2020-05-06 LAB
AORTIC DIMENSIONLESS INDEX: 0.4 (DI)
BH CV ECHO MEAS - AO MAX PG (FULL): 25.9 MMHG
BH CV ECHO MEAS - AO MAX PG: 30 MMHG
BH CV ECHO MEAS - AO MEAN PG (FULL): 13.8 MMHG
BH CV ECHO MEAS - AO MEAN PG: 15.9 MMHG
BH CV ECHO MEAS - AO ROOT AREA (BSA CORRECTED): 1.6
BH CV ECHO MEAS - AO ROOT AREA: 5.7 CM^2
BH CV ECHO MEAS - AO ROOT DIAM: 2.7 CM
BH CV ECHO MEAS - AO V2 MAX: 272.5 CM/SEC
BH CV ECHO MEAS - AO V2 MEAN: 183.9 CM/SEC
BH CV ECHO MEAS - AO V2 VTI: 59.7 CM
BH CV ECHO MEAS - ASC AORTA: 3.7 CM
BH CV ECHO MEAS - AVA(I,A): 0.92 CM^2
BH CV ECHO MEAS - AVA(I,D): 1.3 CM^2
BH CV ECHO MEAS - AVA(V,A): 0.91 CM^2
BH CV ECHO MEAS - AVA(V,D): 0.91 CM^2
BH CV ECHO MEAS - BSA(HAYCOCK): 1.7 M^2
BH CV ECHO MEAS - BSA: 1.7 M^2
BH CV ECHO MEAS - BZI_BMI: 21.3 KILOGRAMS/M^2
BH CV ECHO MEAS - BZI_METRIC_HEIGHT: 167.6 CM
BH CV ECHO MEAS - BZI_METRIC_WEIGHT: 59.9 KG
BH CV ECHO MEAS - EDV(CUBED): 54.9 ML
BH CV ECHO MEAS - EDV(MOD-SP2): 56 ML
BH CV ECHO MEAS - EDV(MOD-SP4): 65 ML
BH CV ECHO MEAS - EDV(TEICH): 61.9 ML
BH CV ECHO MEAS - EF(CUBED): 61.8 %
BH CV ECHO MEAS - EF(MOD-BP): 55 %
BH CV ECHO MEAS - EF(MOD-SP2): 58.9 %
BH CV ECHO MEAS - EF(MOD-SP4): 52.3 %
BH CV ECHO MEAS - EF(TEICH): 54 %
BH CV ECHO MEAS - ESV(CUBED): 21 ML
BH CV ECHO MEAS - ESV(MOD-SP2): 23 ML
BH CV ECHO MEAS - ESV(MOD-SP4): 31 ML
BH CV ECHO MEAS - ESV(TEICH): 28.5 ML
BH CV ECHO MEAS - FS: 27.4 %
BH CV ECHO MEAS - IVS/LVPW: 1.2
BH CV ECHO MEAS - IVSD: 1 CM
BH CV ECHO MEAS - LA DIMENSION: 2.9 CM
BH CV ECHO MEAS - LA/AO: 1.1
BH CV ECHO MEAS - LAD MAJOR: 4.4 CM
BH CV ECHO MEAS - LV DIASTOLIC VOL/BSA (35-75): 38.8 ML/M^2
BH CV ECHO MEAS - LV MASS(C)D: 119.9 GRAMS
BH CV ECHO MEAS - LV MASS(C)DI: 71.5 GRAMS/M^2
BH CV ECHO MEAS - LV MAX PG: 4.1 MMHG
BH CV ECHO MEAS - LV MEAN PG: 2.2 MMHG
BH CV ECHO MEAS - LV SYSTOLIC VOL/BSA (12-30): 18.5 ML/M^2
BH CV ECHO MEAS - LV V1 MAX: 101.8 CM/SEC
BH CV ECHO MEAS - LV V1 MEAN: 69.4 CM/SEC
BH CV ECHO MEAS - LV V1 VTI: 24.2 CM
BH CV ECHO MEAS - LVIDD: 3.8 CM
BH CV ECHO MEAS - LVIDS: 2.8 CM
BH CV ECHO MEAS - LVLD AP2: 7 CM
BH CV ECHO MEAS - LVLD AP4: 7.5 CM
BH CV ECHO MEAS - LVLS AP2: 6.4 CM
BH CV ECHO MEAS - LVLS AP4: 5.8 CM
BH CV ECHO MEAS - LVOT AREA (M): 2.5 CM^2
BH CV ECHO MEAS - LVOT AREA: 2.5 CM^2
BH CV ECHO MEAS - LVOT DIAM: 1.8 CM
BH CV ECHO MEAS - LVPWD: 0.93 CM
BH CV ECHO MEAS - SI(AO): 201.7 ML/M^2
BH CV ECHO MEAS - SI(CUBED): 20.2 ML/M^2
BH CV ECHO MEAS - SI(LVOT): 36.6 ML/M^2
BH CV ECHO MEAS - SI(MOD-SP2): 19.7 ML/M^2
BH CV ECHO MEAS - SI(MOD-SP4): 20.3 ML/M^2
BH CV ECHO MEAS - SI(TEICH): 20 ML/M^2
BH CV ECHO MEAS - SV(AO): 338.1 ML
BH CV ECHO MEAS - SV(CUBED): 33.9 ML
BH CV ECHO MEAS - SV(LVOT): 61.4 ML
BH CV ECHO MEAS - SV(MOD-SP2): 33 ML
BH CV ECHO MEAS - SV(MOD-SP4): 34 ML
BH CV ECHO MEAS - SV(TEICH): 33.5 ML
BH CV VAS BP LEFT ARM: NORMAL MMHG
BH CV XLRA - RV BASE: 3.4 CM
BH CV XLRA - RV LENGTH: 6.3 CM
BH CV XLRA - RV MID: 3.1 CM
LEFT ATRIUM VOLUME INDEX: 28 ML/M^2
LEFT ATRIUM VOLUME: 47 ML

## 2020-05-06 PROCEDURE — 93308 TTE F-UP OR LMTD: CPT

## 2020-05-06 PROCEDURE — 93325 DOPPLER ECHO COLOR FLOW MAPG: CPT

## 2020-05-06 PROCEDURE — 99213 OFFICE O/P EST LOW 20 MIN: CPT | Performed by: INTERNAL MEDICINE

## 2020-05-06 PROCEDURE — 93321 DOPPLER ECHO F-UP/LMTD STD: CPT

## 2020-05-06 PROCEDURE — 93306 TTE W/DOPPLER COMPLETE: CPT | Performed by: INTERNAL MEDICINE

## 2020-09-15 DIAGNOSIS — Z95.2 S/P TAVR (TRANSCATHETER AORTIC VALVE REPLACEMENT): Primary | ICD-10-CM

## 2020-10-28 ENCOUNTER — HOSPITAL ENCOUNTER (OUTPATIENT)
Dept: CARDIOLOGY | Facility: HOSPITAL | Age: 70
Discharge: HOME | End: 2020-10-28
Admitting: INTERNAL MEDICINE

## 2020-10-28 ENCOUNTER — OFFICE VISIT (OUTPATIENT)
Dept: CARDIOLOGY | Facility: CLINIC | Age: 70
End: 2020-10-28

## 2020-10-28 ENCOUNTER — ANTICOAGULATION VISIT (OUTPATIENT)
Dept: PHARMACY | Facility: HOSPITAL | Age: 70
End: 2020-10-28

## 2020-10-28 VITALS — BODY MASS INDEX: 21.69 KG/M2 | HEIGHT: 66 IN | WEIGHT: 135 LBS

## 2020-10-28 VITALS
SYSTOLIC BLOOD PRESSURE: 134 MMHG | TEMPERATURE: 96.8 F | DIASTOLIC BLOOD PRESSURE: 70 MMHG | HEIGHT: 66 IN | WEIGHT: 136 LBS | HEART RATE: 76 BPM | BODY MASS INDEX: 21.86 KG/M2

## 2020-10-28 DIAGNOSIS — T82.09XD PROSTHETIC VALVE DYSFUNCTION, SUBSEQUENT ENCOUNTER: ICD-10-CM

## 2020-10-28 DIAGNOSIS — Z95.2 S/P TAVR (TRANSCATHETER AORTIC VALVE REPLACEMENT): ICD-10-CM

## 2020-10-28 DIAGNOSIS — I35.0 SEVERE AORTIC STENOSIS: Primary | ICD-10-CM

## 2020-10-28 LAB
AORTIC DIMENSIONLESS INDEX: 0.4 (DI)
BH CV ECHO MEAS - AO MAX PG (FULL): 38.5 MMHG
BH CV ECHO MEAS - AO MAX PG: 46 MMHG
BH CV ECHO MEAS - AO MEAN PG (FULL): 20.9 MMHG
BH CV ECHO MEAS - AO MEAN PG: 25 MMHG
BH CV ECHO MEAS - AO ROOT AREA (BSA CORRECTED): 2
BH CV ECHO MEAS - AO ROOT AREA: 8.8 CM^2
BH CV ECHO MEAS - AO ROOT DIAM: 3.3 CM
BH CV ECHO MEAS - AO V2 MAX: 341 CM/SEC
BH CV ECHO MEAS - AO V2 MEAN: 231.2 CM/SEC
BH CV ECHO MEAS - AO V2 VTI: 80.4 CM
BH CV ECHO MEAS - ASC AORTA: 3.9 CM
BH CV ECHO MEAS - AVA(I,A): 1 CM^2
BH CV ECHO MEAS - AVA(I,D): 1 CM^2
BH CV ECHO MEAS - AVA(V,A): 1 CM^2
BH CV ECHO MEAS - AVA(V,D): 1 CM^2
BH CV ECHO MEAS - BSA(HAYCOCK): 1.7 M^2
BH CV ECHO MEAS - BSA: 1.7 M^2
BH CV ECHO MEAS - BZI_BMI: 21.8 KILOGRAMS/M^2
BH CV ECHO MEAS - BZI_METRIC_HEIGHT: 167.6 CM
BH CV ECHO MEAS - BZI_METRIC_WEIGHT: 61.2 KG
BH CV ECHO MEAS - EDV(CUBED): 57.9 ML
BH CV ECHO MEAS - EDV(MOD-SP2): 121 ML
BH CV ECHO MEAS - EDV(MOD-SP4): 113 ML
BH CV ECHO MEAS - EDV(TEICH): 64.6 ML
BH CV ECHO MEAS - EF(CUBED): 83.2 %
BH CV ECHO MEAS - EF(MOD-BP): 66 %
BH CV ECHO MEAS - EF(MOD-SP2): 70.2 %
BH CV ECHO MEAS - EF(MOD-SP4): 61.9 %
BH CV ECHO MEAS - EF(TEICH): 76.8 %
BH CV ECHO MEAS - ESV(CUBED): 9.7 ML
BH CV ECHO MEAS - ESV(MOD-SP2): 36 ML
BH CV ECHO MEAS - ESV(MOD-SP4): 43 ML
BH CV ECHO MEAS - ESV(TEICH): 15 ML
BH CV ECHO MEAS - FS: 44.8 %
BH CV ECHO MEAS - IVS/LVPW: 1.2
BH CV ECHO MEAS - IVSD: 0.94 CM
BH CV ECHO MEAS - LV DIASTOLIC VOL/BSA (35-75): 66.8 ML/M^2
BH CV ECHO MEAS - LV MASS(C)D: 99.9 GRAMS
BH CV ECHO MEAS - LV MASS(C)DI: 59 GRAMS/M^2
BH CV ECHO MEAS - LV MAX PG: 7.4 MMHG
BH CV ECHO MEAS - LV MEAN PG: 4.1 MMHG
BH CV ECHO MEAS - LV SYSTOLIC VOL/BSA (12-30): 25.4 ML/M^2
BH CV ECHO MEAS - LV V1 MAX: 136.3 CM/SEC
BH CV ECHO MEAS - LV V1 MEAN: 95.1 CM/SEC
BH CV ECHO MEAS - LV V1 VTI: 32.7 CM
BH CV ECHO MEAS - LVIDD: 3.9 CM
BH CV ECHO MEAS - LVIDS: 2.1 CM
BH CV ECHO MEAS - LVLD AP2: 8.6 CM
BH CV ECHO MEAS - LVLD AP4: 8.3 CM
BH CV ECHO MEAS - LVLS AP2: 6.8 CM
BH CV ECHO MEAS - LVLS AP4: 6.5 CM
BH CV ECHO MEAS - LVOT AREA (M): 2.5 CM^2
BH CV ECHO MEAS - LVOT AREA: 2.5 CM^2
BH CV ECHO MEAS - LVOT DIAM: 1.8 CM
BH CV ECHO MEAS - LVPWD: 0.8 CM
BH CV ECHO MEAS - PA MAX PG: 5.6 MMHG
BH CV ECHO MEAS - PA V2 MAX: 118.4 CM/SEC
BH CV ECHO MEAS - PI END-D VEL: 69.5 CM/SEC
BH CV ECHO MEAS - SI(AO): 418 ML/M^2
BH CV ECHO MEAS - SI(CUBED): 28.5 ML/M^2
BH CV ECHO MEAS - SI(LVOT): 48.4 ML/M^2
BH CV ECHO MEAS - SI(MOD-SP2): 50.2 ML/M^2
BH CV ECHO MEAS - SI(MOD-SP4): 41.4 ML/M^2
BH CV ECHO MEAS - SI(TEICH): 29.3 ML/M^2
BH CV ECHO MEAS - SV(AO): 707.5 ML
BH CV ECHO MEAS - SV(CUBED): 48.2 ML
BH CV ECHO MEAS - SV(LVOT): 81.9 ML
BH CV ECHO MEAS - SV(MOD-SP2): 85 ML
BH CV ECHO MEAS - SV(MOD-SP4): 70 ML
BH CV ECHO MEAS - SV(TEICH): 49.6 ML
BH CV ECHO MEAS - TR MAX PG: 20 MMHG
BH CV ECHO MEAS - TR MAX VEL: 220.2 CM/SEC
BH CV VAS BP LEFT ARM: NORMAL MMHG
INR PPP: 0.9 (ref 0.91–1.09)
LV EF 2D ECHO EST: 55 %
PROTHROMBIN TIME: 11 SECONDS (ref 10–13.8)

## 2020-10-28 PROCEDURE — 85610 PROTHROMBIN TIME: CPT

## 2020-10-28 PROCEDURE — 99214 OFFICE O/P EST MOD 30 MIN: CPT | Performed by: INTERNAL MEDICINE

## 2020-10-28 PROCEDURE — 93325 DOPPLER ECHO COLOR FLOW MAPG: CPT

## 2020-10-28 PROCEDURE — 93325 DOPPLER ECHO COLOR FLOW MAPG: CPT | Performed by: INTERNAL MEDICINE

## 2020-10-28 PROCEDURE — G0463 HOSPITAL OUTPT CLINIC VISIT: HCPCS

## 2020-10-28 PROCEDURE — 93321 DOPPLER ECHO F-UP/LMTD STD: CPT

## 2020-10-28 PROCEDURE — 93308 TTE F-UP OR LMTD: CPT | Performed by: INTERNAL MEDICINE

## 2020-10-28 PROCEDURE — 93308 TTE F-UP OR LMTD: CPT

## 2020-10-28 PROCEDURE — 36416 COLLJ CAPILLARY BLOOD SPEC: CPT

## 2020-10-28 PROCEDURE — 93321 DOPPLER ECHO F-UP/LMTD STD: CPT | Performed by: INTERNAL MEDICINE

## 2020-10-28 RX ORDER — WARFARIN SODIUM 5 MG/1
TABLET ORAL
Qty: 60 TABLET | Refills: 0 | Status: SHIPPED | OUTPATIENT
Start: 2020-10-28 | End: 2020-12-22 | Stop reason: SDUPTHER

## 2020-10-28 RX ORDER — ASPIRIN 81 MG/1
81 TABLET ORAL DAILY
COMMUNITY
Start: 2020-05-01

## 2020-10-28 NOTE — PROGRESS NOTES
[facility] Cardiology    Patient ID: Albania Hernandez is a 70 y.o. female.  : 1950   Contact: [phone]    Encounter date: 10/28/2020    PCP: Kimberly Anglin FNP      Chief complaint:   Chief Complaint   Patient presents with   • severe aortic stenosis       Problem List:  1. Aortic valve disease:  a. LHC, 2019, [facility]: Nonobstructive CAD with 20-30% mid LAD lesion   b. Echo, 10/31/2019: Calcified AV, severe AS with BALDO 0.62cm2, mean gradient 70mmHg, max gradient 120mmHg, trace AI. Mild LVH, EF 55-60%, mild MR.   c. Class II VELAZQUEZ  d. SAW, 2019: Severe AS, mean gradient 71.2 mmHg, BALDO 0.5 cm2.  e. S/p TAVR, 2019, with 23 mm Garcia Antoni 3 pericardial prosthesis. Intra-op TAVR SAW showed mean PG 9 mmHg with BALDO 1.9 cm2.  f. Echo, 2020: EF 60%. S/p TAVR with 23 mm Garcia Antoni 3 pericardial prosthesis. Mean PG 15.0 mmHg, BALDO 1.2 cm2.  g. Echo, 2020: S/p TAVR with 23 mm Garcia Antoni 3 pericardial prosthesis. Mean PG 16 mmHg.   h. Echo, 10/28/2020: EF: 55%, There is a 23 mm Antoni 3 TAVR present. Peak velocity of the flow distal to the aortic valve is 341 cm/s. Aortic valve maximum pressure gradient is 46 mmHg. Aortic valve mean pressure gradient is 25 mmHg. Trace MR.   2. History of hiatal hernia, s/p repair 2016  3. Arthritis  4. Osteoporosis      No Known Allergies    Current Medications:    Current Outpatient Medications:   •  ASPIRIN 81 PO, Take 81 mg by mouth Daily., Disp: , Rfl:   •  calcium carbonate (OS-SHAINA) 600 MG tablet, Take 600 mg by mouth Daily., Disp: , Rfl:   •  fexofenadine (ALLEGRA) 180 MG tablet, Take 180 mg by mouth Daily., Disp: , Rfl:     HPI    Albania Hernandez is a 70 y.o. female who presents today for a follow up of severe aortic stenosis s/p TAVR. Since last visit, she has been feeling well from a cardiovascular standpoint. She notes that she does not follow an exercise routine but stays active working and taking  "care of her mother who has Alzheimer's. She presents with ankle swelling but notes that this was due to an injury. Patient otherwise denies chest pain, shortness of breath, PND, edema, palpitations, syncope, or presyncope at this time.        The following portions of the patient's history were reviewed and updated as appropriate: allergies, current medications and problem list.    Pertinent positives as listed in the HPI.  All other systems reviewed are negative.         Vitals:    10/28/20 1429   BP: 134/70   BP Location: Left arm   Patient Position: Sitting   Pulse: 76   Temp: 96.8 °F (36 °C)   Weight: 61.7 kg (136 lb)   Height: 167.6 cm (66\")       Physical Exam:  General: Alert and oriented.  Neck: Jugular venous pressure is within normal limits. Carotids have normal upstrokes without bruits.   Cardiovascular: Heart has a nondisplaced focal PMI. Regular rate and rhythm.  2/6 SE murmur, no gallop or rub.  Lungs: Clear, no rales or wheezes. Equal expansion is noted.   Extremities: Show no edema.  Skin: Warm and dry.  Neurologic: Nonfocal.     Diagnostic Data (reviewed with patient):  Lab Results   Component Value Date    GLUCOSE 147 (H) 12/17/2019    BUN 11 12/17/2019    CREATININE 0.62 12/17/2019    EGFRIFNONA 95 12/17/2019    BCR 17.7 12/17/2019     12/17/2019    K 3.7 12/17/2019     (H) 12/17/2019    CO2 22.0 12/17/2019    CALCIUM 8.9 12/17/2019    ALBUMIN 4.40 12/15/2019    ALKPHOS 60 12/15/2019    AST 14 12/15/2019    ALT 13 12/15/2019     Lab Results   Component Value Date    WBC 9.77 12/17/2019    RBC 3.91 12/17/2019    HGB 11.3 (L) 12/17/2019    HCT 35.7 12/17/2019    MCV 91.3 12/17/2019     12/17/2019        Procedures      Assessment:    ICD-10-CM ICD-9-CM   1. Severe aortic stenosis  I35.0 424.1   2. S/P TAVR (transcatheter aortic valve replacement)  Z95.2 V43.3         Plan:  1. Send Echos to Dr. Camilo Mayer   2. Begin routine aerobic exercise for at least 30 minutes 5 days per " week.   3. Begin warfarin therapy for prosthetic valve dysfunction.  Gradient previously 15 mmHg, now 25 mmHg.  4. Continue all other current medications.  5. F/up in 3 months with limited echo for EF and aortic valve gradient, sooner if needed.    Scribed for Veronica Rodney MD by Charlie Cox. 10/28/2020  14:45 EDT       I Veronica Rodney MD personally performed the services described in this documentation as scribed by the above individual in my presence, and it is both accurate and complete.    Veronica Rodney MD, FACC

## 2020-10-28 NOTE — PROGRESS NOTES
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    Anticoagulation Clinic INR History:  Date 10/28              Total WeeklyDose 0mg- baseline              INR 0.9              Notes 1st clinic                Clinic Interview:  Tablet Strength: 1mg, 2mg, and 5mg  Estimated OOP cost: ~$5  .Verbal Release Authorization signed on 10/28/2020-- may speak with Norm White (sons) and Lashanda Matthew (sister)  Patient contact: ok to [facility] day time [phone]; afternoon: [phone]  Lab contact: Jean-Paul PETER    Patient Findings  Negatives:  Signs/symptoms of thrombosis, Signs/symptoms of bleeding, Laboratory test error suspected, Change in health, Change in alcohol use, Change in activity, Upcoming invasive procedure, Emergency department visit, Upcoming dental procedure, Missed doses, Extra doses, Change in medications, Change in diet/appetite, Hospital admission, Bruising, Other complaints   Comments:  Patient has brief experience with taking warfarin. She has 1mg 2mg and 5mg at home. Perhaps other strengths as well. Was previously managed by APRN. She doesn't remember dosing definitively, however, she possibly took 8mg to 9mg /day between March and May of 2020.     Ms. Hernandez is a new referral to [facility] anticoagulation clinic following TAVR and d/c of warfarin in may 2020. Per Dr. Rodney noteBegin warfarin therapy for prosthetic valve dysfunction.  Gradient previously 15 mmHg, now 25 mmHg.     Welcomed Albania Hernandez to the [facility] Anticoagulation Clinic. I introduced  myself and discussed that we will assist with her warfarin monitoring and work with her cardiologist or other physicians to provide patient care. Encouraged patient to call the clinic with requests for warfarin refills, changes in medications / diet, change in health, or upcoming procedures / surgeries. Discussed signs and symptoms of bleeding, signs and symptoms of TIA / CVA, use of OTC pain medications, and alcohol / tobacco / dietary / other drug interactions in relation to warfarin. Explained that she will be testing her INR more frequently in these first few weeks of therapy as we try to adjust her dose and achieve a therapeutic INR x 2 consecutive readings. Once that is achieved, pt will follow up at remote lab every 4 weeks, on average. Furthermore, we discussed available dates to come to the [facility] for face to face meetings. Patient was agreeable to meeting twice per year. At this time, Albania SEE Hernandez did not have further questions or concerns. Provided patient with the clinic's contact information for future reference.     Plan:  1. INR is SUBtherapeutic today at 0.9. Patient has yet to start taking warfarin. Instructed pt to begin 5mg daily as we begin to dose find. Of note: patient was on warfarin previously. She reports that he used to possibly be on 8mg to 9mg daily, however, she is not confident in this dosing and reports she had to go to the clinic weekly. Therefore, will begin with 5mg daily to determine her response.  2. Repeat INR at [facility] on 11/2 in the morning. SHe will call the clinic if she hasn't heard from us by 1300.   3. Medications reviewed. iVent placed.   4. Pt warfarin refills sent to her pharmacy. She will  tomorrow. Confirmed with pharmacy that they have received it.   5. Verbal and written information provided in the clinic. Albania Hernandez expresses understanding by teach back and has no further questions at this time.  6. At 3 months, 1/28/2020, will  determine if patient will be on warfarin long term and if she would like to apply for a home monitor.    Essence Tee, PharmD   10/28/2020  16:25 EDT

## 2020-11-03 ENCOUNTER — TELEPHONE (OUTPATIENT)
Dept: PHARMACY | Facility: HOSPITAL | Age: 70
End: 2020-11-03

## 2020-11-03 NOTE — TELEPHONE ENCOUNTER
Have discussed plan with patient as approved by Dr. Rodney. Patient has RBV dosing instructions and has no further questions at this time. She is agreeable to have INR draw as early as possible at [facility] and will call clinic if she has questions or issues

## 2020-11-03 NOTE — TELEPHONE ENCOUNTER
Patient called this morning to report that she did not have INR draw yesterday as she is currently quarantined re: COVID-19. She reports on Fri, 10/30, she was tested and results were received over the weekend.     Myself and Essence Tee, PharmD, interacted with patient while she was in the clinic. Patient was sent directly to clinic from appt w/Dr. Ronel MD, so we were not able to screen patient 24 hours prior to appt as per protocol. Have contacted Rima Hines RN, to discuss. She is going to have Donte Alexandra RN, contact us with more information.    Patient reports she will be able to leave quarantine on 11/7/20

## 2020-11-03 NOTE — TELEPHONE ENCOUNTER
Dr. Rodney and Rima,    Ms. Hernandez is in quarantine until 11/7/2020 due to COVID positive test. Given she resumed warfarin last week and is unable to have repeat INR this week, would you be agreeable to holding warfarin today and tomorrow and resuming warfarin on Thursday to obtain new INR result on Monday? I am hoping to avoid SUPRAtherapeutic INR results given we are uncertain of the dose of warfarin she needs weekly.    Thank you,    Essence Tee   [facility] Anticoagulation clinic

## 2020-11-09 ENCOUNTER — ANTICOAGULATION VISIT (OUTPATIENT)
Dept: PHARMACY | Facility: HOSPITAL | Age: 70
End: 2020-11-09

## 2020-11-09 DIAGNOSIS — Z95.2 S/P TAVR (TRANSCATHETER AORTIC VALVE REPLACEMENT): ICD-10-CM

## 2020-11-09 DIAGNOSIS — T82.09XD PROSTHETIC VALVE DYSFUNCTION, SUBSEQUENT ENCOUNTER: ICD-10-CM

## 2020-11-09 DIAGNOSIS — Z95.2 S/P TAVR (TRANSCATHETER AORTIC VALVE REPLACEMENT): Primary | ICD-10-CM

## 2020-11-09 LAB — INR PPP: 1.5

## 2020-11-09 NOTE — PROGRESS NOTES
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    Anticoagulation Clinic INR History:  Date 10/28 11/9             Total WeeklyDose 0mg- baseline 25mg             INR 0.9 1.5             Notes 1st clinic COVID hold x2; Dec appetite               Clinic Interview:  Tablet Strength: 1mg, 2mg, and 5mg  Estimated OOP cost: ~$5  .Verbal Release Authorization signed on 10/28/2020-- may speak with Norm White (sons) and Lashanda Matthew (sister)  Patient contact: ok to [facility] day time [phone]; afternoon: [phone]  Lab contact: [facility]    Patient Findings  Positives:  Change in health, Change in diet/appetite   Negatives:  Signs/symptoms of thrombosis, Signs/symptoms of bleeding, Laboratory test error suspected, Change in alcohol use, Change in activity, Upcoming invasive procedure, Emergency department visit, Upcoming dental procedure, Missed doses, Extra doses, Change in medications, Hospital admission, Bruising, Other complaints   Comments:  Ms. Hernandez is getting better from COVID, however, feels alittle weak. She has had nausea from the virus, however, no vomiting. She has had a decrease in appetite.     Plan:  1. INR is SUBtherapeutic today at 1.5.  Instructed pt to boost today's dose to 7.5mg then continue 5mg daily. Of note: patient was on warfarin previously. She reports that he used to possibly be on 8.5mg qd per Dr Rodney  2. Repeat INR at [facility] Thursday 11/12/2020  3. Verbal information provided over the phone. Albania Hernandez expresses  understanding by teach back and has no further questions at this time.  4. At 3 months, 1/28/2020, will determine if patient will be on warfarin long term and if she would like to apply for a home monitor.    Essence Tee, PharmD  11/09/2020  15:01 EST

## 2020-11-12 ENCOUNTER — ANTICOAGULATION VISIT (OUTPATIENT)
Dept: PHARMACY | Facility: HOSPITAL | Age: 70
End: 2020-11-12

## 2020-11-12 DIAGNOSIS — Z95.2 S/P TAVR (TRANSCATHETER AORTIC VALVE REPLACEMENT): ICD-10-CM

## 2020-11-12 DIAGNOSIS — T82.09XD PROSTHETIC VALVE DYSFUNCTION, SUBSEQUENT ENCOUNTER: ICD-10-CM

## 2020-11-12 LAB — INR PPP: 2.18

## 2020-11-12 NOTE — PROGRESS NOTES
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    Anticoagulation Clinic INR History:  Date 10/28 11/9 11/12            Total WeeklyDose 0mg- baseline 25mg 37.5 mg            INR 0.9 1.5 2.18            Notes 1st clinic COVID hold x2; Dec appetite Extra ASA; sick; no GLV; lactulose x3 days              Clinic Interview:  Tablet Strength: 1mg, 2mg, and 5mg  Estimated OOP cost: ~$5  .Verbal Release Authorization signed on 10/28/2020-- may speak with Norm White (sons) and Lashanda Matthew (sister)  Patient contact: ok to [facility] day time [phone]; afternoon: [phone]  Lab contact: Jean-Paul PETER    Patient Findings:  Positives:  Change in health, Emergency department visit, Change in medications, Change in diet/appetite   Negatives:  Signs/symptoms of thrombosis, Signs/symptoms of bleeding, Laboratory test error suspected, Change in alcohol use, Change in activity, Upcoming invasive procedure, Upcoming dental procedure, Missed doses, Extra doses, Hospital admission, Bruising, Other complaints   Comments:  Comments: Ms. Hernandez went to the ER on Monday evening for pain under her left breast. They gave her four ASA (likely 81 mg but patient unsure) for chest pain. They dx her with a pulled muscle. She also reports that during quarantine she had not had a bowel movement for almost a week and a half. Starting Sunday she took Miralax plus lactulose to help x 3 days. She finally had a bowel movement yesterday and this morning and plans to discontinue. There is a DDI noted  between warfarin and lactulose in [employer]romed[employer]. She says she is feeling much better now that her bowel movements have improved and although she has not had any GLV since her COVID dx she plans on eating a salad tonight. Appetite is improved but not quite back to baseline yet.     Concurrent use of LACTULOSE and WARFARIN may result in elevated International Normalized Ratio serum values with potentiation of anticoagulation effects.      Plan:  1. INR is WNL today following dose increase. Spoke with Essence Tee, PharmD, and instructed Ms. Hernandez to take warfarin 2.5 mg on ThursSat and 5 mg on FriSun until recheck.  2. Repeat INR on Monday.  3. Verbal information provided over the phone. Albania Hernandez expresses understanding by teach back and has no further questions at this time.  4. At 3 months, 1/28/2020, will determine if patient will be on warfarin long term and if she would like to apply for a home monitor.    Samanta Ko, GIO  11/12/2020  13:21 EST    She also received potassium tablets in the ER as well. She called to let us know.   I, Essence Tee, PharmD, have reviewed the note in full and agree with the assessment and plan.  11/12/20  14:06 EST

## 2020-11-16 ENCOUNTER — ANTICOAGULATION VISIT (OUTPATIENT)
Dept: PHARMACY | Facility: HOSPITAL | Age: 70
End: 2020-11-16

## 2020-11-16 DIAGNOSIS — Z95.2 S/P TAVR (TRANSCATHETER AORTIC VALVE REPLACEMENT): ICD-10-CM

## 2020-11-16 DIAGNOSIS — T82.09XD PROSTHETIC VALVE DYSFUNCTION, SUBSEQUENT ENCOUNTER: ICD-10-CM

## 2020-11-16 LAB — INR PPP: 1.65

## 2020-11-16 NOTE — PROGRESS NOTES
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    Anticoagulation Clinic INR History:  Date 10/28 11/9 11/12 11/16           Total WeeklyDose 0mg- baseline 25mg 37.5 mg 32.5mg           INR 0.9 1.5 2.18 1.65           Notes 1st clinic COVID hold x2; Dec appetite Extra ASA; sick; no GLV; lactulose x3 days  Boost drinks            Clinic Interview:  Tablet Strength: 1mg, 2mg, and 5mg  Estimated OOP cost: ~$5  .Verbal Release Authorization signed on 10/28/2020-- may speak with Norm White (sons) and Lashanda Matthew (sister)  Patient contact: ok to [facility] day time [phone]; afternoon: [phone]  Lab contact: Jean-Paul PETER    Patient Findings:    Negatives:  Signs/symptoms of thrombosis, Signs/symptoms of bleeding, Laboratory test error suspected, Change in health, Change in alcohol use, Change in activity, Upcoming invasive procedure, Emergency department visit, Upcoming dental procedure, Missed doses, Extra doses, Change in medications, Change in diet/appetite, Hospital admission, Bruising, Other complaints   Comments:  Reports her appetite is back to baseline. She used to drink boost drinks daily prior to COVID, she bought again and had x2 days last week but then discontinued due to vit K content. She plans to resume drinking one daily, discussed consistency         Plan:  1. INR is SUBtherapeutic following decreased dose. Considering patient would like to resume daily boost drinks, instructed Ms. Hernandez to BOOST dose to 7.5mg tonight then take 5  mg daily until recheck.  2. Repeat INR on Thursday, will be getting blood work done to check potassium  3. Verbal information provided over the phone. Albania MAYI Hernandez expresses understanding by teach back and has no further questions at this time.  4. At 3 months, 1/28/2020, will determine if patient will be on warfarin long term and if she would like to apply for a home monitor.    Manuel HaywoodD.  11/16/20   13:32 EST

## 2020-11-19 ENCOUNTER — ANTICOAGULATION VISIT (OUTPATIENT)
Dept: PHARMACY | Facility: HOSPITAL | Age: 70
End: 2020-11-19

## 2020-11-19 DIAGNOSIS — Z95.2 S/P TAVR (TRANSCATHETER AORTIC VALVE REPLACEMENT): ICD-10-CM

## 2020-11-19 DIAGNOSIS — T82.09XD PROSTHETIC VALVE DYSFUNCTION, SUBSEQUENT ENCOUNTER: ICD-10-CM

## 2020-11-19 LAB — INR PPP: 1.6

## 2020-11-19 NOTE — PROGRESS NOTES
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    Anticoagulation Clinic INR History:  Date 10/28 11/9 11/12 11/16 11/19          Total WeeklyDose 0mg- baseline 25mg 37.5 mg 32.5mg 32.5mg          INR 0.9 1.5 2.18 1.65 1.6          Notes 1st clinic COVID hold x2; Dec appetite Extra ASA; sick; no GLV; lactulose x3 days  Boost drinks            Clinic Interview:  Tablet Strength: 1mg, 2mg, and 5mg  Estimated OOP cost: ~$5  .Verbal Release Authorization signed on 10/28/2020-- may speak with Norm White (sons) and Lashanda Matthew (sister)  Patient contact: ok to [facility] day time [phone]; afternoon: [phone]  Lab contact: Jean-Paul PETER    Patient Findings:      Negatives:  Signs/symptoms of thrombosis, Signs/symptoms of bleeding, Laboratory test error suspected, Change in health, Change in alcohol use, Change in activity, Upcoming invasive procedure, Emergency department visit, Upcoming dental procedure, Missed doses, Extra doses, Change in medications, Change in diet/appetite, Hospital admission, Bruising, Other complaints   Comments:  Continues boost drinks daily, plans to continue.            Plan:  1. INR remains SUBtherapeutic following increased dose. Considering patient would like to continue daily boost drinks, instructed Ms. Hernandez to BOOST dose to 7.5mg ThursSat then take 5 mg daily until recheck.  2. Repeat INR on Monday 11/23  3. Verbal information provided over the phone. Albania Hernandez expresses understanding by teach back and has no  further questions at this time.  4. At 3 months, 1/28/2020, will determine if patient will be on warfarin long term and if she would like to apply for a home monitor.    Manuel HaywoodD.  11/19/20   11:15 EST

## 2020-11-23 ENCOUNTER — TELEPHONE (OUTPATIENT)
Dept: PHARMACY | Facility: HOSPITAL | Age: 70
End: 2020-11-23

## 2020-11-23 NOTE — TELEPHONE ENCOUNTER
Patient called to report her INR was checked this AM at [facility], called PH: [phone] and LVM for them to fax us results ASAP. LVM for patient instructing her to take warfarin 7.5mg tonight if we do not receive results. Patient also mentioned she may be starting clindamycin

## 2020-11-24 ENCOUNTER — ANTICOAGULATION VISIT (OUTPATIENT)
Dept: PHARMACY | Facility: HOSPITAL | Age: 70
End: 2020-11-24

## 2020-11-24 DIAGNOSIS — Z95.2 S/P TAVR (TRANSCATHETER AORTIC VALVE REPLACEMENT): ICD-10-CM

## 2020-11-24 DIAGNOSIS — T82.09XD PROSTHETIC VALVE DYSFUNCTION, SUBSEQUENT ENCOUNTER: ICD-10-CM

## 2020-11-24 LAB — INR PPP: 2.27

## 2020-11-24 NOTE — PROGRESS NOTES
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    Anticoagulation Clinic INR History:  Date 10/28 11/9 11/12 11/16 11/19 11/23         Total WeeklyDose 0mg- baseline 25mg 37.5 mg 32.5mg 32.5mg 42.5 mg         INR 0.9 1.5 2.18 1.65 1.6 2.27         Notes 1st clinic COVID hold x2; Dec appetite Extra ASA; sick; no GLV; lactulose x3 days  Boost daily            Clinic Interview:  Tablet Strength: 1mg, 2mg, and 5mg  Estimated OOP cost: ~$5  .Verbal Release Authorization signed on 10/28/2020-- may speak with Norm White (sons) and Lashanda Jonicharlee (sister)  Patient contact: ok to [facility] day time [phone]; afternoon: [phone]  Lab contact: Jean-Paul PETER    Patient Findings  Positives:  Change in health, Change in medications   Negatives:  Signs/symptoms of thrombosis, Signs/symptoms of bleeding, Laboratory test error suspected, Change in alcohol use, Change in activity, Upcoming invasive procedure, Emergency department visit, Upcoming dental procedure, Missed doses, Extra doses, Change in diet/appetite, Hospital admission, Bruising, Other complaints   Comments:  May be starting on Clindamycin 300 mg oral Q8H (No DDI noted) for her cyst.  She can't recall the duration.   She denies any changes.  She continues to drink Boost once daily.   She thinks that she has a cold; however, she already takes allegra every day.  She is congested at night but doesn't want to take anything right now.   Otherwise, above findings negative     Plan:    1. Ms. Hernandez's INR  is therapeutic today at 2.27. She continues daily boost drinks.  Instructed Ms. Hernandez to take warfarin 5 mg oral daily except 7.5mg ThursSat until recheck.  2. Repeat INR on Monday 11/30  3. Verbal information provided over the phone. Albania Hernandez expresses understanding by teach back and has no further questions at this time.  4. At 3 months, 1/28/2020, will determine if patient will be on warfarin long term and if she would like to apply for a home monitor.    Natalie Venegas, PharmD  11/24/20   10:06 EST

## 2020-11-30 ENCOUNTER — ANTICOAGULATION VISIT (OUTPATIENT)
Dept: PHARMACY | Facility: HOSPITAL | Age: 70
End: 2020-11-30

## 2020-11-30 DIAGNOSIS — Z95.2 S/P TAVR (TRANSCATHETER AORTIC VALVE REPLACEMENT): ICD-10-CM

## 2020-11-30 DIAGNOSIS — T82.09XD PROSTHETIC VALVE DYSFUNCTION, SUBSEQUENT ENCOUNTER: ICD-10-CM

## 2020-11-30 LAB — INR PPP: 1.82

## 2020-11-30 NOTE — PROGRESS NOTES
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    Anticoagulation Clinic INR History:  Date 10/28 11/9 11/12 11/16 11/19 11/23 11/30        Total WeeklyDose 0mg- baseline 25mg 37.5 mg 32.5mg 32.5mg 42.5 mg 40mg        INR 0.9 1.5 2.18 1.65 1.6 2.27 1.82        Notes 1st clinic COVID hold x2; Dec appetite Extra ASA; sick; no GLV; lactulose x3 days  Boost daily            Clinic Interview:  Tablet Strength: 1mg, 2mg, and 5mg  Estimated OOP cost: ~$5  .Verbal Release Authorization signed on 10/28/2020-- may speak with Norm White (sons) and Lashanda Matthew (sister)  Patient contact: ok to [facility] day time [phone]; afternoon: [phone]  Lab contact: Jean-Paul PETER    Patient Findings  Negatives:  Signs/symptoms of thrombosis, Signs/symptoms of bleeding, Laboratory test error suspected, Change in health, Change in alcohol use, Change in activity, Upcoming invasive procedure, Emergency department visit, Upcoming dental procedure, Missed doses, Extra doses, Change in medications, Change in diet/appetite, Hospital admission, Bruising, Other complaints   Comments:  May be starting on Clindamycin 300 mg oral Q8H (No DDI noted) for her cyst.  She can't recall the duration. SHe reports that her cyst isn't improving and may need to have it cut out. She is going on 12/2 and will call if any changes or if it will be cut out. She denies any changes.  She continues to drink Boost once daily.      Plan:  1. Ms. Hernandez's INR is therapeutic today at 1.83. She  continues daily boost drinks. Instructed Ms. Hernandez to BOOST dose tonight to 7.5mg and tomorrow to 7.5mg, 5mg Wednesday, and 7.5mg Thursday.   2. Repeat INR on Friday 12/4.  3. Verbal information provided over the phone. Albania Hernandez expresses understanding by teach back and has no further questions at this time.  4. At 3 months, 1/28/2020, will determine if patient will be on warfarin long term and if she would like to apply for a home monitor.     Essence Tee, PharmD  11/30/20   14:09 EST

## 2020-12-03 ENCOUNTER — TELEPHONE (OUTPATIENT)
Dept: PHARMACY | Facility: HOSPITAL | Age: 70
End: 2020-12-03

## 2020-12-03 NOTE — TELEPHONE ENCOUNTER
Patient called to report she is currently admitted at [facility] for cyst removal. She is unsure she will be discharged, agreeable to call clinic at that time.

## 2020-12-08 ENCOUNTER — ANTICOAGULATION VISIT (OUTPATIENT)
Dept: PHARMACY | Facility: HOSPITAL | Age: 70
End: 2020-12-08

## 2020-12-08 DIAGNOSIS — Z95.2 S/P TAVR (TRANSCATHETER AORTIC VALVE REPLACEMENT): ICD-10-CM

## 2020-12-08 DIAGNOSIS — T82.09XD PROSTHETIC VALVE DYSFUNCTION, SUBSEQUENT ENCOUNTER: ICD-10-CM

## 2020-12-08 LAB
INR PPP: 1.16
INR PPP: 1.27
INR PPP: 1.32
INR PPP: 1.37
INR PPP: 1.48
INR PPP: 1.54
INR PPP: 2.07

## 2020-12-08 NOTE — PROGRESS NOTES
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    Anticoagulation Clinic INR History:  Date 10/28 11/9 11/12 11/16 11/19 11/23 11/30 12/3-6 12/7      Total WeeklyDose 0mg- baseline 25mg 37.5 mg 32.5mg 32.5mg 42.5 mg 40mg [facility] admission 52.5mg??      INR 0.9 1.5 2.18 1.65 1.6 2.27 1.82  2.07      Notes 1st clinic COVID hold x2; Dec appetite Extra ASA; sick; no GLV; lactulose x3 days  Boost daily   Hold/procedure rec 12/8 doxy        Clinic Interview:  Tablet Strength: 1mg, 2mg, and 5mg  Estimated OOP cost: ~$5  .Verbal Release Authorization signed on 10/28/2020-- may speak with Norm White (sons) and Lashanda Matthew (sister)  Patient contact: ok to [facility] day time [phone]; afternoon: [phone]  Lab contact: [facility]    Positives:  Missed doses, Change in medications, Hospital admission   Negatives:  Signs/symptoms of thrombosis, Signs/symptoms of bleeding, Laboratory test error suspected, Change in health, Change in alcohol use, Change in activity, Upcoming invasive procedure, Emergency department visit, Upcoming dental procedure, Extra doses, Change in diet/appetite, Bruising, Other complaints   Comments:  Patient was admitted at [facility] for incision and draining of infected cyst on R shoulder. Discharged on doxy 100mg bid x4 more days. Unable to verify dosing while admitted, was held prior to procedure, given 10mg dose prior to discharge and \"continue alternating 5mg and 7.5mg\"   Was not drinking boost drinks during admission but resumed " yesterday   Patient reports she got 15mg warfarin Sat and Sun  (10mg AM and 5 PM) during admission to increase her INR enough for discharge         Plan:  1. INR was therapeutic yesterday at 2.07 following discharge from [facility] however large increase in one day (INR 1.3 on 12/6). Results received today. Instructed Ms. Hernandez to HOLD tonight's dose of warfarin then take 5mg Wednesday  2. Repeat INR 12/10  3. Verbal information provided over the phone. [place] Hernandez expresses understanding by teach back and has no further questions at this time.  4. At 3 months, 1/28/2020, will determine if patient will be on warfarin long term and if she would like to apply for a home monitor.     Chata Holguin, PharmD.  12/08/20   14:13 EST

## 2020-12-11 ENCOUNTER — ANTICOAGULATION VISIT (OUTPATIENT)
Dept: PHARMACY | Facility: HOSPITAL | Age: 70
End: 2020-12-11

## 2020-12-11 DIAGNOSIS — Z95.2 S/P TAVR (TRANSCATHETER AORTIC VALVE REPLACEMENT): ICD-10-CM

## 2020-12-11 DIAGNOSIS — T82.09XD PROSTHETIC VALVE DYSFUNCTION, SUBSEQUENT ENCOUNTER: ICD-10-CM

## 2020-12-11 LAB — INR PPP: 1.36

## 2020-12-11 NOTE — PROGRESS NOTES
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    Anticoagulation Clinic INR History:  Date 10/28 11/9 11/12 11/16 11/19 11/23 11/30 12/3-6 12/7 12/11     Total WeeklyDose 0mg- baseline 25mg 37.5 mg 32.5mg 32.5mg 42.5 mg 40mg [facility] admission 52.5mg?? 50mg?     INR 0.9 1.5 2.18 1.65 1.6 2.27 1.82  2.07 1.37     Notes 1st clinic COVID hold x2; Dec appetite Extra ASA; sick; no GLV; lactulose x3 days  Boost daily   Hold/procedure rec 12/8 doxy Resume ensure, 1x hold; doxy       Clinic Interview:  Tablet Strength: 1mg, 2mg, and 5mg  Estimated OOP cost: ~$5  .Verbal Release Authorization signed on 10/28/2020-- may speak with Norm White (sons) and Lashanda Matthew (sister)  Patient contact: ok to [facility] day time [phone]; afternoon: [phone]  Lab contact: Jean-Paul PETER      Negatives:  Signs/symptoms of thrombosis, Signs/symptoms of bleeding, Laboratory test error suspected, Change in health, Change in alcohol use, Change in activity, Upcoming invasive procedure, Emergency department visit, Upcoming dental procedure, Missed doses, Extra doses, Change in medications, Change in diet/appetite, Hospital admission, Bruising, Other complaints   Comments:  Finished doxy last night.        Plan:  1. INR was subtherapeutic today following held dose.  Instructed Ms. Hernandez to boost dose of warfarin to 7.5mg FriDUn then take 5mg SatMon  2. Repeat INR 12/15  3. Verbal information provided over the phone. [place] Hernandez expresses understanding by teach back and  has no further questions at this time.  4. At 3 months, 1/28/2020, will determine if patient will be on warfarin long term and if she would like to apply for a home monitor.     Chata Holguin PharmD.  12/11/20   15:08 EST

## 2020-12-15 ENCOUNTER — ANTICOAGULATION VISIT (OUTPATIENT)
Dept: PHARMACY | Facility: HOSPITAL | Age: 70
End: 2020-12-15

## 2020-12-15 DIAGNOSIS — Z95.2 S/P TAVR (TRANSCATHETER AORTIC VALVE REPLACEMENT): ICD-10-CM

## 2020-12-15 DIAGNOSIS — T82.09XD PROSTHETIC VALVE DYSFUNCTION, SUBSEQUENT ENCOUNTER: ICD-10-CM

## 2020-12-15 LAB — INR PPP: 1.5

## 2020-12-15 NOTE — PROGRESS NOTES
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    Anticoagulation Clinic INR History:  Date 10/28 11/9 11/12 11/16 11/19 11/23 11/30 12/3-6 12/7 12/11 12/15    Total WeeklyDose 0mg- baseline 25mg 37.5 mg 32.5mg 32.5mg 42.5 mg 40mg [facility] admission 52.5mg?? 50mg? 35mg    INR 0.9 1.5 2.18 1.65 1.6 2.27 1.82  2.07 1.37 1.50    Notes 1st clinic COVID hold x2; Dec appetite Extra ASA; sick; no GLV; lactulose x3 days  Boost daily   Hold/procedure rec 12/8 doxy Resume ensure, 1x hold; doxy       Clinic Interview:  Tablet Strength: 1mg, 2mg, and 5mg  Estimated OOP cost: ~$5  .Verbal Release Authorization signed on 10/28/2020-- may speak with Norm White (sons) and Lashanda Matthew (sister)  Patient contact: ok to [facility] day time [phone]; afternoon: [phone]  Lab contact: Jean-Paul PETER    Negatives:  Signs/symptoms of thrombosis, Signs/symptoms of bleeding, Laboratory test error suspected, Change in health, Change in alcohol use, Change in activity, Upcoming invasive procedure, Emergency department visit, Upcoming dental procedure, Missed doses, Extra doses, Change in medications, Change in diet/appetite, Hospital admission, Bruising, Other complaints     Plan:  1. INR remains subtherapeutic today at 1.5  Instructed Ms. Hernandez to boost dose of warfarin to 7.5mg TuesThurs and 5mg Wed  2. Repeat INR Friday  3. Verbal information provided over the phone. [place] Hernandez expresses understanding by teach back and has no further questions at this time.  4. At 3  months, 1/28/2020, will determine if patient will be on warfarin long term and if she would like to apply for a home monitor.       Chata Holguin, ManuelD.  12/15/20   16:10 EST

## 2020-12-18 ENCOUNTER — ANTICOAGULATION VISIT (OUTPATIENT)
Dept: PHARMACY | Facility: HOSPITAL | Age: 70
End: 2020-12-18

## 2020-12-18 DIAGNOSIS — T82.09XD PROSTHETIC VALVE DYSFUNCTION, SUBSEQUENT ENCOUNTER: ICD-10-CM

## 2020-12-18 DIAGNOSIS — Z95.2 S/P TAVR (TRANSCATHETER AORTIC VALVE REPLACEMENT): ICD-10-CM

## 2020-12-18 LAB — INR PPP: 1.62

## 2020-12-18 NOTE — PROGRESS NOTES
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    Anticoagulation Clinic INR History:  Date 10/28 11/9 11/12 11/16 11/19 11/23 11/30 12/3-6 12/7 12/11 12/15 12/18   Total WeeklyDose 0mg- baseline 25mg 37.5 mg 32.5mg 32.5mg 42.5 mg 40mg [facility] admission 52.5mg?? 50mg? 35mg 45mg   INR 0.9 1.5 2.18 1.65 1.6 2.27 1.82  2.07 1.37 1.50 1.62   Notes 1st clinic COVID hold x2; Dec appetite Extra ASA; sick; no GLV; lactulose x3 days  Boost daily   Hold/procedure rec 12/8 doxy Resume ensure, 1x hold; doxy       Clinic Interview:  Tablet Strength: 1mg, 2mg, and 5mg  Estimated OOP cost: ~$5  .Verbal Release Authorization signed on 10/28/2020-- may speak with Norm White (sons) and Lashanda Matthew (sister)  Patient contact: ok to [facility] day time [phone]; afternoon: [phone]  Lab contact: Jean-Paul PETER    Negatives:  Signs/symptoms of thrombosis, Signs/symptoms of bleeding, Laboratory test error suspected, Change in health, Change in alcohol use, Change in activity, Upcoming invasive procedure, Emergency department visit, Upcoming dental procedure, Missed doses, Extra doses, Change in medications, Change in diet/appetite, Hospital admission, Bruising, Other complaints    Continues boost daily           Plan:  1. INR remains subtherapeutic today at 1.62  Instructed Ms. Hernandez to boost dose of warfarin to 7.5mg daily except 5mg Sunday UNTIL RECHECK  2. Repeat INR 12/22  3. Verbal information provided over the phone. [place] Hernandez expresses understanding by  teach back and has no further questions at this time.  4. At 3 months, 1/28/2020, will determine if patient will be on warfarin long term and if she would like to apply for a home monitor.     Manuel HaywoodD.  12/18/20   15:39 EST

## 2020-12-22 ENCOUNTER — ANTICOAGULATION VISIT (OUTPATIENT)
Dept: PHARMACY | Facility: HOSPITAL | Age: 70
End: 2020-12-22

## 2020-12-22 DIAGNOSIS — T82.09XD PROSTHETIC VALVE DYSFUNCTION, SUBSEQUENT ENCOUNTER: ICD-10-CM

## 2020-12-22 DIAGNOSIS — Z95.2 S/P TAVR (TRANSCATHETER AORTIC VALVE REPLACEMENT): ICD-10-CM

## 2020-12-22 LAB — INR PPP: 1.88

## 2020-12-22 RX ORDER — WARFARIN SODIUM 5 MG/1
TABLET ORAL
Qty: 90 TABLET | Refills: 0 | Status: SHIPPED | OUTPATIENT
Start: 2020-12-22 | End: 2021-02-22 | Stop reason: SDUPTHER

## 2020-12-22 NOTE — PROGRESS NOTES
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    Anticoagulation Clinic INR History:  Date 10/28 11/9 11/12 11/16 11/19 11/23 11/30 12/3-6 12/7 12/11 12/15 12/18   Total WeeklyDose 0mg- baseline 25mg 37.5 mg 32.5mg 32.5mg 42.5 mg 40mg [facility] admission 52.5mg?? 50mg? 35mg 45mg   INR 0.9 1.5 2.18 1.65 1.6 2.27 1.82  2.07 1.37 1.50 1.62   Notes 1st clinic COVID hold x2; Dec appetite Extra ASA; sick; no GLV; lactulose x3 days  Boost daily   Hold/procedure rec 12/8 doxy Resume ensure, 1x hold; doxy         Date 12/22              Total WeeklyDose 47.5mg              INR 1.88              Notes                   Clinic Interview:  Tablet Strength: 1mg, 2mg, and 5mg  Estimated OOP cost: ~$5  .Verbal Release Authorization signed on 10/28/2020-- may speak with Norm White (sons) and Lashanda Matthew (sister)  Patient contact: ok to [facility] day time [phone]; afternoon: [phone]  Lab contact: Jean-Paul PETER    Negatives:  Signs/symptoms of thrombosis, Signs/symptoms of bleeding, Laboratory test error suspected, Change in health, Change in alcohol use, Change in activity, Upcoming invasive procedure, Emergency department visit, Upcoming dental procedure, Missed doses, Extra doses, Change in medications, Change in diet/appetite, Hospital admission, Bruising, Other complaints       Plan:  1. INR remains subtherapeutic today at 1.88, but improved  Instructed Ms. Hernandez to continue this dose of warfarin: 7.5mg daily except 5mg Sundaythurs UNTIL RECHECK  2. Repeat  INR 12/29  3. Verbal information provided over the phone. [place] Hernandez expresses understanding by teach back and has no further questions at this time.  4. At 3 months, 1/28/2020, will determine if patient will be on warfarin long term and if she would like to apply for a home monitor.    Chata Holguin, ManuelD.  12/22/20   14:12 EST

## 2020-12-29 ENCOUNTER — ANTICOAGULATION VISIT (OUTPATIENT)
Dept: PHARMACY | Facility: HOSPITAL | Age: 70
End: 2020-12-29

## 2020-12-29 DIAGNOSIS — Z95.2 S/P TAVR (TRANSCATHETER AORTIC VALVE REPLACEMENT): ICD-10-CM

## 2020-12-29 DIAGNOSIS — T82.09XD PROSTHETIC VALVE DYSFUNCTION, SUBSEQUENT ENCOUNTER: ICD-10-CM

## 2020-12-29 LAB — INR PPP: 1.6

## 2020-12-29 NOTE — PROGRESS NOTES
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    Anticoagulation Clinic INR History:  Date 10/28 11/9 11/12 11/16 11/19 11/23 11/30 12/3-6 12/7 12/11 12/15 12/18   Total WeeklyDose 0mg- baseline 25mg 37.5 mg 32.5mg 32.5mg 42.5 mg 40mg [facility] admission 52.5mg?? 50mg? 35mg 45mg   INR 0.9 1.5 2.18 1.65 1.6 2.27 1.82  2.07 1.37 1.50 1.62   Notes 1st clinic COVID hold x2; Dec appetite Extra ASA; sick; no GLV; lactulose x3 days  Boost daily   Hold/procedure rec 12/8 doxy Resume ensure, 1x hold; doxy         Date 12/22 12/29             Total WeeklyDose 47.5mg 47.5mg             INR 1.88 1.60             Notes                   Clinic Interview:  Tablet Strength: 1mg, 2mg, and 5mg  Estimated OOP cost: ~$5  .Verbal Release Authorization signed on 10/28/2020-- may speak with Norm White (sons) and Lashanda Matthew (sister)  Patient contact: ok to [facility] day time [phone]; afternoon: [phone]  Lab contact: Jean-Paul PETER    Negatives:  Signs/symptoms of thrombosis, Signs/symptoms of bleeding, Laboratory test error suspected, Change in health, Change in alcohol use, Change in activity, Upcoming invasive procedure, Emergency department visit, Upcoming dental procedure, Missed doses, Extra doses, Change in medications, Change in diet/appetite, Hospital admission, Bruising, Other complaints   Comments:  Continues daily boost; spoonful of green beans.        Plan:  1. INR remains subtherapeutic today at 1.60.   Instructed Ms. Hernandez to BOOST tonight's dose to  10mg then  increase dose of warfarin: 7.5mg daily UNTIL RECHECK  2. Repeat INR 1/5  3. Verbal information provided over the phone. [place] MAYI Hernandez expresses understanding by teach back and has no further questions at this time.  4. At 3 months, 1/28/2020, will determine if patient will be on warfarin long term and if she would like to apply for a home monitor.    Manuel HaywoodD.  12/29/20   15:24 EST

## 2021-01-05 LAB — INR PPP: 1.59

## 2021-01-06 ENCOUNTER — ANTICOAGULATION VISIT (OUTPATIENT)
Dept: PHARMACY | Facility: HOSPITAL | Age: 71
End: 2021-01-06

## 2021-01-06 DIAGNOSIS — Z95.2 S/P TAVR (TRANSCATHETER AORTIC VALVE REPLACEMENT): ICD-10-CM

## 2021-01-06 DIAGNOSIS — T82.09XD PROSTHETIC VALVE DYSFUNCTION, SUBSEQUENT ENCOUNTER: ICD-10-CM

## 2021-01-06 NOTE — PROGRESS NOTES
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    Anticoagulation Clinic INR History:  Date 10/28 11/9 11/12 11/16 11/19 11/23 11/30 12/3-6 12/7 12/11 12/15 12/18   Total WeeklyDose 0mg- baseline 25mg 37.5 mg 32.5mg 32.5mg 42.5 mg 40mg [facility] admission 52.5mg?? 50mg? 35mg 45mg   INR 0.9 1.5 2.18 1.65 1.6 2.27 1.82  2.07 1.37 1.50 1.62   Notes 1st clinic COVID hold x2; Dec appetite Extra ASA; sick; no GLV; lactulose x3 days  Boost daily   Hold/procedure rec 12/8 doxy Resume ensure, 1x hold; doxy         Date 12/22 12/29 1/6/2021            Total WeeklyDose 47.5mg 47.5mg 52.5mg            INR 1.88 1.60 1.59            Notes   rec'v 1/6                Clinic Interview:  Tablet Strength: 1mg, 2mg, and 5mg  Estimated OOP cost: ~$5  .Verbal Release Authorization signed on 10/28/2020-- may speak with Nomr White (sons) and Lashanda Matthew (sister)  Patient contact: ok to [facility] day time [phone]; afternoon: [phone]  Lab contact: Jean-Paul PETER    Patient Findings  Negatives:  Signs/symptoms of thrombosis, Signs/symptoms of bleeding, Laboratory test error suspected, Change in health, Change in alcohol use, Change in activity, Upcoming invasive procedure, Emergency department visit, Upcoming dental procedure, Missed doses, Extra doses, Change in medications, Change in diet/appetite, Hospital admission, Bruising, Other complaints     Plan:  1. INR remains subtherapeutic today at 1.59 after boosted dose and weekly increase.  Instructed Ms. Hernandez to increase  dose of warfarin: 7.5mg daily except 10mg on WedThurSat this week. She has remained subtherapeutic despite dose increase.   2. Repeat INR 1/11/2021.   3. Verbal information provided over the phone. [place] SEE Hernandez expresses understanding by teach back and has no further questions at this time.  4. At 3 months, 1/28/2020, will determine if patient will be on warfarin long term and if she would like to apply for a home monitor.    Essence Tee, PharmD  1/6/2021  08:46 EST

## 2021-01-11 ENCOUNTER — ANTICOAGULATION VISIT (OUTPATIENT)
Dept: PHARMACY | Facility: HOSPITAL | Age: 71
End: 2021-01-11

## 2021-01-11 DIAGNOSIS — T82.09XD PROSTHETIC VALVE DYSFUNCTION, SUBSEQUENT ENCOUNTER: ICD-10-CM

## 2021-01-11 DIAGNOSIS — Z95.2 S/P TAVR (TRANSCATHETER AORTIC VALVE REPLACEMENT): ICD-10-CM

## 2021-01-11 LAB — INR PPP: 2.58

## 2021-01-11 NOTE — PROGRESS NOTES
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    Anticoagulation Clinic INR History:  Date 10/28 11/9 11/12 11/16 11/19 11/23 11/30 12/3-6 12/7 12/11 12/15 12/18   Total WeeklyDose 0mg- baseline 25mg 37.5 mg 32.5mg 32.5mg 42.5 mg 40mg [facility] admission 52.5mg?? 50mg? 35mg 45mg   INR 0.9 1.5 2.18 1.65 1.6 2.27 1.82  2.07 1.37 1.50 1.62   Notes 1st clinic COVID hold x2; Dec appetite Extra ASA; sick; no GLV; lactulose x3 days  Boost daily   Hold/procedure rec 12/8 doxy Resume ensure, 1x hold; doxy         Date 12/22 12/29 1/6/2021 01/11/21           Total WeeklyDose 47.5mg 47.5mg 52.5mg 60mg           INR 1.88 1.60 1.59 2.58           Notes   rec'v 1/6                Clinic Interview:  Tablet Strength: 1mg, 2mg, and 5mg  Estimated OOP cost: ~$5  .Verbal Release Authorization signed on 10/28/2020-- may speak with Norm White (sons) and Lashanda Matthew (sister)  Patient contact: ok to [facility] day time [phone]; afternoon: [phone]  Lab contact: Jean-Paul PETER    Patient Findings  Negatives:  Signs/symptoms of thrombosis, Signs/symptoms of bleeding, Laboratory test error suspected, Change in health, Change in alcohol use, Change in activity, Upcoming invasive procedure, Emergency department visit, Upcoming dental procedure, Missed doses, Extra doses, Change in medications, Change in diet/appetite, Hospital admission, Bruising, Other complaints   Comments:  Patient said she hardly ever eats GLV anymore and didn't have any over the last week. Continues to  have Boost drinks daily.      Plan:  1. INR is therapeutic today at 2.58 following dose increase. Consulted with Chata Holguin, PharmD, and instructed Ms. Hernandez to begin warfarin 7.5mg daily except 10mg on WedSat this week (57.5mg/week). Trying to find maintenance dose at this time.  2. Repeat INR in one week.  3. Verbal information provided over the phone. [place] Hernandez expresses understanding by teach back and has no further questions at this time.  4. At 3 months, 1/28/2020, will determine if patient will be on warfarin long term and if she would like to apply for a home monitor.    Chika Soto, Pharmacy Intern  01/11/21  14:02 EST    Originally was instructed to increase dose to 10mg TueFri of this week. This will cause the patient to have several days of 10mg with dose from last week. Would prefer to aim for 10mg WedSat to coincide with last week.    Patient aware  I, Chata Holguin, PharmD, have reviewed the note in full and agree with the assessment and plan.  01/12/21  09:51 EST

## 2021-01-18 ENCOUNTER — ANTICOAGULATION VISIT (OUTPATIENT)
Dept: PHARMACY | Facility: HOSPITAL | Age: 71
End: 2021-01-18

## 2021-01-18 DIAGNOSIS — T82.09XD PROSTHETIC VALVE DYSFUNCTION, SUBSEQUENT ENCOUNTER: ICD-10-CM

## 2021-01-18 DIAGNOSIS — Z95.2 S/P TAVR (TRANSCATHETER AORTIC VALVE REPLACEMENT): ICD-10-CM

## 2021-01-18 LAB — INR PPP: 3.06

## 2021-01-18 NOTE — PROGRESS NOTES
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    Anticoagulation Clinic INR History:  Date 10/28 11/9 11/12 11/16 11/19 11/23 11/30 12/3-6 12/7 12/11 12/15 12/18   Total WeeklyDose 0mg- baseline 25mg 37.5 mg 32.5mg 32.5mg 42.5 mg 40mg [facility] admission 52.5mg?? 50mg? 35mg 45mg   INR 0.9 1.5 2.18 1.65 1.6 2.27 1.82  2.07 1.37 1.50 1.62   Notes 1st clinic COVID hold x2; Dec appetite Extra ASA; sick; no GLV; lactulose x3 days  Boost daily   Hold/procedure rec 12/8 doxy Resume ensure, 1x hold; doxy         Date 12/22 12/29 1/6/2021 01/11/21 1/18          Total WeeklyDose 47.5mg 47.5mg 52.5mg 60mg 57.5mg          INR 1.88 1.60 1.59 2.58 3.06          Notes   rec'v 1/6                Clinic Interview:  Tablet Strength: 1mg, 2mg, and 5mg  Estimated OOP cost: ~$5  .Verbal Release Authorization signed on 10/28/2020-- may speak with Norm White (sons) and Lashanda Matthew (sister)  Patient contact: ok to [facility] day time [phone]; afternoon: [phone]  Lab contact: Jean-Paul PETER    Patient Findings  Negatives:  Signs/symptoms of thrombosis, Signs/symptoms of bleeding, Laboratory test error suspected, Change in health, Change in alcohol use, Change in activity, Upcoming invasive procedure, Emergency department visit, Upcoming dental procedure, Missed doses, Extra doses, Change in medications, Change in diet/appetite, Hospital admission, Bruising, Other complaints       Plan:  1. INR is slightly supratherapeutic today at 3.06, increased despite dose increase.  Instructed Ms. Hernandez to begin warfarin 7.5mg daily except 10mg on Sat this week (55mg/week). Trying to find maintenance dose at this time.  2. Repeat INR in one week.  3. Verbal information provided over the phone. [place] Hernandez expresses understanding by teach back and has no further questions at this time.  4. At 3 months, 1/28/2020, will determine if patient will be on warfarin long term and if she would like to apply for a home monitor.    Chata Holguin, Roz.  01/18/21   10:15 EST

## 2021-01-25 ENCOUNTER — ANTICOAGULATION VISIT (OUTPATIENT)
Dept: PHARMACY | Facility: HOSPITAL | Age: 71
End: 2021-01-25

## 2021-01-25 DIAGNOSIS — T82.09XD PROSTHETIC VALVE DYSFUNCTION, SUBSEQUENT ENCOUNTER: ICD-10-CM

## 2021-01-25 DIAGNOSIS — Z95.2 S/P TAVR (TRANSCATHETER AORTIC VALVE REPLACEMENT): ICD-10-CM

## 2021-01-25 LAB — INR PPP: 2.12

## 2021-01-25 NOTE — PROGRESS NOTES
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    Anticoagulation Clinic INR History:  Date 10/28 11/9 11/12 11/16 11/19 11/23 11/30 12/3-6 12/7 12/11 12/15 12/18   Total WeeklyDose 0mg- baseline 25mg 37.5 mg 32.5mg 32.5mg 42.5mg 40mg [facility] admit 52.5mg?? 50mg? 35mg 45mg   INR 0.9 1.5 2.18 1.65 1.6 2.27 1.82  2.07 1.37 1.50 1.62   Notes 1st clinic COVID hold x2; decr appetite Sick;   incr ASA;  no GLV; lactulose x3 days  Boost daily   hold / procedure recv'd 12/8; doxy resume Ensure,   1x hold; doxy       Date 12/22 12/29 1/5/21 1/11 1/18 1/25         Total WeeklyDose 47.5mg 47.5mg 52.5mg 60mg 57.5mg 55mg 57.5mg        INR 1.88 1.60 1.59 2.58 3.06 2.12         Notes                   Phone Interview:  Tablet Strength: 1mg, 2mg, and 5mg  Estimated OOP cost: ~$5  Verbal Release Authorization signed on 10/28/2020-- may speak with Norm White (sons) and Lashanda Matthew (sister)  Patient contact: ok to [facility] day time [phone]; afternoon: [phone]  Lab contact: Jean-Paul PETER    Patient Findings  Negatives:  Signs/symptoms of thrombosis, Signs/symptoms of bleeding, Laboratory test error suspected, Change in health, Change in alcohol use, Change in activity, Upcoming invasive procedure, Emergency department visit, Upcoming dental procedure, Missed doses, Extra doses, Change in medications, Change in diet/appetite, Hospital admission, Bruising, Other complaints   Comments:  Patient denies all findings. She confirms she has zero GLV intake and continues to drink 1x Boost  per day.     Plan:  1. INR is therapeutic and back WNL today at 2.12. After consulting with Natalie Venegas, PharmD, instructed Ms. Hernandez to take warfarin 7.5mg oral daily except 10mg on WedSat (57.5mg/week) until recheck. Trying to find maintenance dose at this time.  2. Repeat INR in one week, 2/1  3. Verbal information provided over the phone. Albania Hernandez expresses understanding by teach back and has no further questions at this time.  4. At 3 months, 1/28/2020, will determine if patient will be on warfarin long term and if she would like to apply for a home monitor.    Myke Samano, Nii  1/25/2021  12:01 LILLIAM DANIELLE, Chata Holguin, PharmD, have reviewed the note in full and agree with the assessment and plan.  01/26/21  08:52 EST

## 2021-02-01 ENCOUNTER — ANTICOAGULATION VISIT (OUTPATIENT)
Dept: PHARMACY | Facility: HOSPITAL | Age: 71
End: 2021-02-01

## 2021-02-01 DIAGNOSIS — Z95.2 S/P TAVR (TRANSCATHETER AORTIC VALVE REPLACEMENT): ICD-10-CM

## 2021-02-01 DIAGNOSIS — T82.09XD PROSTHETIC VALVE DYSFUNCTION, SUBSEQUENT ENCOUNTER: ICD-10-CM

## 2021-02-01 LAB — INR PPP: 1.54

## 2021-02-01 NOTE — PROGRESS NOTES
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    Anticoagulation Clinic INR History:  Date 10/28 11/9 11/12 11/16 11/19 11/23 11/30 12/3-6 12/7 12/11 12/15 12/18   Total WeeklyDose 0mg- baseline 25mg 37.5 mg 32.5mg 32.5mg 42.5mg 40mg [facility] admit 52.5mg?? 50mg? 35mg 45mg   INR 0.9 1.5 2.18 1.65 1.6 2.27 1.82  2.07 1.37 1.50 1.62   Notes 1st clinic COVID hold x2; decr appetite Sick;   incr ASA;  no GLV; lactulose x3 days  Boost daily   hold / procedure recv'd 12/8; doxy resume Ensure,   1x hold; doxy       Date 12/22 12/29 1/5/21 1/11 1/18 1/25 2/1        Total WeeklyDose 47.5mg 47.5mg 52.5mg 60mg 57.5mg 55mg 50mg        INR 1.88 1.60 1.59 2.58 3.06 2.12 1.54        Notes       1x miss            Phone Interview:  Tablet Strength: 1mg, 2mg, and 5mg  Estimated OOP cost: ~$5  Verbal Release Authorization signed on 10/28/2020-- may speak with Norm White (sons) and Lashanda Matthew (sister)  Patient contact: ok to [facility] day time [phone]; afternoon: [phone]  Lab contact: Jean-Paul PETER    Patient Findings    Positives:  Missed doses   Negatives:  Signs/symptoms of thrombosis, Signs/symptoms of bleeding, Laboratory test error suspected, Change in health, Change in alcohol use, Change in activity, Upcoming invasive procedure, Emergency department visit, Upcoming dental procedure, Extra doses, Change in medications, Change in diet/appetite, Hospital admission, Bruising, Other complaints   Comments:  1x miss on wednesday-- moved 10mg  doseThurs Sun to make up for it          Plan:  1. INR is SUBtherapeutic today at 1.54 following missed dose Instructed Ms. Hernandez to BOOST tonight's dose to 10mg then take warfarin 7.5mg oral daily except 10mg on WedSat  until recheck. Trying to find maintenance dose at this time, 57.5mg may be good dose, difficult to assess with missed dose.  2. Repeat INR in one week, 2/8  3. Verbal information provided over the phone. Albania Hernandez expresses understanding by teach back and has no further questions at this time.  4. At 3 months, 1/28/2020, will determine if patient will be on warfarin long term and if she would like to apply for a home monitor.      Chata Holguin, ManuelD.  02/01/21   09:59 EST

## 2021-02-08 ENCOUNTER — ANTICOAGULATION VISIT (OUTPATIENT)
Dept: PHARMACY | Facility: HOSPITAL | Age: 71
End: 2021-02-08

## 2021-02-08 DIAGNOSIS — Z95.2 S/P TAVR (TRANSCATHETER AORTIC VALVE REPLACEMENT): ICD-10-CM

## 2021-02-08 DIAGNOSIS — T82.09XD PROSTHETIC VALVE DYSFUNCTION, SUBSEQUENT ENCOUNTER: ICD-10-CM

## 2021-02-08 LAB — INR PPP: 2.52

## 2021-02-08 NOTE — PROGRESS NOTES
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    Anticoagulation Clinic INR History:  Date 10/28 11/9 11/12 11/16 11/19 11/23 11/30 12/3-6 12/7 12/11 12/15 12/18   Total WeeklyDose 0mg- baseline 25mg 37.5 mg 32.5mg 32.5mg 42.5mg 40mg [facility] admit 52.5mg?? 50mg? 35mg 45mg   INR 0.9 1.5 2.18 1.65 1.6 2.27 1.82  2.07 1.37 1.50 1.62   Notes 1st clinic COVID hold x2; decr appetite Sick;   incr ASA;  no GLV; lactulose x3 days  Boost daily   hold / procedure recv'd 12/8; doxy resume Ensure,   1x hold; doxy       Date 12/22 12/29 1/5/21 1/11 1/18 1/25 2/1 2/8       Total WeeklyDose 47.5mg 47.5mg 52.5mg 60mg 57.5mg 55mg 50mg 60mg 57.5mg      INR 1.88 1.60 1.59 2.58 3.06 2.12 1.54 2.52       Notes       1x miss 1x incr dose         Phone Interview:  Tablet Strength: 1mg, 2mg, and 5mg  Estimated OOP cost: ~$5  Verbal Release Authorization signed on 10/28/2020-- may speak with Norm White (sons) and Lashanda Matthew (sister)  Patient contact: ok to [facility] day time [phone]; afternoon: [phone]  Lab contact: Jean-Paul [facility]    Patient Findings  Negatives:  Signs/symptoms of thrombosis, Signs/symptoms of bleeding, Laboratory test error suspected, Change in health, Change in alcohol use, Change in activity, Upcoming invasive procedure, Emergency department visit, Upcoming dental procedure, Missed doses, Extra doses, Change in medications, Change in diet/appetite, Hospital admission, Bruising, Other complaints     Plan:  1. INR is therapeutic and back WNL today at 2.52. Instructed  Ms. Hernandez to take warfarin 7.5mg oral daily except 10mg on WedSat until recheck (57.5mg/week)  2. Repeat INR in one week  3. Verbal information provided over the phone. Albania Hernandez expresses understanding by teach back and has no further questions at this time.  4. Patient is agreeable that we start process for HM from [facility]. She understands this process may take several weeks and she will need to continue to have INR drawn at lab     Myke Samano CPhT  2/8/2021  11:48 Chata IRBY, PharmD, have reviewed the note in full and agree with the assessment and plan.  02/09/21  09:08 EST

## 2021-02-11 ENCOUNTER — APPOINTMENT (OUTPATIENT)
Dept: CARDIOLOGY | Facility: HOSPITAL | Age: 71
End: 2021-02-11

## 2021-02-12 ENCOUNTER — TELEPHONE (OUTPATIENT)
Dept: CARDIOLOGY | Facility: HOSPITAL | Age: 71
End: 2021-02-12

## 2021-02-12 NOTE — TELEPHONE ENCOUNTER
TAVR DORA    Patient was scheduled for one year post TAVR visit yesterday w/ echo to see Dr. Rodney.  Canceled due to weather.  Called patient to collect [id number].  Score is 59/70.  She is active and working full time.  Her only complaint is frequent lab draws for warfarin that is difficulty to regulate.  Keep plans to see Dr. Rodney 4/21.    Penny JOHN

## 2021-02-22 ENCOUNTER — ANTICOAGULATION VISIT (OUTPATIENT)
Dept: PHARMACY | Facility: HOSPITAL | Age: 71
End: 2021-02-22

## 2021-02-22 DIAGNOSIS — Z95.2 S/P TAVR (TRANSCATHETER AORTIC VALVE REPLACEMENT): ICD-10-CM

## 2021-02-22 DIAGNOSIS — T82.09XD PROSTHETIC VALVE DYSFUNCTION, SUBSEQUENT ENCOUNTER: ICD-10-CM

## 2021-02-22 LAB
INR PPP: 2.43
INR PPP: 2.84

## 2021-02-22 RX ORDER — WARFARIN SODIUM 5 MG/1
TABLET ORAL
Qty: 150 TABLET | Refills: 0 | Status: SHIPPED | OUTPATIENT
Start: 2021-02-22 | End: 2021-06-01 | Stop reason: SDUPTHER

## 2021-02-22 NOTE — PROGRESS NOTES
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    Anticoagulation Clinic INR History:  Date 10/28 11/9 11/12 11/16 11/19 11/23 11/30 12/3-6 12/7 12/11 12/15 12/18   Total WeeklyDose 0mg- baseline 25mg 37.5 mg 32.5mg 32.5mg 42.5mg 40mg [facility] admit 52.5mg?? 50mg? 35mg 45mg   INR 0.9 1.5 2.18 1.65 1.6 2.27 1.82  2.07 1.37 1.50 1.62   Notes 1st clinic COVID hold x2; decr appetite Sick;   incr ASA;  no GLV; lactulose x3 days  Boost daily   hold / procedure recv'd 12/8; doxy resume Ensure,   1x hold; doxy       Date 12/22 12/29 1/5/21 1/11 1/18 1/25 2/1 2/8 2/15 2/22     Total WeeklyDose 47.5mg 47.5mg 52.5mg 60mg 57.5mg 55mg 50mg 60mg 57.5mg 57.5mg     INR 1.88 1.60 1.59 2.58 3.06 2.12 1.54 2.52 2.43 2.84     Notes       1x miss 1x incr dose recv'd 2/22        Phone Interview:  Tablet Strength: 1mg, 2mg, and 5mg  Estimated OOP cost: ~$5  Verbal Release Authorization signed on 10/28/2020-- may speak with Norm White (sons) and Lashanda Matthew (sister)  Patient contact: ok to [facility] day time [phone]; afternoon: [phone]  Lab contact: Jean-Paul PETER    Patient Findings  Negatives:  Signs/symptoms of thrombosis, Signs/symptoms of bleeding, Laboratory test error suspected, Change in health, Change in alcohol use, Change in activity, Upcoming invasive procedure, Emergency department visit, Upcoming dental procedure, Missed doses, Extra doses, Change in medications, Change in diet/appetite, Hospital admission, Bruising, Other complaints     Plan:  1. INR is therapeutic today  at 2.84. Patient also had INR drawn on 2/15, results at 2.43. Instructed Ms. Hernandez to continue warfarin 7.5mg oral daily except 10mg on WedSat until recheck (57.5mg/week)  2. Repeat INR in 2 weeks, 3/8  3. Verbal information provided over the phone. Albania Hernandez expresses understanding by teach back and has no further questions at this time.  4. Patient is agreeable that we start process for HM from [facility]. She understands this process may take several weeks and she will need to continue to have INR drawn at lab     Myke Samano CPhT  2/22/2021  15:26 EST     I, Essence Tee, PharmD, have reviewed the note in full and agree with the assessment and plan.  02/23/21  08:11 EST

## 2021-03-05 ENCOUNTER — ANTICOAGULATION VISIT (OUTPATIENT)
Dept: PHARMACY | Facility: HOSPITAL | Age: 71
End: 2021-03-05

## 2021-03-05 DIAGNOSIS — Z95.2 S/P TAVR (TRANSCATHETER AORTIC VALVE REPLACEMENT): ICD-10-CM

## 2021-03-05 DIAGNOSIS — T82.09XD PROSTHETIC VALVE DYSFUNCTION, SUBSEQUENT ENCOUNTER: ICD-10-CM

## 2021-03-05 LAB — INR PPP: 3

## 2021-03-12 ENCOUNTER — ANTICOAGULATION VISIT (OUTPATIENT)
Dept: PHARMACY | Facility: HOSPITAL | Age: 71
End: 2021-03-12

## 2021-03-12 DIAGNOSIS — Z95.2 S/P TAVR (TRANSCATHETER AORTIC VALVE REPLACEMENT): ICD-10-CM

## 2021-03-12 DIAGNOSIS — T82.09XD PROSTHETIC VALVE DYSFUNCTION, SUBSEQUENT ENCOUNTER: ICD-10-CM

## 2021-03-12 LAB — INR PPP: 3.2

## 2021-03-12 NOTE — PROGRESS NOTES
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    Anticoagulation Clinic INR History:  Date 10/28 11/9 11/12 11/16 11/19 11/23 11/30 12/3-6 12/7 12/11 12/15 12/18   Total WeeklyDose 0mg- baseline 25mg 37.5 mg 32.5mg 32.5mg 42.5mg 40mg [facility] admit 52.5mg  ?? 50mg  ?? 35mg 45mg   INR 0.9 1.5 2.18 1.65 1.6 2.27 1.82  2.07 1.37 1.50 1.62   Notes 1st clinic COVID hold x2; decr appetite Sick;   incr ASA;  no GLV; lactulose x3 days  Boost daily   hold / procedure recv'd 12/8; doxy resume Ensure,   1x hold; doxy       Date 12/22 12/29 1/5/21 1/11 1/18 1/25 2/1 2/8 2/15 2/22 3/5 3/12   Total WeeklyDose 47.5mg 47.5mg 52.5mg 60mg 57.5mg 55mg 50mg 60mg 57.5mg 57.5mg 57.5mg 57.5mg   INR 1.88 1.60 1.59 2.58 3.06 2.12 1.54 2.52 2.43 2.84 3.0 3.2   Notes       1x miss 1x incr dose recv'd 2/22   training      Date               Total WeeklyDose 55mg              INR               Notes 1x decr dose;                   Phone Interview:  Tablet Strength: 1mg, 2mg, and 5mg  Estimated OOP cost: ~$5  Verbal Release Authorization signed on 10/28/2020-- may speak with Norm White (sons) and Lashanda Quinonesier (sister)  Patient contact: ok to [facility] day time [phone]; afternoon: [phone]  Lab contact: Jean-Paul PETER    Patient Findings  Negatives:  Signs/symptoms of thrombosis, Signs/symptoms of bleeding, Laboratory test error suspected, Change in health, Change in alcohol use, Change in activity, Upcoming invasive procedure, Emergency department  visit, Upcoming dental procedure, Missed doses, Extra doses, Change in medications, Change in diet/appetite, Hospital admission, Bruising, Other complaints     Plan:  1. INR is SUPRAtherapeutic today at 3.2. Instructed Ms. Hernandez to DECREASE tomorrow, 3/13, dose to 7.5mg and then continue maintenance dose of warfarin 7.5mg oral daily except 10mg on WedSat until recheck (57.5mg/week).  2. Repeat INR in one week. Patient will self test weekly on HM for at least 4 weeks to ensure compliance. Approx 4/2/21 encounter patient will be eligible to resume 2-4x a month as appropriate.  3. Verbal information provided over the phone. Albania Hernandez expresses understanding by teach back and has no further questions at this time.    Myke Samano, GIO  3/12/2021  14:32 Chata IRBY, PharmD, have reviewed the note in full and agree with the assessment and plan.  03/12/21  15:36 EST

## 2021-03-19 ENCOUNTER — ANTICOAGULATION VISIT (OUTPATIENT)
Dept: PHARMACY | Facility: HOSPITAL | Age: 71
End: 2021-03-19

## 2021-03-19 DIAGNOSIS — Z95.2 S/P TAVR (TRANSCATHETER AORTIC VALVE REPLACEMENT): ICD-10-CM

## 2021-03-19 DIAGNOSIS — T82.09XD PROSTHETIC VALVE DYSFUNCTION, SUBSEQUENT ENCOUNTER: ICD-10-CM

## 2021-03-19 LAB — INR PPP: 3.6

## 2021-03-29 ENCOUNTER — ANTICOAGULATION VISIT (OUTPATIENT)
Dept: PHARMACY | Facility: HOSPITAL | Age: 71
End: 2021-03-29

## 2021-03-29 DIAGNOSIS — Z95.2 S/P TAVR (TRANSCATHETER AORTIC VALVE REPLACEMENT): ICD-10-CM

## 2021-03-29 DIAGNOSIS — T82.09XD PROSTHETIC VALVE DYSFUNCTION, SUBSEQUENT ENCOUNTER: ICD-10-CM

## 2021-03-29 LAB — INR PPP: 2

## 2021-03-29 NOTE — PROGRESS NOTES
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    Anticoagulation Clinic INR History:  Date 10/28 11/9 11/12 11/16 11/19 11/23 11/30 12/3-6 12/7 12/11 12/15 12/18   Total WeeklyDose 0mg- baseline 25mg 37.5 mg 32.5mg 32.5mg 42.5mg 40mg [facility] admit 52.5mg  ?? 50mg  ?? 35mg 45mg   INR 0.9 1.5 2.18 1.65 1.6 2.27 1.82  2.07 1.37 1.50 1.62   Notes 1st clinic COVID hold x2; decr appetite Sick;   incr ASA;  no GLV; lactulose x3 days  Boost daily   hold / procedure recv'd 12/8; doxy resume Ensure,   1x hold; doxy       Date 12/22 12/29 1/5/21 1/11 1/18 1/25 2/1 2/8 2/15 2/22 3/5 3/12   Total WeeklyDose 47.5mg 47.5mg 52.5mg 60mg 57.5mg 55mg 50mg 60mg 57.5mg 57.5mg 57.5mg 57.5mg   INR 1.88 1.60 1.59 2.58 3.06 2.12 1.54 2.52 2.43 2.84 3.0 3.2   Notes       1x miss 1x incr dose recv'd 2/22   training      Date 3/19 3/29             Total WeeklyDose 55mg 45 mg             INR 3.6 2.0             Notes 1x decr dose; [facility]               Phone Interview:  Tablet Strength: 1mg, 2mg, and 5mg  Estimated OOP cost: ~$5  Verbal Release Authorization signed on 10/28/2020-- may speak with Norm White (sons) and Lashanda Matthew (sister)  Patient contact: ok to [facility] day time [phone]; afternoon: [phone]  Lab contact: Jean-Paul PETER    Patient Findings:  Positives:  Missed doses   Negatives:  Signs/symptoms of thrombosis, Signs/symptoms of bleeding, Laboratory test error suspected, Change in health, Change in alcohol use, Change in  activity, Upcoming invasive procedure, Emergency department visit, Upcoming dental procedure, Extra doses, Change in medications, Change in diet/appetite, Hospital admission, Bruising, Other complaints   Comments:  Missed her dose last night completely and on Saturday took 7.5 mg versus intended 10 mg. Patient was overdue to check. Still drinking one Boost per day and denies any other changes.     Plan:  1. INR is back WNL today at [facility]. Patient was non compliant with requested follow up. Instructed Ms. Hernandez take 10 mg tonight then continue warfarin 7.5 mg daily until recheck.  2. Repeat INR in one week. Patient will self test weekly on HM for at least 4 weeks to ensure compliance. Approx 4/2/21 encounter patient will be eligible to resume 2-4x a month as appropriate.  3. Verbal information provided over the phone. Albania Hernandez expresses understanding by teach back and has no further questions at this time.    Samanta Ko, GIO  3/29/2021  14:11 EDT  \    I, Chata Holguin, PharmD, have reviewed the note in full and agree with the assessment and plan.  03/29/21  16:11 EDT

## 2021-04-05 ENCOUNTER — ANTICOAGULATION VISIT (OUTPATIENT)
Dept: PHARMACY | Facility: HOSPITAL | Age: 71
End: 2021-04-05

## 2021-04-05 DIAGNOSIS — Z95.2 S/P TAVR (TRANSCATHETER AORTIC VALVE REPLACEMENT): ICD-10-CM

## 2021-04-05 DIAGNOSIS — T82.09XD PROSTHETIC VALVE DYSFUNCTION, SUBSEQUENT ENCOUNTER: ICD-10-CM

## 2021-04-05 LAB — INR PPP: 2.1

## 2021-04-05 NOTE — PROGRESS NOTES
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 50mg  ?? 35mg 45mg   INR 0.9 1.5 2.18 1.65 1.6 2.27 1.82  2.07 1.37 1.50 1.62   Notes 1st clinic COVID hold x2; decr appetite Sick;   incr ASA;  no GLV; lactulose x3 days  Boost daily   hold / procedure recv'd 12/8; doxy resume Ensure,   1x hold; doxy       Date 12/22 12/29 1/5/21 1/11 1/18 1/25 2/1 2/8 2/15 2/22 3/5 3/12   Total WeeklyDose 47.5mg 47.5mg 52.5mg 60mg 57.5mg 55mg 50mg 60mg 57.5mg 57.5mg 57.5mg 57.5mg   INR 1.88 1.60 1.59 2.58 3.06 2.12 1.54 2.52 2.43 2.84 3.0 3.2   Notes       1x miss 1x incr dose recv'd 2/22   training      Date 3/19 3/29 4/5            Total WeeklyDose 55mg 45 mg 55 mg            INR 3.6 2.0 2.1            Notes 1x decr dose;  HM; 1x miss               Phone Interview:  Tablet Strength: 1mg, 2mg, and 5mg  Estimated OOP cost: ~$5  Verbal Release Authorization signed on 10/28/2020-- may speak with Norm White (sons) and Lashanda Matthew (sister)  Patient contact: ok to [facility] day time [phone]; afternoon: [phone]  Lab contact: Jean-Paul PETER    Patient Findings:  Negatives:  Signs/symptoms of thrombosis, Signs/symptoms of bleeding, Laboratory test error suspected, Change in health, Change in alcohol use, Change in activity,  Upcoming invasive procedure, Emergency department visit, Upcoming dental procedure, Missed doses, Extra doses, Change in medications, Change in diet/appetite, Hospital admission, Bruising, Other complaints   Comments:  Ms. Hernandez denies any changes since last encounter.     Plan:  1. INR is therapeutic again today. Instructed Ms. Hernandez to take warfarin 7.5 mg daily except 10 mg on Monday until recheck.  2. Repeat INR in one week.  3. Verbal information provided over the phone. Albania Hernandez expresses understanding by teach back and has no further questions at this time.    Samanta Ko, GIO  4/5/2021  13:48 EDT      I, Chata Holguin, PharmD, have reviewed the note in full and agree with the assessment and plan.  04/05/21  15:24 EDT

## 2021-04-12 ENCOUNTER — ANTICOAGULATION VISIT (OUTPATIENT)
Dept: PHARMACY | Facility: HOSPITAL | Age: 71
End: 2021-04-12

## 2021-04-12 DIAGNOSIS — T82.09XD PROSTHETIC VALVE DYSFUNCTION, SUBSEQUENT ENCOUNTER: ICD-10-CM

## 2021-04-12 DIAGNOSIS — Z95.2 S/P TAVR (TRANSCATHETER AORTIC VALVE REPLACEMENT): ICD-10-CM

## 2021-04-12 LAB — INR PPP: 2.4

## 2021-04-12 NOTE — PROGRESS NOTES
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 50mg  ?? 35mg 45mg   INR 0.9 1.5 2.18 1.65 1.6 2.27 1.82  2.07 1.37 1.50 1.62   Notes 1st clinic COVID hold x2; decr appetite Sick;   incr ASA;  no GLV; lactulose x3 days  Boost daily   hold / procedure recv'd 12/8; doxy resume Ensure,   1x hold; doxy       Date 12/22 12/29 1/5/21 1/11 1/18 1/25 2/1 2/8 2/15 2/22 3/5 3/12   Total WeeklyDose 47.5mg 47.5mg 52.5mg 60mg 57.5mg 55mg 50mg 60mg 57.5mg 57.5mg 57.5mg 57.5mg   INR 1.88 1.60 1.59 2.58 3.06 2.12 1.54 2.52 2.43 2.84 3.0 3.2   Notes       1x miss 1x incr dose recv'd 2/22   training      Date 3/19 3/29 4/5 4/12           Total WeeklyDose 55mg 45 mg 55 mg 55 mg           INR 3.6 2.0 2.1 2.4           Notes 1x decr dose;  HM; 1x miss               Phone Interview:  Tablet Strength: 1mg, 2mg, and 5mg  Estimated OOP cost: ~$5  Verbal Release Authorization signed on 10/28/2020-- may speak with Norm White (sons) and Lashanda Matthew (sister)  Patient contact: ok to [facility] day time [phone]; afternoon: [phone]  Lab contact: Jean-Paul PETER      Negatives:  Signs/symptoms of thrombosis, Signs/symptoms of bleeding, Laboratory test error suspected, Change in health, Change in alcohol use, Change in activity, Upcoming  invasive procedure, Emergency department visit, Upcoming dental procedure, Missed doses, Extra doses, Change in medications, Change in diet/appetite, Hospital admission, Bruising, Other complaints         Plan:  1. INR is therapeutic again today. Instructed Ms. Hernandez to continue recently increased dose of warfarin 7.5 mg daily except 10 mg on Monday until recheck.  2. Repeat INR in in 2 weeks  3. Verbal information provided over the phone. Albania Hernandez expresses understanding by teach back and has no further questions at this time.      Chata Holguin, PharmD.  04/12/21   14:25 EDT

## 2021-04-21 ENCOUNTER — OFFICE VISIT (OUTPATIENT)
Dept: CARDIOLOGY | Facility: CLINIC | Age: 71
End: 2021-04-21

## 2021-04-21 ENCOUNTER — HOSPITAL ENCOUNTER (OUTPATIENT)
Dept: CARDIOLOGY | Facility: HOSPITAL | Age: 71
Discharge: HOME | End: 2021-04-21
Admitting: INTERNAL MEDICINE

## 2021-04-21 VITALS
WEIGHT: 136 LBS | BODY MASS INDEX: 21.86 KG/M2 | DIASTOLIC BLOOD PRESSURE: 99 MMHG | SYSTOLIC BLOOD PRESSURE: 155 MMHG | HEIGHT: 66 IN

## 2021-04-21 VITALS
HEIGHT: 66 IN | OXYGEN SATURATION: 95 % | SYSTOLIC BLOOD PRESSURE: 134 MMHG | BODY MASS INDEX: 22.66 KG/M2 | WEIGHT: 141 LBS | HEART RATE: 93 BPM | DIASTOLIC BLOOD PRESSURE: 82 MMHG

## 2021-04-21 DIAGNOSIS — T82.09XD PROSTHETIC VALVE DYSFUNCTION, SUBSEQUENT ENCOUNTER: ICD-10-CM

## 2021-04-21 DIAGNOSIS — Z95.2 S/P TAVR (TRANSCATHETER AORTIC VALVE REPLACEMENT): ICD-10-CM

## 2021-04-21 DIAGNOSIS — I35.0 SEVERE AORTIC STENOSIS: Primary | ICD-10-CM

## 2021-04-21 LAB
ASCENDING AORTA: 3.8 CM
BH CV ECHO MEAS - AO MAX PG (FULL): 34.8 MMHG
BH CV ECHO MEAS - AO MAX PG: 29 MMHG
BH CV ECHO MEAS - AO MEAN PG (FULL): 22 MMHG
BH CV ECHO MEAS - AO MEAN PG: 17 MMHG
BH CV ECHO MEAS - AO ROOT AREA (BSA CORRECTED): 1.7
BH CV ECHO MEAS - AO ROOT AREA: 6.6 CM^2
BH CV ECHO MEAS - AO ROOT DIAM: 2.9 CM
BH CV ECHO MEAS - AO V2 MAX: 269 CM/SEC
BH CV ECHO MEAS - AO V2 MEAN: 228 CM/SEC
BH CV ECHO MEAS - AO V2 VTI: 64 CM
BH CV ECHO MEAS - ASC AORTA: 3.8 CM
BH CV ECHO MEAS - AVA(I,A): 1.1 CM^2
BH CV ECHO MEAS - AVA(I,D): 1.3 CM^2
BH CV ECHO MEAS - AVA(V,A): 1.1 CM^2
BH CV ECHO MEAS - AVA(V,D): 1.1 CM^2
BH CV ECHO MEAS - BSA(HAYCOCK): 1.7 M^2
BH CV ECHO MEAS - BSA: 1.7 M^2
BH CV ECHO MEAS - BZI_BMI: 22 KILOGRAMS/M^2
BH CV ECHO MEAS - BZI_METRIC_HEIGHT: 167.6 CM
BH CV ECHO MEAS - BZI_METRIC_WEIGHT: 61.7 KG
BH CV ECHO MEAS - EDV(CUBED): 38.6 ML
BH CV ECHO MEAS - EDV(MOD-SP2): 92 ML
BH CV ECHO MEAS - EDV(MOD-SP4): 108 ML
BH CV ECHO MEAS - EDV(TEICH): 46.8 ML
BH CV ECHO MEAS - EF(CUBED): 67.7 %
BH CV ECHO MEAS - EF(MOD-BP): 58 %
BH CV ECHO MEAS - EF(MOD-SP2): 59.8 %
BH CV ECHO MEAS - EF(MOD-SP4): 57.4 %
BH CV ECHO MEAS - EF(TEICH): 60.4 %
BH CV ECHO MEAS - ESV(CUBED): 12.5 ML
BH CV ECHO MEAS - ESV(MOD-SP2): 37 ML
BH CV ECHO MEAS - ESV(MOD-SP4): 46 ML
BH CV ECHO MEAS - ESV(TEICH): 18.5 ML
BH CV ECHO MEAS - FS: 31.4 %
BH CV ECHO MEAS - IVS/LVPW: 1
BH CV ECHO MEAS - IVSD: 1.2 CM
BH CV ECHO MEAS - LA DIMENSION: 4 CM
BH CV ECHO MEAS - LA/AO: 1.4
BH CV ECHO MEAS - LV DIASTOLIC VOL/BSA (35-75): 63.6 ML/M^2
BH CV ECHO MEAS - LV MASS(C)D: 123.4 GRAMS
BH CV ECHO MEAS - LV MASS(C)DI: 72.7 GRAMS/M^2
BH CV ECHO MEAS - LV MAX PG: 3.6 MMHG
BH CV ECHO MEAS - LV MEAN PG: 2 MMHG
BH CV ECHO MEAS - LV SYSTOLIC VOL/BSA (12-30): 27.1 ML/M^2
BH CV ECHO MEAS - LV V1 MAX: 95.2 CM/SEC
BH CV ECHO MEAS - LV V1 MEAN: 67.3 CM/SEC
BH CV ECHO MEAS - LV V1 VTI: 23.9 CM
BH CV ECHO MEAS - LVIDD: 3.4 CM
BH CV ECHO MEAS - LVIDS: 2.3 CM
BH CV ECHO MEAS - LVLD AP2: 8.1 CM
BH CV ECHO MEAS - LVLD AP4: 8.5 CM
BH CV ECHO MEAS - LVLS AP2: 6.7 CM
BH CV ECHO MEAS - LVLS AP4: 7.2 CM
BH CV ECHO MEAS - LVOT AREA (M): 3.5 CM^2
BH CV ECHO MEAS - LVOT AREA: 3.5 CM^2
BH CV ECHO MEAS - LVOT DIAM: 2.1 CM
BH CV ECHO MEAS - LVPWD: 1.2 CM
BH CV ECHO MEAS - SI(AO): 298.4 ML/M^2
BH CV ECHO MEAS - SI(CUBED): 15.4 ML/M^2
BH CV ECHO MEAS - SI(LVOT): 48.8 ML/M^2
BH CV ECHO MEAS - SI(MOD-SP2): 32.4 ML/M^2
BH CV ECHO MEAS - SI(MOD-SP4): 36.5 ML/M^2
BH CV ECHO MEAS - SI(TEICH): 16.6 ML/M^2
BH CV ECHO MEAS - SV(AO): 506.6 ML
BH CV ECHO MEAS - SV(CUBED): 26.1 ML
BH CV ECHO MEAS - SV(LVOT): 82.8 ML
BH CV ECHO MEAS - SV(MOD-SP2): 55 ML
BH CV ECHO MEAS - SV(MOD-SP4): 62 ML
BH CV ECHO MEAS - SV(TEICH): 28.2 ML
BH CV ECHO MEAS - TR MAX PG: 19 MMHG
BH CV ECHO MEAS - TR MAX VEL: 216 CM/SEC
BH CV VAS BP LEFT ARM: NORMAL MMHG
LV EF 2D ECHO EST: 55 %
MAXIMAL PREDICTED HEART RATE: 150 BPM
STRESS TARGET HR: 128 BPM

## 2021-04-21 PROCEDURE — 93321 DOPPLER ECHO F-UP/LMTD STD: CPT

## 2021-04-21 PROCEDURE — 93308 TTE F-UP OR LMTD: CPT

## 2021-04-21 PROCEDURE — 99213 OFFICE O/P EST LOW 20 MIN: CPT | Performed by: INTERNAL MEDICINE

## 2021-04-21 PROCEDURE — 93325 DOPPLER ECHO COLOR FLOW MAPG: CPT

## 2021-04-21 PROCEDURE — 93325 DOPPLER ECHO COLOR FLOW MAPG: CPT | Performed by: INTERNAL MEDICINE

## 2021-04-21 PROCEDURE — 93321 DOPPLER ECHO F-UP/LMTD STD: CPT | Performed by: INTERNAL MEDICINE

## 2021-04-21 PROCEDURE — 93308 TTE F-UP OR LMTD: CPT | Performed by: INTERNAL MEDICINE

## 2021-04-21 NOTE — PROGRESS NOTES
[facility] Cardiology    Patient ID: Albania Hernandez is a 70 y.o. female.  : 1950   Contact: [phone]    Encounter date: 2021    PCP: Kimberly Anglin FNP      Chief complaint:   Chief Complaint   Patient presents with   • Aortic Stenosis   • Aortic valve disorder   • Coronary Artery Disease   • TAVR     Problem List:  1. Aortic valve disease:  a. C, 2019, [facility]: Nonobstructive CAD with 20-30% mid LAD lesion   b. Echo, 10/31/2019: Calcified AV, severe AS with BALDO 0.62cm2, mean gradient 70mmHg, max gradient 120mmHg, trace AI. Mild LVH, EF 55-60%, mild MR.   c. Class II VELAZQUEZ  d. SAW, 2019: Severe AS, mean gradient 71.2 mmHg, BALDO 0.5 cm2.  e. S/p TAVR, 2019, with 23 mm Garcia Antoni 3 pericardial prosthesis. Intra-op TAVR SAW showed mean PG 9 mmHg with BALDO 1.9 cm2.  f. Echo, 2020: EF 60%. S/p TAVR with 23 mm Garcia Antoni 3 pericardial prosthesis. Mean PG 15.0 mmHg, BALDO 1.2 cm2.  g. Echo, 2020: S/p TAVR with 23 mm Garcia Antoni 3 pericardial prosthesis. Mean PG 16 mmHg.   h. Echo, 10/28/2020: EF: 55%, There is a 23 mm Antoni 3 TAVR present. Peak velocity of the flow distal to the aortic valve is 341 cm/s. Aortic valve maximum pressure gradient is 46 mmHg. Aortic valve mean pressure gradient is 25 mmHg. Trace MR.   i. On Coumadin due to elevated gradients.   j. Echo 21: 17 mmHg per [facility]. Final report pending.  2. History of hiatal hernia, s/p repair   3. Arthritis  4. Osteoporosis      No Known Allergies    Current Medications:    Current Outpatient Medications:   •  ASPIRIN 81 PO, Take 81 mg by mouth Daily., Disp: , Rfl:   •  calcium carbonate (OS-SHAINA) 600 MG tablet, Take 600 mg by mouth Daily., Disp: , Rfl:   •  fexofenadine (ALLEGRA) 180 MG tablet, Take 180 mg by mouth Daily., Disp: , Rfl:   •  warfarin (Coumadin) 5 MG tablet, Take 1.5-2 tablets by mouth daily or as directed by the anticoagulation clinic., Disp: 150  "tablet, Rfl: 0      HPI    Albania Hernandez is a 70 y.o. female who presents today for a follow up of severe aortic stenosis. Since last visit, patient has done well overall. She denies any chest pain, shortness of breath, lower extremity edema, PND, orthopnea, near syncope, syncope, exertional dizziness/lightheadedness. INRs have been therapeutic since February 2021. She denies any issues with major bleeding/bruising. She had an echocardiogram today which demonstrated improvement in gradient (17 mmHg vs. 25 mmHg).         The following portions of the patient's history were reviewed and updated as appropriate: allergies, current medications and problem list.    Pertinent positives as listed in the HPI.  All other systems reviewed are negative.       Vitals:    04/21/21 1451   BP: 134/82   Pulse: 93   SpO2: 95%   Weight: 64 kg (141 lb)   Height: 167.6 cm (65.98\")     Physical Exam:  General: Alert and oriented.  Neck: Jugular venous pressure is within normal limits. Carotids have normal upstrokes without bruits.   Cardiovascular: Heart has a nondisplaced focal PMI. Regular rate and rhythm. No murmur, gallop or rub.  Lungs: Clear, no rales or wheezes. Equal expansion is noted.   Extremities: Show no edema.  Skin: Warm and dry.  Neurologic: Nonfocal.     Diagnostic Data (reviewed with patient):     Procedures      Assessment:    ICD-10-CM ICD-9-CM   1. Severe aortic stenosis  I35.0 424.1         Plan:  1. Continue Coumadin, ASA for elevated mean pressure gradients, s/p TAVR.   2. Repeat echo in 6 months (same day as f/u) to follow up on aortic valve gradient.   3. Continue all other current medications.  4. Patient was counseled to begin aerobic exercise 30 min per day for at least 4 days per week.   5. F/up in 6 months, sooner if needed.      Electronically signed by DORA Tapia, 04/21/21, 3:22 PM EDT.              "

## 2021-04-26 ENCOUNTER — ANTICOAGULATION VISIT (OUTPATIENT)
Dept: PHARMACY | Facility: HOSPITAL | Age: 71
End: 2021-04-26

## 2021-04-26 DIAGNOSIS — T82.09XD PROSTHETIC VALVE DYSFUNCTION, SUBSEQUENT ENCOUNTER: Primary | ICD-10-CM

## 2021-04-26 DIAGNOSIS — Z95.2 S/P TAVR (TRANSCATHETER AORTIC VALVE REPLACEMENT): ICD-10-CM

## 2021-04-26 LAB — INR PPP: 2.8

## 2021-04-26 NOTE — PROGRESS NOTES
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 50mg  ?? 35mg 45mg   INR 0.9 1.5 2.18 1.65 1.6 2.27 1.82  2.07 1.37 1.50 1.62   Notes 1st clinic COVID hold x2; decr appetite Sick;   incr ASA;  no GLV; lactulose x3 days  Boost daily   hold / procedure recv'd 12/8; doxy resume Ensure,   1x hold; doxy       Date 12/22 12/29 1/5/21 1/11 1/18 1/25 2/1 2/8 2/15 2/22 3/5 3/12   Total WeeklyDose 47.5mg 47.5mg 52.5mg 60mg 57.5mg 55mg 50mg 60mg 57.5mg 57.5mg 57.5mg 57.5mg   INR 1.88 1.60 1.59 2.58 3.06 2.12 1.54 2.52 2.43 2.84 3.0 3.2   Notes       1x miss 1x incr dose recv'd 2/22   training      Date 3/19 3/29 4/5 4/12 4/26          Total WeeklyDose 55mg 45 mg 55 mg 55 mg 55 mg          INR 3.6 2.0 2.1 2.4 2.8          Notes 1x decr dose;  HM; 1x miss               Phone Interview:  Tablet Strength: 1mg, 2mg, and 5mg  Estimated OOP cost: ~$5  Verbal Release Authorization signed on 10/28/2020-- may speak with Norm White (sons) and Lashanda Matthew (sister)  Patient contact: ok to M day time [phone]; afternoon: [phone]  Lab contact: Jean-Paul PETER    Patient Findings:  Negatives:  Signs/symptoms of thrombosis, Signs/symptoms of bleeding, Laboratory test error suspected, Change in health, Change in alcohol  use, Change in activity, Upcoming invasive procedure, Emergency department visit, Upcoming dental procedure, Missed doses, Extra doses, Change in medications, Change in diet/appetite, Hospital admission, Bruising, Other complaints   Comments:  Saw Dr. Rodney on 4/21. No changes. Per her note:       Plan:   1. Continue Coumadin, ASA for elevated mean pressure gradients, s/p TAVR.   2. Repeat echo in 6 months (same day as f/u) to follow up on aortic valve gradient.   3. Continue all other current medications.   4. Patient was counseled to begin aerobic exercise 30 min per day for at least 4 days per week.   5. F/up in 6 months, sooner if needed.     Plan:  1. INR is therapeutic again today. Instructed Ms. Hernandez to continue recently increased maintenance dose of warfarin 7.5 mg daily except 10 mg on Monday until recheck.  2. Repeat INR in in two weeks.  3. Verbal information provided over the phone. Albania Hernandez expresses understanding by teach back and has no further questions at this time.    Samanta Ko CPhT  4/26/2021  10:46 EDT     I, Chata Holguin, PharmD, have reviewed the note in full and agree with the assessment and plan.  04/26/21  16:26 EDT

## 2021-04-29 ENCOUNTER — TELEPHONE (OUTPATIENT)
Dept: CARDIOLOGY | Facility: CLINIC | Age: 71
End: 2021-04-29

## 2021-04-29 NOTE — TELEPHONE ENCOUNTER
Talked with Dr. Rodney regarding potential dental procedure- she will not need to bridge with Lovenox.

## 2021-05-10 ENCOUNTER — ANTICOAGULATION VISIT (OUTPATIENT)
Dept: PHARMACY | Facility: HOSPITAL | Age: 71
End: 2021-05-10

## 2021-05-10 DIAGNOSIS — Z95.2 S/P TAVR (TRANSCATHETER AORTIC VALVE REPLACEMENT): ICD-10-CM

## 2021-05-10 DIAGNOSIS — T82.09XD PROSTHETIC VALVE DYSFUNCTION, SUBSEQUENT ENCOUNTER: Primary | ICD-10-CM

## 2021-05-10 LAB — INR PPP: 2.7

## 2021-05-10 NOTE — PROGRESS NOTES
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 50mg  ?? 35mg 45mg   INR 0.9 1.5 2.18 1.65 1.6 2.27 1.82  2.07 1.37 1.50 1.62   Notes 1st clinic COVID hold x2; decr appetite Sick;   incr ASA;  no GLV; lactulose x3 days  Boost daily   hold / procedure recv'd 12/8; doxy resume Ensure,   1x hold; doxy       Date 12/22 12/29 1/5/21 1/11 1/18 1/25 2/1 2/8 2/15 2/22 3/5 3/12   Total WeeklyDose 47.5mg 47.5mg 52.5mg 60mg 57.5mg 55mg 50mg 60mg 57.5mg 57.5mg 57.5mg 57.5mg   INR 1.88 1.60 1.59 2.58 3.06 2.12 1.54 2.52 2.43 2.84 3.0 3.2   Notes       1x miss 1x incr dose recv'd 2/22   training      Date 3/19 3/29 4/5 4/12 4/26 5/10         Total WeeklyDose 55mg 45 mg 55 mg 55 mg 55 mg 55 mg         INR 3.6 2.0 2.1 2.4 2.8 2.7         Notes 1x decr dose; HM HM; 1x miss               Phone Interview:  Tablet Strength: 1mg, 2mg, and 5mg  Estimated OOP cost: ~$5  Verbal Release Authorization signed on 10/28/2020-- may speak with Norm White (sons) and Lashanda Matthew (sister)  Patient contact: ok to M day time [phone]; afternoon: [phone]  Lab contact: Jean-Paul PETER    Patient Findings:  Positives:  Upcoming dental procedure   Negatives:  Signs/symptoms of thrombosis, Signs/symptoms of bleeding, Laboratory test  error suspected, Change in health, Change in alcohol use, Change in activity, Upcoming invasive procedure, Emergency department visit, Missed doses, Extra doses, Change in medications, Change in diet/appetite, Hospital admission, Bruising, Other complaints   Comments:  Upcoming tooth filling and cleaning on 6/1/21 at [facility], Iris Staton DMD. ([phone])     Have verified with DMD office that Ms. Hernandez will be getting a temporary filling and therefore does not need to hold warfarin.     Plan:  1. INR is therapeutic again today. Instructed Ms. Hernandez to continue maintenance dose of warfarin 7.5 mg daily except 10 mg on Monday until recheck.  2. Repeat INR in in two weeks.  3. Verbal information provided over the phone. Albania Hernandez expresses understanding by teach back and has no further questions at this time.    Samanta Ko, Nii  5/10/2021  13:47 EDT     I, Chata Holguin, PharmD, have reviewed the note in full and agree with the assessment and plan.  05/11/21  16:01 EDT

## 2021-05-24 ENCOUNTER — ANTICOAGULATION VISIT (OUTPATIENT)
Dept: PHARMACY | Facility: HOSPITAL | Age: 71
End: 2021-05-24

## 2021-05-24 DIAGNOSIS — T82.09XD PROSTHETIC VALVE DYSFUNCTION, SUBSEQUENT ENCOUNTER: Primary | ICD-10-CM

## 2021-05-24 DIAGNOSIS — Z95.2 S/P TAVR (TRANSCATHETER AORTIC VALVE REPLACEMENT): ICD-10-CM

## 2021-05-24 LAB — INR PPP: 2.4

## 2021-05-24 NOTE — PROGRESS NOTES
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 50mg  ?? 35mg 45mg   INR 0.9 1.5 2.18 1.65 1.6 2.27 1.82  2.07 1.37 1.50 1.62   Notes 1st clinic COVID hold x2; decr appetite Sick;   incr ASA;  no GLV; lactulose x3 days  Boost daily   hold / procedure recv'd 12/8; doxy resume Ensure,   1x hold; doxy       Date 12/22 12/29 1/5/21 1/11 1/18 1/25 2/1 2/8 2/15 2/22 3/5 3/12   Total WeeklyDose 47.5mg 47.5mg 52.5mg 60mg 57.5mg 55mg 50mg 60mg 57.5mg 57.5mg 57.5mg 57.5mg   INR 1.88 1.60 1.59 2.58 3.06 2.12 1.54 2.52 2.43 2.84 3.0 3.2   Notes       1x miss 1x incr dose recv'd 2/22   training      Date 3/19 3/29 4/5 4/12 4/26 5/10 5/24        Total WeeklyDose 55mg 45 mg 55 mg 55 mg 55 mg 55 mg 55 mg        INR 3.6 2.0 2.1 2.4 2.8 2.7 2.4        Notes 1x decr dose; HM HM; 1x miss               Phone Interview:  Tablet Strength: 1mg, 2mg, and 5mg  Estimated OOP cost: ~$5  Verbal Release Authorization signed on 10/28/2020-- may speak with Norm White (sons) and Lashanda Matthew (sister)  Patient contact: ok to M day time [phone]; afternoon: [phone]  Lab contact: Jean-Paul PETER    Patient Findings:  Positives:  Upcoming dental procedure   Negatives:  Signs/symptoms of thrombosis, Signs/symptoms of bleeding,  Laboratory test error suspected, Change in health, Change in alcohol use, Change in activity, Upcoming invasive procedure, Emergency department visit, Missed doses, Extra doses, Change in medications, Change in diet/appetite, Hospital admission, Bruising, Other complaints   Comments:  Will be having a temporary filling on 6/1/21. Have previously verified that patient does not have to hold warfarin. She does report she will need to take an antibiotic prior to dental appointment but is not sure the name of medication yet.     Plan:  1. INR is therapeutic again today. Instructed Ms. Hernandez to continue maintenance dose of warfarin 7.5 mg daily except 10 mg on Mondays until recheck.  2. Repeat INR in in ~ two weeks, 6/4. Patient will be flying to [place] on 6/7.  3. Verbal information provided over the phone. Albania Hernandez expresses understanding by teach back and has no further questions at this time.    Samanta Ko CPhT  5/24/2021  13:22 EDT     I, Chata Holguin, PharmD, have reviewed the note in full and agree with the assessment and plan.  05/25/21  08:57 EDT

## 2021-06-01 DIAGNOSIS — I35.9 AORTIC VALVE DISORDER: Primary | ICD-10-CM

## 2021-06-01 RX ORDER — WARFARIN SODIUM 5 MG/1
TABLET ORAL
Qty: 150 TABLET | Refills: 0 | Status: SHIPPED | OUTPATIENT
Start: 2021-06-01 | End: 2021-07-09 | Stop reason: SDUPTHER

## 2021-06-04 ENCOUNTER — ANTICOAGULATION VISIT (OUTPATIENT)
Dept: PHARMACY | Facility: HOSPITAL | Age: 71
End: 2021-06-04

## 2021-06-04 DIAGNOSIS — T82.09XD PROSTHETIC VALVE DYSFUNCTION, SUBSEQUENT ENCOUNTER: Primary | ICD-10-CM

## 2021-06-04 DIAGNOSIS — Z95.2 S/P TAVR (TRANSCATHETER AORTIC VALVE REPLACEMENT): ICD-10-CM

## 2021-06-04 LAB — INR PPP: 2.6

## 2021-06-04 NOTE — PROGRESS NOTES
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 50mg  ?? 35mg 45mg   INR 0.9 1.5 2.18 1.65 1.6 2.27 1.82  2.07 1.37 1.50 1.62   Notes 1st clinic COVID hold x2; decr appetite Sick;   incr ASA;  no GLV; lactulose x3 days  Boost daily   hold / procedure recv'd 12/8; doxy resume Ensure,   1x hold; doxy       Date 12/22 12/29 1/5/21 1/11 1/18 1/25 2/1 2/8 2/15 2/22 3/5 3/12   Total WeeklyDose 47.5mg 47.5mg 52.5mg 60mg 57.5mg 55mg 50mg 60mg 57.5mg 57.5mg 57.5mg 57.5mg   INR 1.88 1.60 1.59 2.58 3.06 2.12 1.54 2.52 2.43 2.84 3.0 3.2   Notes       1x miss 1x incr dose recv'd 2/22   training      Date 3/19 3/29 4/5 4/12 4/26 5/10 5/24 6/4       Total WeeklyDose 55mg 45 mg 55 mg 55 mg 55 mg 55 mg 55 mg 55 mg       INR 3.6 2.0 2.1 2.4 2.8 2.7 2.4 2.6       Notes 1x decr dose; HM HM; 1x miss               Phone Interview:  Tablet Strength: 1mg, 2mg, and 5mg  Estimated OOP cost: ~$5  Verbal Release Authorization signed on 10/28/2020-- may speak with Norm White (sons) and Lashanda Vipul (sister)  Patient contact: ok to [facility] day time [phone]; afternoon: [phone]  Lab contact: Jean-Paul PETER    Patient Findings    Positives:  Upcoming dental procedure   Negatives:  Signs/symptoms of thrombosis, Signs/symptoms of  bleeding, Laboratory test error suspected, Change in health, Change in alcohol use, Change in activity, Upcoming invasive procedure, Emergency department visit, Missed doses, Extra doses, Change in medications, Change in diet/appetite, Hospital admission, Bruising, Other complaints   Comments:  Temporary filling yesterday 6/3.   She has an ABX amoxicillin from the dmd if needed.     DMD = Iris Staton, DMD in [facility] in [place], [place]; for cleaning and perm filling on 6/23, for which she will need to be off for three days. Need to clarify and verify with Dr. Staton and coordinate with       Plan:  1. INR is therapeutic again today at 2.6. Instructed Ms. Hernandez to continue maintenance dose of warfarin 7.5 mg daily except 10 mg on Mondays until recheck.  2. Repeat INR in two weeks. 6/18  3. Verbal information provided over the phone. Albania Hernandez expresses understanding by teach back and has no further questions at this time.    Myke Patrick, [facility]  6/4/2021  14:21 EDT     6/7:  Called the dental clinic of [facility] in [place], [place]; she would require enoxaparin injections and they will not have her hold her warfarin before the cleaning and permanent filling on 6/23. [phone].

## 2021-06-18 ENCOUNTER — ANTICOAGULATION VISIT (OUTPATIENT)
Dept: PHARMACY | Facility: HOSPITAL | Age: 71
End: 2021-06-18

## 2021-06-18 ENCOUNTER — TELEPHONE (OUTPATIENT)
Dept: PHARMACY | Facility: HOSPITAL | Age: 71
End: 2021-06-18

## 2021-06-18 DIAGNOSIS — Z95.2 S/P TAVR (TRANSCATHETER AORTIC VALVE REPLACEMENT): ICD-10-CM

## 2021-06-18 DIAGNOSIS — T82.09XD PROSTHETIC VALVE DYSFUNCTION, SUBSEQUENT ENCOUNTER: Primary | ICD-10-CM

## 2021-06-18 LAB — INR PPP: 3.7

## 2021-06-18 NOTE — PROGRESS NOTES
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 50mg  ?? 35mg 45mg   INR 0.9 1.5 2.18 1.65 1.6 2.27 1.82  2.07 1.37 1.50 1.62   Notes 1st clinic COVID hold x2; decr appetite Sick;   incr ASA;  no GLV; lactulose x3 days  Boost daily   hold / procedure recv'd 12/8; doxy resume Ensure,   1x hold; doxy       Date 12/22 12/29 1/5/21 1/11 1/18 1/25 2/1 2/8 2/15 2/22 3/5 3/12   Total WeeklyDose 47.5mg 47.5mg 52.5mg 60mg 57.5mg 55mg 50mg 60mg 57.5mg 57.5mg 57.5mg 57.5mg   INR 1.88 1.60 1.59 2.58 3.06 2.12 1.54 2.52 2.43 2.84 3.0 3.2   Notes       1x miss 1x incr dose recv'd 2/22   training      Date 3/19 3/29 4/5 4/12 4/26 5/10 5/24 6/4 6/18      Total WeeklyDose 55mg 45 mg 55 mg 55 mg 55 mg 55 mg 55 mg 55 mg 55 mg      INR 3.6 2.0 2.1 2.4 2.8 2.7 2.4 2.6 3.7      Notes 1x decr dose; HM HM; 1x miss               Phone Interview:  Tablet Strength: 1mg, 2mg, and 5mg  Estimated OOP cost: ~$5  Verbal Release Authorization signed on 10/28/2020-- may speak with Norm White (sons) and Lashanda Quinonescharlee (sister)  Patient contact: ok to [facility] day time [phone]; afternoon: [phone]  Lab contact: Jean-Paul PETER    Patient Findings  Positives:  Change in diet/appetite   Negatives:  Signs/symptoms of thrombosis, Signs/symptoms  of bleeding, Laboratory test error suspected, Change in health, Change in alcohol use, Change in activity, Upcoming invasive procedure, Emergency department visit, Upcoming dental procedure, Missed doses, Extra doses, Change in medications, Hospital admission, Bruising, Other complaints   Comments:  She returns from [place], and may be a little mixed over the time difference.   This was a planned trip for last year but unable to go because of pandemic, reshceulde for this year.   She didn't have BOOST while on [place].      Plan:  1. INR is supratherapeutic today at 3.7.   I mention not having to hold for the DMD appointment and she still believes that she has to hold, so she will reach out to the clinic in [place] to see.....standby calling us back. She says they want a two day hold and approval from Dr. Rodney for the hold; sent a message to Dr. Rodney. If approved please fax to [phone].   Instructed Ms. Hernandez to reduce today's dose (6/18) to warfarin 5 mg and then on 6/19 resume maintenance dose of warfarin 7.5 mg oral daily until recheck.  Perioperative plan approved by Dr. Rdoney.  Samanta will review with Ms. Hernandez.  [facility] on 6/21 instructing Ms. Hernandez to hold warfarin tonight and contact clinic     6/21, 6/22,: Hold warfarin 2 days  6/23: procedure  6/24: resume warfarin [1 boosted dose]    2. Repeat INR next week on 6/25  3. Verbal information provided over the phone. Albania Hernandez expresses understanding by teach back and has no further questions at this time.    Myke Patrick, [facility], PharmD  6/18/2021  10:51 EDT

## 2021-06-25 ENCOUNTER — ANTICOAGULATION VISIT (OUTPATIENT)
Dept: PHARMACY | Facility: HOSPITAL | Age: 71
End: 2021-06-25

## 2021-06-25 DIAGNOSIS — Z95.2 S/P TAVR (TRANSCATHETER AORTIC VALVE REPLACEMENT): ICD-10-CM

## 2021-06-25 DIAGNOSIS — T82.09XD PROSTHETIC VALVE DYSFUNCTION, SUBSEQUENT ENCOUNTER: Primary | ICD-10-CM

## 2021-06-25 LAB — INR PPP: 2.7

## 2021-06-25 NOTE — PROGRESS NOTES
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 50mg  ?? 35mg 45mg   INR 0.9 1.5 2.18 1.65 1.6 2.27 1.82  2.07 1.37 1.50 1.62   Notes 1st clinic COVID hold x2; decr appetite Sick;   incr ASA;  no GLV; lactulose x3 days  Boost daily   hold / procedure recv'd 12/8; doxy resume Ensure,   1x hold; doxy       Date 12/22 12/29 1/5/21 1/11 1/18 1/25 2/1 2/8 2/15 2/22 3/5 3/12   Total WeeklyDose 47.5mg 47.5mg 52.5mg 60mg 57.5mg 55mg 50mg 60mg 57.5mg 57.5mg 57.5mg 57.5mg   INR 1.88 1.60 1.59 2.58 3.06 2.12 1.54 2.52 2.43 2.84 3.0 3.2   Notes       1x miss 1x incr dose recv'd 2/22   training      Date 3/19 3/29 4/5 4/12 4/26 5/10 5/24 6/4 6/18 6/25     Total WeeklyDose 55mg 45 mg 55 mg 55 mg 55 mg 55 mg 55 mg 55 mg 55 mg 50 mg     INR 3.6 2.0 2.1 2.4 2.8 2.7 2.4 2.6 3.7 2.7     Notes 1x decr dose; HM HM; 1x miss               Phone Interview:  Tablet Strength: 1mg, 2mg, and 5mg  Estimated OOP cost: ~$5  Verbal Release Authorization signed on 10/28/2020-- may speak with Norm White (sons) and Lashanda Quinonescharlee (sister)  Patient contact: ok to [facility] day time [phone]; afternoon: [phone]  Lab contact: Jean-Paul PTEER    Patient Findings:  Negatives:  Signs/symptoms of thrombosis, Signs/symptoms of bleeding, Laboratory test error suspected, Change in health,  Change in alcohol use, Change in activity, Upcoming invasive procedure, Emergency department visit, Upcoming dental procedure, Missed doses, Extra doses, Change in medications, Change in diet/appetite, Hospital admission, Bruising, Other complaints   Comments:  Patient states that she has restarted drinking 1 BOOST per day after returning from vacation.  She also said that her diet still consists of no green vegetables.  Patient was informed that the clinic did get in touch with Dr. Rodney and the perioperative plan was approved for her upcoming dental procedure in Aug 2021.  However, we did not have the current FAX number for the dentist office; therefore, the FAX will be sent sometime early next week after the office has been reached and a new FAX number is obtained.        Plan:  1. INR is therapeutic today at 2.7 (06/25/2021).  Instructed Ms. Hernandez to Continue maintenance dose of warfarin 7.5 mg oral everyday except for 10 mg on Mon until recheck.  Perioperative plan approved by Dr. Rodney.  Samanta will review and FAX release to dentist sometime early next weak.    2. Repeat INR on 07/06/2021  3. Verbal information provided over the phone. Albania Hernandez expresses understanding by teach back and has no further questions at this time.    Patrick Clifford, Pharmacy Intern  6/25/2021  15:49 EDT     It looks like fax clearance letter was sent on 6/24 to Iris Staton DMD, [facility] in [place], [place]  I, Myke Chakraborty, PharmD, have reviewed the note in full and agree with the assessment and plan.  06/26/21  07:06 EDT

## 2021-07-06 ENCOUNTER — ANTICOAGULATION VISIT (OUTPATIENT)
Dept: PHARMACY | Facility: HOSPITAL | Age: 71
End: 2021-07-06

## 2021-07-06 DIAGNOSIS — Z95.2 S/P TAVR (TRANSCATHETER AORTIC VALVE REPLACEMENT): ICD-10-CM

## 2021-07-06 DIAGNOSIS — T82.09XD PROSTHETIC VALVE DYSFUNCTION, SUBSEQUENT ENCOUNTER: Primary | ICD-10-CM

## 2021-07-06 LAB — INR PPP: 2.2

## 2021-07-06 NOTE — PROGRESS NOTES
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 50mg  ?? 35mg 45mg   INR 0.9 1.5 2.18 1.65 1.6 2.27 1.82  2.07 1.37 1.50 1.62   Notes 1st clinic COVID hold x2; decr appetite Sick;   incr ASA;  no GLV; lactulose x3 days  Boost daily   hold / procedure recv'd 12/8; doxy resume Ensure,   1x hold; doxy       Date 12/22 12/29 1/5/21 1/11 1/18 1/25 2/1 2/8 2/15 2/22 3/5 3/12   Total WeeklyDose 47.5mg 47.5mg 52.5mg 60mg 57.5mg 55mg 50mg 60mg 57.5mg 57.5mg 57.5mg 57.5mg   INR 1.88 1.60 1.59 2.58 3.06 2.12 1.54 2.52 2.43 2.84 3.0 3.2   Notes       1x miss 1x incr dose recv'd 2/22   training      Date 3/19 3/29 4/5 4/12 4/26 5/10 5/24 6/4 6/18 6/25 7/6    Total WeeklyDose 55mg 45mg 55mg 55mg 55mg 55mg 55mg 55mg 55mg 50mg 55mg    INR 3.6 2.0 2.1 2.4 2.8 2.7 2.4 2.6 3.7 2.7 2.2    Notes 1x decr dose; HM HM; 1x miss        1x decr dose       Phone Interview:  Tablet Strength: 1mg, 2mg, and 5mg  Estimated OOP cost: ~$5  Verbal Release Authorization signed on 10/28/2020-- may speak with Norm White (sons) and Lashanda Matthew (sister)  Patient contact: ok to [facility] day time [phone]; afternoon: [phone]  Lab contact: Jean-Paul PETER    Patient Findings  Positives:  Upcoming dental procedure   Negatives:  Signs/symptoms of thrombosis, Signs/symptoms of bleeding,  Laboratory test error suspected, Change in health, Change in alcohol use, Change in activity, Upcoming invasive procedure, Emergency department visit, Missed doses, Extra doses, Change in medications, Change in diet/appetite, Hospital admission, Bruising, Other complaints   Comments:  Patient has been approved to hold warfarin 2 days prior to upcoming dental procedure. She feels appt is on 8/3/21, but she is agreeable to verify date and let us know ASAP. Otherwise denies findings.      Plan:  1. INR is therapeutic today at 2.2. Instructed Ms. Hernandez to continue maintenance dose of warfarin 7.5mg oral daily except for 10mg on Monday until recheck  2. Repeat INR in 2 weeks, 7/20  3. Verbal information provided over the phone. Albania Hernandez expresses understanding by teach back and has no further questions at this time.  4. Patient has been approved to hold warfarin 2 days prior to dental procedure. Please verify exact date of procedure with patient at next encounter.    Myke Samano CPhT  7/6/2021  12:04 EDT      It looks like she has clearance and it has been faxed to [facility].   I, Myke Chakraborty, PharmD, have reviewed the note in full and agree with the assessment and plan.  07/07/21  13:36 EDT

## 2021-07-09 DIAGNOSIS — I35.9 AORTIC VALVE DISORDER: ICD-10-CM

## 2021-07-09 RX ORDER — WARFARIN SODIUM 5 MG/1
TABLET ORAL
Qty: 150 TABLET | Refills: 0 | Status: SHIPPED | OUTPATIENT
Start: 2021-07-09 | End: 2021-08-23 | Stop reason: SDUPTHER

## 2021-07-09 NOTE — TELEPHONE ENCOUNTER
Ms. Hernandez requests refills of warfarin 5 mg be called into [facility] in [place], [place].

## 2021-07-20 ENCOUNTER — ANTICOAGULATION VISIT (OUTPATIENT)
Dept: PHARMACY | Facility: HOSPITAL | Age: 71
End: 2021-07-20

## 2021-07-20 DIAGNOSIS — T82.09XD PROSTHETIC VALVE DYSFUNCTION, SUBSEQUENT ENCOUNTER: Primary | ICD-10-CM

## 2021-07-20 DIAGNOSIS — Z95.2 S/P TAVR (TRANSCATHETER AORTIC VALVE REPLACEMENT): ICD-10-CM

## 2021-07-20 LAB — INR PPP: 1.7

## 2021-07-20 NOTE — PROGRESS NOTES
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 50mg  ?? 35mg 45mg   INR 0.9 1.5 2.18 1.65 1.6 2.27 1.82  2.07 1.37 1.50 1.62   Notes 1st clinic COVID hold x2; decr appetite Sick;   incr ASA;  no GLV; lactulose x3 days  Boost daily   hold / procedure recv'd 12/8; doxy resume Ensure,   1x hold; doxy       Date 12/22 12/29 1/5/21 1/11 1/18 1/25 2/1 2/8 2/15 2/22 3/5 3/12   Total WeeklyDose 47.5mg 47.5mg 52.5mg 60mg 57.5mg 55mg 50mg 60mg 57.5mg 57.5mg 57.5mg 57.5mg   INR 1.88 1.60 1.59 2.58 3.06 2.12 1.54 2.52 2.43 2.84 3.0 3.2   Notes       1x miss 1x incr dose recv'd 2/22   training      Date 3/19 3/29 4/5 4/12 4/26 5/10 5/24 6/4 6/18 6/25 7/6 7/20   Total WeeklyDose 55mg 45mg 55mg 55mg 55mg 55mg 55mg 55mg 55mg 50mg 55mg 55mg   INR 3.6 2.0 2.1 2.4 2.8 2.7 2.4 2.6 3.7 2.7 2.2 1.7   Notes 1x decr dose; HM HM; 1x miss        1x decr dose       Phone Interview:  Tablet Strength: 1mg, 2mg, and 5mg  Estimated OOP cost: ~$5  Verbal Release Authorization signed on 10/28/2020-- may speak with Norm White (sons) and Lashanda Matthew (sister)  Patient contact: ok to [facility] day time [phone]; afternoon: [phone]  Lab contact: Jean-Paul PETER    Patient Findings    Positives:  Missed doses   Negatives:  Signs/symptoms of thrombosis, Signs/symptoms of bleeding,  Laboratory test error suspected, Change in health, Change in alcohol use, Change in activity, Upcoming invasive procedure, Emergency department visit, Upcoming dental procedure, Extra doses, Change in medications, Change in diet/appetite, Hospital admission, Bruising, Other complaints   Comments:  Patient reported missing a dose after we spoke with her last. She is going to have a dental procedure 8/3 for which she will hold warfarin two days prior to and the day of the procedure. She has been cleared and forms have been faxed her to DMD's office per 6/18 telephone note.         Plan:  1. INR is SUBtherapeutic today at 1.7. Instructed Ms. Hernandez to boost tonight's dose and then continue maintenance dose of warfarin 7.5mg oral daily except for 10mg on Monday until recheck  2. Repeat INR 7/26.  3. Verbal information provided over the phone. Albania Hernandez expresses understanding by teach back and has no further questions at this time.  4. Patient has been approved to hold warfarin 2 days prior to dental procedure. Procedure is 8/3 @ 1300.    Thank you,    London Ceja PharmD, [place]  7/20/2021  11:49 EDT

## 2021-07-27 ENCOUNTER — ANTICOAGULATION VISIT (OUTPATIENT)
Dept: PHARMACY | Facility: HOSPITAL | Age: 71
End: 2021-07-27

## 2021-07-27 DIAGNOSIS — Z95.2 S/P TAVR (TRANSCATHETER AORTIC VALVE REPLACEMENT): ICD-10-CM

## 2021-07-27 DIAGNOSIS — T82.09XD PROSTHETIC VALVE DYSFUNCTION, SUBSEQUENT ENCOUNTER: Primary | ICD-10-CM

## 2021-07-27 LAB — INR PPP: 2.7

## 2021-07-27 NOTE — PROGRESS NOTES
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 50mg  ?? 35mg 45mg   INR 0.9 1.5 2.18 1.65 1.6 2.27 1.82  2.07 1.37 1.50 1.62   Notes 1st clinic COVID hold x2; decr appetite Sick;   incr ASA;  no GLV; lactulose x3 days  Boost daily   hold / procedure recv'd 12/8; doxy resume Ensure,   1x hold; doxy       Date 12/22 12/29 1/5/21 1/11 1/18 1/25 2/1 2/8 2/15 2/22 3/5 3/12   Total WeeklyDose 47.5mg 47.5mg 52.5mg 60mg 57.5mg 55mg 50mg 60mg 57.5mg 57.5mg 57.5mg 57.5mg   INR 1.88 1.60 1.59 2.58 3.06 2.12 1.54 2.52 2.43 2.84 3.0 3.2   Notes       1x miss 1x incr dose recv'd 2/22   training      Date 3/19 3/29 4/5 4/12 4/26 5/10 5/24 6/4 6/18 6/25 7/6 7/20   Total WeeklyDose 55mg 45mg 55mg 55mg 55mg 55mg 55mg 55mg 55mg 50mg 55mg 55mg   INR 3.6 2.0 2.1 2.4 2.8 2.7 2.4 2.6 3.7 2.7 2.2 1.7   Notes 1x decr dose; HM HM; 1x miss        1x decr dose       Phone Interview:  Tablet Strength: 1mg, 2mg, and 5mg  Estimated OOP cost: ~$5  Verbal Release Authorization signed on 10/28/2020-- may speak with Norm White (sons) and Lashanda Matthew (sister)  Patient contact: ok to [facility] day time [phone]; afternoon: [phone]  Lab contact: Jean-Paul PETER    Patient Findings    Negatives:  Signs/symptoms of thrombosis, Signs/symptoms of bleeding, Laboratory test error  suspected, Change in health, Change in alcohol use, Change in activity, Upcoming invasive procedure, Emergency department visit, Upcoming dental procedure, Missed doses, Extra doses, Change in medications, Change in diet/appetite, Hospital admission, Bruising, Other complaints   Comments:  She is going to have a dental procedure 8/3 for which she will hold warfarin two days prior to and the day of the procedure. She has been cleared and forms have been faxed her to DMD's office per 6/18 telephone note.          Plan:  1. INR is therapeutic today at 2.7. Instructed Ms. Hernandez to continue maintenance dose of warfarin 7.5mg oral daily except for 10mg on Monday until recheck. She will hold warfarin for her upcoming procedure and boost one dose  2. Repeat INR 8/6  3. Verbal information provided over the phone. Albania Hernandez expresses understanding by teach back and has no further questions at this time.  4. Patient has been approved to hold warfarin 2 days prior to dental procedure. Procedure is 8/3 @ 1300.    Thank you,    London JacksonD, [place], BCPS  7/27/2021  13:59 EDT

## 2021-08-06 ENCOUNTER — ANTICOAGULATION VISIT (OUTPATIENT)
Dept: PHARMACY | Facility: HOSPITAL | Age: 71
End: 2021-08-06

## 2021-08-06 DIAGNOSIS — T82.09XD PROSTHETIC VALVE DYSFUNCTION, SUBSEQUENT ENCOUNTER: Primary | ICD-10-CM

## 2021-08-06 DIAGNOSIS — Z95.2 S/P TAVR (TRANSCATHETER AORTIC VALVE REPLACEMENT): ICD-10-CM

## 2021-08-06 LAB — INR PPP: 1.2

## 2021-08-06 NOTE — PROGRESS NOTES
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 50mg  ?? 35mg 45mg   INR 0.9 1.5 2.18 1.65 1.6 2.27 1.82  2.07 1.37 1.50 1.62   Notes 1st clinic COVID hold x2; decr appetite Sick;   incr ASA;  no GLV; lactulose x3 days  Boost daily   hold / procedure recv'd 12/8; doxy resume Ensure,   1x hold; doxy       Date 12/22 12/29 1/5/21 1/11 1/18 1/25 2/1 2/8 2/15 2/22 3/5 3/12   Total WeeklyDose 47.5mg 47.5mg 52.5mg 60mg 57.5mg 55mg 50mg 60mg 57.5mg 57.5mg 57.5mg 57.5mg   INR 1.88 1.60 1.59 2.58 3.06 2.12 1.54 2.52 2.43 2.84 3.0 3.2   Notes       1x miss 1x incr dose recv'd 2/22   training      Date 3/19 3/29 4/5 4/12 4/26 5/10 5/24 6/4 6/18 6/25 7/6 7/20   Total WeeklyDose 55mg 45mg 55mg 55mg 55mg 55mg 55mg 55mg 55mg 50mg 55mg 55mg   INR 3.6 2.0 2.1 2.4 2.8 2.7 2.4 2.6 3.7 2.7 2.2 1.7   Notes 1x decr dose; HM HM; 1x miss        1x decr dose       Date 7/27 8/6             Total WeeklyDose 55mg 37.5mg             INR 2.7 1.2             Notes  3x hold, 1x incr dose                 Phone Interview:  Tablet Strength: 1mg, 2mg, and 5mg  Estimated OOP cost: ~$5  Verbal Release Authorization signed on 10/28/2020-- may speak with Norm White (sons) and Lashanda Matthew (sister)  Patient contact: ok to [facility] day  time [phone]; afternoon: [phone]  Lab contact: Jean-Paul PETER    Patient Findings  Positives:  Other complaints   Negatives:  Signs/symptoms of thrombosis, Signs/symptoms of bleeding, Laboratory test error suspected, Change in health, Change in alcohol use, Change in activity, Upcoming invasive procedure, Emergency department visit, Upcoming dental procedure, Missed doses, Extra doses, Change in medications, Change in diet/appetite, Hospital admission, Bruising   Comments:  Patient confirms she held warfarin 3 days prior to dental procedure and boosted 1x dose as advised. Patient also complains some short episodes of dizziness recently. She is agreealbe to contact PCP if episodes continue. Otherwise denies findings.      Plan:  1. INR is SUBtherapeutic today at 1.2. After consulting with Essence Tee, PharmD, instructed Ms. Hernandez to resume maintenance dose of warfarin 7.5mg oral daily except for 10mg on Monday until recheck  2. Repeat INR on Tues, 8/10  3. Verbal information provided over the phone. Albania Hernandez expresses understanding by teach back and has no further questions at this time.    Myke Samano CPhT  8/6/2021  15:08 EDT     I, Taylor Riedel, [facility], have reviewed the note in full and agree with the assessment and plan.  08/06/21  15:43 EDT

## 2021-08-10 ENCOUNTER — ANTICOAGULATION VISIT (OUTPATIENT)
Dept: PHARMACY | Facility: HOSPITAL | Age: 71
End: 2021-08-10

## 2021-08-10 DIAGNOSIS — T82.09XD PROSTHETIC VALVE DYSFUNCTION, SUBSEQUENT ENCOUNTER: Primary | ICD-10-CM

## 2021-08-10 DIAGNOSIS — Z95.2 S/P TAVR (TRANSCATHETER AORTIC VALVE REPLACEMENT): ICD-10-CM

## 2021-08-10 LAB — INR PPP: 1.6

## 2021-08-10 NOTE — PROGRESS NOTES
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 50mg  ?? 35mg 45mg   INR 0.9 1.5 2.18 1.65 1.6 2.27 1.82  2.07 1.37 1.50 1.62   Notes 1st clinic COVID hold x2; decr appetite Sick;   incr ASA;  no GLV; lactulose x3 days  Boost daily   hold / procedure recv'd 12/8; doxy resume Ensure,   1x hold; doxy       Date 12/22 12/29 1/5/21 1/11 1/18 1/25 2/1 2/8 2/15 2/22 3/5 3/12   Total WeeklyDose 47.5mg 47.5mg 52.5mg 60mg 57.5mg 55mg 50mg 60mg 57.5mg 57.5mg 57.5mg 57.5mg   INR 1.88 1.60 1.59 2.58 3.06 2.12 1.54 2.52 2.43 2.84 3.0 3.2   Notes       1x miss 1x incr dose recv'd 2/22   training      Date 3/19 3/29 4/5 4/12 4/26 5/10 5/24 6/4 6/18 6/25 7/6 7/20   Total WeeklyDose 55mg 45mg 55mg 55mg 55mg 55mg 55mg 55mg 55mg 50mg 55mg 55mg   INR 3.6 2.0 2.1 2.4 2.8 2.7 2.4 2.6 3.7 2.7 2.2 1.7   Notes 1x decr dose; HM HM; 1x miss        1x decr dose       Date 7/27 8/6 8/10            Total WeeklyDose 55mg 37.5mg 55 mg            INR 2.7 1.2 1.6            Notes  3x hold, 1x incr dose                 Phone Interview:  Tablet Strength: 1mg, 2mg, and 5mg  Estimated OOP cost: ~$5  Verbal Release Authorization signed on 10/28/2020-- may speak with Norm White (sons) and Lashanda Matthew (sister)  Patient contact: ok  Detail Level: Detailed to [facility] day time [phone]; afternoon: [phone]  Lab contact: Jean-Paul PETER    Patient Findings  Positives:  Change in medications   Negatives:  Signs/symptoms of thrombosis, Signs/symptoms of bleeding, Laboratory test error suspected, Change in health, Change in alcohol use, Change in activity, Upcoming invasive procedure, Emergency department visit, Upcoming dental procedure, Missed doses, Extra doses, Change in diet/appetite, Hospital admission, Bruising, Other complaints   Comments:  Has a cream she has been applying to cancer on her leg.       Plan:  1. INR is SUBtherapeutic today at 1.6. Instructed Ms. Hernandez to take warfarin 10 mg today and then continue maintenance dose of warfarin 7.5mg oral daily except for 10mg on Monday until recheck  2. Repeat INR on Monday 8/16.  3. Verbal information provided over the phone. Albania Hernandez expresses understanding by teach back and has no further questions at this time.    Myke Chakraborty, PharmD  8/10/2021  13:08 EDT

## 2021-08-16 ENCOUNTER — ANTICOAGULATION VISIT (OUTPATIENT)
Dept: PHARMACY | Facility: HOSPITAL | Age: 71
End: 2021-08-16

## 2021-08-16 DIAGNOSIS — Z95.2 S/P TAVR (TRANSCATHETER AORTIC VALVE REPLACEMENT): ICD-10-CM

## 2021-08-16 DIAGNOSIS — T82.09XD PROSTHETIC VALVE DYSFUNCTION, SUBSEQUENT ENCOUNTER: Primary | ICD-10-CM

## 2021-08-16 LAB — INR PPP: 2.4

## 2021-08-16 NOTE — PROGRESS NOTES
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 50mg  ?? 35mg 45mg   INR 0.9 1.5 2.18 1.65 1.6 2.27 1.82  2.07 1.37 1.50 1.62   Notes 1st clinic COVID hold x2; decr appetite Sick;   incr ASA;  no GLV; lactulose x3 days  Boost daily   hold / procedure recv'd 12/8; doxy resume Ensure,   1x hold; doxy       Date 12/22 12/29 1/5/21 1/11 1/18 1/25 2/1 2/8 2/15 2/22 3/5 3/12   Total WeeklyDose 47.5mg 47.5mg 52.5mg 60mg 57.5mg 55mg 50mg 60mg 57.5mg 57.5mg 57.5mg 57.5mg   INR 1.88 1.60 1.59 2.58 3.06 2.12 1.54 2.52 2.43 2.84 3.0 3.2   Notes       1x miss 1x incr dose recv'd 2/22   training      Date 3/19 3/29 4/5 4/12 4/26 5/10 5/24 6/4 6/18 6/25 7/6 7/20   Total WeeklyDose 55mg 45mg 55mg 55mg 55mg 55mg 55mg 55mg 55mg 50mg 55mg 55mg   INR 3.6 2.0 2.1 2.4 2.8 2.7 2.4 2.6 3.7 2.7 2.2 1.7   Notes 1x decr dose; HM HM; 1x miss        1x decr dose       Date 7/27 8/6 8/10 8/16           Total WeeklyDose 55mg 37.5mg 55 mg 57.5 mg           INR 2.7 1.2 1.6 2.4           Notes  3x hold, 1x incr dose               Phone Interview:  Tablet Strength: 1mg, 2mg, and 5mg  Estimated OOP cost: ~$5  Verbal Release Authorization signed on 10/28/2020-- may speak with Norm White (sons) and Lashanda Matthew (sister)  Patient  contact: ok to [facility] day time [phone]; afternoon: [phone]  Lab contact: Jean-Paul PETER    Patient Findings:  Negatives:  Signs/symptoms of thrombosis, Signs/symptoms of bleeding, Laboratory test error suspected, Change in health, Change in alcohol use, Change in activity, Upcoming invasive procedure, Emergency department visit, Upcoming dental procedure, Missed doses, Extra doses, Change in medications, Change in diet/appetite, Hospital admission, Bruising, Other complaints     Plan:  1. INR is back WNL today at 2.4. Instructed Ms. Hernandez to resume maintenance dose of warfarin 7.5 mg daily except 10 mg on Monday until recheck.  2. Repeat INR in one week, 8/23.  3. Verbal information provided over the phone. Albania Hernandez expresses understanding by teach back and has no further questions at this time.    Samanta Ko, Pharmacy Technician  8/16/2021  10:05 EDT     I, Ji Salgado, PharmD, have reviewed the note in full and agree with the assessment and plan. Return to previous maintenance dose.  08/16/21  14:49 EDT

## 2021-08-23 ENCOUNTER — ANTICOAGULATION VISIT (OUTPATIENT)
Dept: PHARMACY | Facility: HOSPITAL | Age: 71
End: 2021-08-23

## 2021-08-23 DIAGNOSIS — Z95.2 S/P TAVR (TRANSCATHETER AORTIC VALVE REPLACEMENT): ICD-10-CM

## 2021-08-23 DIAGNOSIS — I35.9 AORTIC VALVE DISORDER: ICD-10-CM

## 2021-08-23 DIAGNOSIS — T82.09XD PROSTHETIC VALVE DYSFUNCTION, SUBSEQUENT ENCOUNTER: Primary | ICD-10-CM

## 2021-08-23 LAB — INR PPP: 1.6

## 2021-08-23 RX ORDER — WARFARIN SODIUM 5 MG/1
TABLET ORAL
Qty: 150 TABLET | Refills: 1 | Status: SHIPPED | OUTPATIENT
Start: 2021-08-23 | End: 2021-11-10 | Stop reason: SDUPTHER

## 2021-08-23 NOTE — PROGRESS NOTES
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 50mg  ?? 35mg 45mg   INR 0.9 1.5 2.18 1.65 1.6 2.27 1.82  2.07 1.37 1.50 1.62   Notes 1st clinic COVID hold x2; decr appetite Sick;   incr ASA;  no GLV; lactulose x3 days  Boost daily   hold / procedure recv'd 12/8; doxy resume Ensure,   1x hold; doxy       Date 12/22 12/29 1/5/21 1/11 1/18 1/25 2/1 2/8 2/15 2/22 3/5 3/12   Total WeeklyDose 47.5mg 47.5mg 52.5mg 60mg 57.5mg 55mg 50mg 60mg 57.5mg 57.5mg 57.5mg 57.5mg   INR 1.88 1.60 1.59 2.58 3.06 2.12 1.54 2.52 2.43 2.84 3.0 3.2   Notes       1x miss 1x incr dose recv'd 2/22   training      Date 3/19 3/29 4/5 4/12 4/26 5/10 5/24 6/4 6/18 6/25 7/6 7/20   Total WeeklyDose 55mg 45mg 55mg 55mg 55mg 55mg 55mg 55mg 55mg 50mg 55mg 55mg   INR 3.6 2.0 2.1 2.4 2.8 2.7 2.4 2.6 3.7 2.7 2.2 1.7   Notes 1x decr dose; HM HM; 1x miss        1x decr dose       Date 7/27 8/6 8/10 8/16 8/23          Total WeeklyDose 55mg 37.5mg 55 mg 57.5 mg 55mg          INR 2.7 1.2 1.6 2.4 1.6          Notes  3x hold, 1x incr dose   Miss x 1            Phone Interview:  Tablet Strength: 1mg, 2mg, and 5mg  Estimated OOP cost: ~$5  Verbal Release Authorization signed on 10/28/2020-- may speak with Norm White (venkat) and Lashanda Matthew  (sister)  Patient contact: ok to [facility] day time [phone]; afternoon: [phone]  Lab contact: Jean-Paul PETER    Patient Findings    Positives:  Missed doses   Negatives:  Signs/symptoms of thrombosis, Signs/symptoms of bleeding, Laboratory test error suspected, Change in health, Change in alcohol use, Change in activity, Upcoming invasive procedure, Emergency department visit, Upcoming dental procedure, Extra doses, Change in medications, Change in diet/appetite, Hospital admission, Bruising, Other complaints   Comments:  Ms. Hernandez says she missed a dose while staying with her mother last week.           Plan:  1. INR is subtherapeutic today at 1.6 . Instructed Ms. Hernandez to boost today and tomorrow's dose to 12.5mg and 10mg,respectively, and then resume maintenance dose of warfarin 7.5 mg daily except 10 mg on Monday until recheck.  2. Repeat INR in one week, 8/30.  3. Verbal information provided over the phone. Albania Hernandez expresses understanding by teach back and has no further questions at this time.    Thank you,    London JacksonD, [place], BCPS  8/23/2021  11:03 EDT

## 2021-08-30 ENCOUNTER — ANTICOAGULATION VISIT (OUTPATIENT)
Dept: PHARMACY | Facility: HOSPITAL | Age: 71
End: 2021-08-30

## 2021-08-30 DIAGNOSIS — T82.09XD PROSTHETIC VALVE DYSFUNCTION, SUBSEQUENT ENCOUNTER: Primary | ICD-10-CM

## 2021-08-30 DIAGNOSIS — Z95.2 S/P TAVR (TRANSCATHETER AORTIC VALVE REPLACEMENT): ICD-10-CM

## 2021-08-30 LAB — INR PPP: 1.6

## 2021-08-30 NOTE — PROGRESS NOTES
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 50mg  ?? 35mg 45mg   INR 0.9 1.5 2.18 1.65 1.6 2.27 1.82  2.07 1.37 1.50 1.62   Notes 1st clinic COVID hold x2; decr appetite Sick;   incr ASA;  no GLV; lactulose x3 days  Boost daily   hold / procedure recv'd 12/8; doxy resume Ensure,   1x hold; doxy       Date 12/22 12/29 1/5/21 1/11 1/18 1/25 2/1 2/8 2/15 2/22 3/5 3/12   Total WeeklyDose 47.5mg 47.5mg 52.5mg 60mg 57.5mg 55mg 50mg 60mg 57.5mg 57.5mg 57.5mg 57.5mg   INR 1.88 1.60 1.59 2.58 3.06 2.12 1.54 2.52 2.43 2.84 3.0 3.2   Notes       1x miss 1x incr dose recv'd 2/22   training      Date 3/19 3/29 4/5 4/12 4/26 5/10 5/24 6/4 6/18 6/25 7/6 7/20   Total WeeklyDose 55mg 45mg 55mg 55mg 55mg 55mg 55mg 55mg 55mg 50mg 55mg 55mg   INR 3.6 2.0 2.1 2.4 2.8 2.7 2.4 2.6 3.7 2.7 2.2 1.7   Notes 1x decr dose; HM HM; 1x miss        1x decr dose       Date 7/27 8/6 8/10 8/16 8/23 8/30         Total WeeklyDose 55mg 37.5mg 55 mg 57.5 mg 55mg 60mg         INR 2.7 1.2 1.6 2.4 1.6 1.6         Notes  3x hold, 1x incr dose   Miss x 1 Boost x 2           Phone Interview:  Tablet Strength: 1mg, 2mg, and 5mg  Estimated OOP cost: ~$5  Verbal Release Authorization signed on 10/28/2020-- may speak with Norm White  (sons) and Lashanda Matthew (sister)  Patient contact: ok to LVM day time [phone]; afternoon: [phone]  Lab contact: Jean-Paul PETER    Patient Findings    Negatives:  Signs/symptoms of thrombosis, Signs/symptoms of bleeding, Laboratory test error suspected, Change in health, Change in alcohol use, Change in activity, Upcoming invasive procedure, Emergency department visit, Upcoming dental procedure, Missed doses, Extra doses, Change in medications, Change in diet/appetite, Hospital admission, Bruising, Other complaints   Comments:  Ms Hernandez reports no changes to her diet or regimen. She was able to get the boosted dose in last week and did not miss a dose this week.        Plan:  1. INR is subtherapeutic today at 1.6 despite boosted doses. . Instructed Ms. Hernandez to boost today and tomorrow's dose to 12.5mg and 10mg,respectively, and then resume maintenance dose of warfarin 7.5 mg daily except 10 mg on Monday. She will also boost Friday's dose this week to 10mg.  2. Repeat INR in one week, 9/7.  3. Verbal information provided over the phone. Albania Hernandez expresses understanding by teach back and has no further questions at this time.    Thank you,    London JacksonD, [place], BCPS  8/30/2021  09:51 EDT

## 2021-09-07 ENCOUNTER — ANTICOAGULATION VISIT (OUTPATIENT)
Dept: PHARMACY | Facility: HOSPITAL | Age: 71
End: 2021-09-07

## 2021-09-07 DIAGNOSIS — T82.09XD PROSTHETIC VALVE DYSFUNCTION, SUBSEQUENT ENCOUNTER: Primary | ICD-10-CM

## 2021-09-07 DIAGNOSIS — Z95.2 S/P TAVR (TRANSCATHETER AORTIC VALVE REPLACEMENT): ICD-10-CM

## 2021-09-07 LAB — INR PPP: 2

## 2021-09-07 NOTE — PROGRESS NOTES
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 50mg  ?? 35mg 45mg   INR 0.9 1.5 2.18 1.65 1.6 2.27 1.82  2.07 1.37 1.50 1.62   Notes 1st clinic COVID hold x2; decr appetite Sick;   incr ASA;  no GLV; lactulose x3 days  Boost daily   hold / procedure recv'd 12/8; doxy resume Ensure,   1x hold; doxy       Date 12/22 12/29 1/5/21 1/11 1/18 1/25 2/1 2/8 2/15 2/22 3/5 3/12   Total WeeklyDose 47.5mg 47.5mg 52.5mg 60mg 57.5mg 55mg 50mg 60mg 57.5mg 57.5mg 57.5mg 57.5mg   INR 1.88 1.60 1.59 2.58 3.06 2.12 1.54 2.52 2.43 2.84 3.0 3.2   Notes       1x miss 1x incr dose recv'd 2/22   training      Date 3/19 3/29 4/5 4/12 4/26 5/10 5/24 6/4 6/18 6/25 7/6 7/20   Total WeeklyDose 55mg 45mg 55mg 55mg 55mg 55mg 55mg 55mg 55mg 50mg 55mg 55mg   INR 3.6 2.0 2.1 2.4 2.8 2.7 2.4 2.6 3.7 2.7 2.2 1.7   Notes 1x decr dose; HM HM; 1x miss        1x decr dose       Date 7/27 8/6 8/10 8/16 8/23 8/30 9/7        Total WeeklyDose 55mg 37.5mg 55mg 57.5mg 55mg 60mg 57.5mg        INR 2.7 1.2 1.6 2.4 1.6 1.6 2.0        Notes  3x hold, 1x incr dose   1x miss 2x incr dose 3x incr dose;   1x mis- dose          Phone Interview:  Tablet Strength: 1mg, 2mg, and 5mg  Estimated OOP cost: ~$5  Verbal Release Authorization signed on  10/28/2020-- may speak with Norm White (sons) and Lashanda Matthew (sister)  Patient contact: ok to M day time [phone]; afternoon: [phone]  Lab contact: Jean-Paul PETER    Patient Findings  Negatives:  Signs/symptoms of thrombosis, Signs/symptoms of bleeding, Laboratory test error suspected, Change in health, Change in alcohol use, Change in activity, Upcoming invasive procedure, Emergency department visit, Upcoming dental procedure, Missed doses, Extra doses, Change in medications, Change in diet/appetite, Hospital admission, Bruising, Other complaints     Plan:  1. INR is therapeutic and back WNL today at 2.0, though this is LLN. After consulting with Essence Tee, PharmD, instructed Ms. Hernandez to take warfarin 7.5mg oral daily except for 10mg on MonWedFri until recheck (60mg/week)  2. Repeat INR in one week to ensure WNL  3. Verbal information provided over the phone. [place]ia Hernandez expresses understanding by teach back and has no further questions at this time.    Myke Samano CPhT  9/7/2021  12:26 EDT      I, Chata Holguin, PharmD, have reviewed the note in full and agree with the assessment and plan.  09/07/21  15:44 EDT

## 2021-09-13 ENCOUNTER — ANTICOAGULATION VISIT (OUTPATIENT)
Dept: PHARMACY | Facility: HOSPITAL | Age: 71
End: 2021-09-13

## 2021-09-13 DIAGNOSIS — Z95.2 S/P TAVR (TRANSCATHETER AORTIC VALVE REPLACEMENT): ICD-10-CM

## 2021-09-13 DIAGNOSIS — T82.09XD PROSTHETIC VALVE DYSFUNCTION, SUBSEQUENT ENCOUNTER: Primary | ICD-10-CM

## 2021-09-13 LAB — INR PPP: 2

## 2021-09-13 NOTE — PROGRESS NOTES
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 50mg  ?? 35mg 45mg   INR 0.9 1.5 2.18 1.65 1.6 2.27 1.82  2.07 1.37 1.50 1.62   Notes 1st clinic COVID hold x2; decr appetite Sick;   incr ASA;  no GLV; lactulose x3 days  Boost daily   hold / procedure recv'd 12/8; doxy resume Ensure,   1x hold; doxy       Date 12/22 12/29 1/5/21 1/11 1/18 1/25 2/1 2/8 2/15 2/22 3/5 3/12   Total WeeklyDose 47.5mg 47.5mg 52.5mg 60mg 57.5mg 55mg 50mg 60mg 57.5mg 57.5mg 57.5mg 57.5mg   INR 1.88 1.60 1.59 2.58 3.06 2.12 1.54 2.52 2.43 2.84 3.0 3.2   Notes       1x miss 1x incr dose recv'd 2/22   training      Date 3/19 3/29 4/5 4/12 4/26 5/10 5/24 6/4 6/18 6/25 7/6 7/20   Total WeeklyDose 55mg 45mg 55mg 55mg 55mg 55mg 55mg 55mg 55mg 50mg 55mg 55mg   INR 3.6 2.0 2.1 2.4 2.8 2.7 2.4 2.6 3.7 2.7 2.2 1.7   Notes 1x decr dose; HM HM; 1x miss        1x decr dose       Date 7/27 8/6 8/10 8/16 8/23 8/30 9/7 9/13       Total WeeklyDose 55mg 37.5mg 55mg 57.5mg 55mg 60mg 57.5mg 57.5 mg 60 mg      INR 2.7 1.2 1.6 2.4 1.6 1.6 2.0 2.0       Notes  3x hold, 1x incr dose   1x miss 2x incr dose 3x incr dose;   1x mis- dose          Phone Interview:  Tablet Strength: 1mg, 2mg, and 5mg  Estimated OOP cost: ~$5  Verbal Release  Authorization signed on 10/28/2020-- may speak with Norm White (sons) and Lashanda Matthew (sister)  Patient contact: ok to LVM day time [phone]; afternoon: [phone]  Lab contact: Jean-Paul PETER    Patient Findings:  Negatives:  Signs/symptoms of thrombosis, Signs/symptoms of bleeding, Laboratory test error suspected, Change in health, Change in alcohol use, Change in activity, Upcoming invasive procedure, Emergency department visit, Upcoming dental procedure, Missed doses, Extra doses, Change in medications, Change in diet/appetite, Hospital admission, Bruising, Other complaints     Plan:  1. INR is therapeutic again today at 2, LLN. Instructed Ms. Hernandez to increased to warfarin 7.5 mg oral daily except for 10 mg on MonWedFri until recheck (60 mg/week, 9.1% increase).  2. Repeat INR in one week, 9/20.  3. Verbal information provided over the phone. [place]ia Hernandez expresses understanding by teach back and has no further questions at this time.    Samanta Ko, Nii  9/13/2021  10:30 EDT    I, Natalie Venegas, PharmD, have reviewed the note in full and agree with the assessment and plan.  09/13/21  15:05 EDT

## 2021-09-20 ENCOUNTER — ANTICOAGULATION VISIT (OUTPATIENT)
Dept: PHARMACY | Facility: HOSPITAL | Age: 71
End: 2021-09-20

## 2021-09-20 DIAGNOSIS — Z95.2 S/P TAVR (TRANSCATHETER AORTIC VALVE REPLACEMENT): ICD-10-CM

## 2021-09-20 DIAGNOSIS — T82.09XD PROSTHETIC VALVE DYSFUNCTION, SUBSEQUENT ENCOUNTER: Primary | ICD-10-CM

## 2021-09-20 LAB — INR PPP: 2.3

## 2021-09-20 NOTE — PROGRESS NOTES
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 50mg  ?? 35mg 45mg   INR 0.9 1.5 2.18 1.65 1.6 2.27 1.82  2.07 1.37 1.50 1.62   Notes 1st clinic COVID hold x2; decr appetite Sick;   incr ASA;  no GLV; lactulose x3 days  Boost daily   hold / procedure recv'd 12/8; doxy resume Ensure,   1x hold; doxy       Date 12/22 12/29 1/5/21 1/11 1/18 1/25 2/1 2/8 2/15 2/22 3/5 3/12   Total WeeklyDose 47.5mg 47.5mg 52.5mg 60mg 57.5mg 55mg 50mg 60mg 57.5mg 57.5mg 57.5mg 57.5mg   INR 1.88 1.60 1.59 2.58 3.06 2.12 1.54 2.52 2.43 2.84 3.0 3.2   Notes       1x miss 1x incr dose recv'd 2/22   training      Date 3/19 3/29 4/5 4/12 4/26 5/10 5/24 6/4 6/18 6/25 7/6 7/20   Total WeeklyDose 55mg 45mg 55mg 55mg 55mg 55mg 55mg 55mg 55mg 50mg 55mg 55mg   INR 3.6 2.0 2.1 2.4 2.8 2.7 2.4 2.6 3.7 2.7 2.2 1.7   Notes 1x decr dose; HM HM; 1x miss        1x decr dose       Date 7/27 8/6 8/10 8/16 8/23 8/30 9/7 9/13 9/20      Total WeeklyDose 55mg 37.5mg 55mg 57.5mg 55mg 60mg 57.5mg 57.5 mg 60 mg      INR 2.7 1.2 1.6 2.4 1.6 1.6 2.0 2.0 2.3      Notes  3x hold, 1x incr dose   1x miss 2x incr dose 3x incr dose;   1x mis- dose          Phone Interview:  Tablet Strength: 1mg, 2mg, and 5mg  Estimated OOP cost: ~$5  Verbal Release  Authorization signed on 10/28/2020-- may speak with Norm White (sons) and Lashanda Matthew (sister)  Patient contact: ok to M day time [phone]; afternoon: [phone]  Lab contact: Jean-Paul PETER    Patient Findings:  Negatives:  Signs/symptoms of thrombosis, Signs/symptoms of bleeding, Laboratory test error suspected, Change in health, Change in alcohol use, Change in activity, Upcoming invasive procedure, Emergency department visit, Upcoming dental procedure, Missed doses, Extra doses, Change in medications, Change in diet/appetite, Hospital admission, Bruising, Other complaints     Plan:  1. INR is therapeutic today at 2.3. Instructed Ms. Hernandez to continue increased dose of warfarin 7.5 mg daily except 10 mg on MonWedFri until recheck (60 mg/week).  2. Repeat INR on Wednesday, 9/29.  3. Verbal information provided over the phone. Albania Hernandez expresses understanding by teach back and has no further questions at this time.    Samanta Ko CPhT  9/20/2021  10:11 EDT     I, Chata Holguin, PharmD, have reviewed the note in full and agree with the assessment and plan.  09/20/21  14:50 EDT

## 2021-09-29 ENCOUNTER — ANTICOAGULATION VISIT (OUTPATIENT)
Dept: PHARMACY | Facility: HOSPITAL | Age: 71
End: 2021-09-29

## 2021-09-29 DIAGNOSIS — Z95.2 S/P TAVR (TRANSCATHETER AORTIC VALVE REPLACEMENT): ICD-10-CM

## 2021-09-29 DIAGNOSIS — T82.09XD PROSTHETIC VALVE DYSFUNCTION, SUBSEQUENT ENCOUNTER: Primary | ICD-10-CM

## 2021-09-29 LAB — INR PPP: 2.6

## 2021-09-29 NOTE — PROGRESS NOTES
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 50mg  ?? 35mg 45mg   INR 0.9 1.5 2.18 1.65 1.6 2.27 1.82  2.07 1.37 1.50 1.62   Notes 1st clinic COVID hold x2; decr appetite Sick;   incr ASA;  no GLV; lactulose x3 days  Boost daily   hold / procedure recv'd 12/8; doxy resume Ensure,   1x hold; doxy       Date 12/22 12/29 1/5/21 1/11 1/18 1/25 2/1 2/8 2/15 2/22 3/5 3/12   Total WeeklyDose 47.5mg 47.5mg 52.5mg 60mg 57.5mg 55mg 50mg 60mg 57.5mg 57.5mg 57.5mg 57.5mg   INR 1.88 1.60 1.59 2.58 3.06 2.12 1.54 2.52 2.43 2.84 3.0 3.2   Notes       1x miss 1x incr dose recv'd 2/22   training      Date 3/19 3/29 4/5 4/12 4/26 5/10 5/24 6/4 6/18 6/25 7/6 7/20   Total WeeklyDose 55mg 45mg 55mg 55mg 55mg 55mg 55mg 55mg 55mg 50mg 55mg 55mg   INR 3.6 2.0 2.1 2.4 2.8 2.7 2.4 2.6 3.7 2.7 2.2 1.7   Notes 1x decr dose; HM HM; 1x miss        1x decr dose       Date 7/27 8/6 8/10 8/16 8/23 8/30 9/7 9/13 9/20 9/29     Total WeeklyDose 55mg 37.5mg 55mg 57.5mg 55mg 60mg 57.5mg 57.5 mg 60 mg 60 mg     INR 2.7 1.2 1.6 2.4 1.6 1.6 2.0 2.0 2.3 2.6     Notes  3x hold, 1x incr dose   1x miss 2x incr dose 3x incr dose;   1x mis- dose          Phone Interview:  Tablet Strength: 1mg, 2mg, and 5mg  Estimated OOP cost: ~$5  Verbal  Release Authorization signed on 10/28/2020-- may speak with Norm White (sons) and Lashanda Matthew (sister)  Patient contact: ok to LVM day time [phone]; afternoon: [phone]  Lab contact: Jean-Paul PETER    Patient Findings:  Negatives:  Signs/symptoms of thrombosis, Signs/symptoms of bleeding, Laboratory test error suspected, Change in health, Change in alcohol use, Change in activity, Upcoming invasive procedure, Emergency department visit, Upcoming dental procedure, Missed doses, Extra doses, Change in medications, Change in diet/appetite, Hospital admission, Bruising, Other complaints     Plan:  1. INR is therapeutic today at 2.6. Instructed Ms. Hernandez to continue increased dose of warfarin 7.5 mg daily except 10 mg on MonWedFri until recheck (60 mg/week).  2. Repeat INR in two weeks, 10/13.  3. Verbal information provided over the phone. [place]ia Hernandez expresses understanding by teach back and has no further questions at this time.    Samanta Ko CPhT  9/29/2021  09:39 EDT    I, Esther Gary, PharmD, have reviewed the note in full and agree with the assessment and plan.  09/29/21  09:59 EDT

## 2021-10-13 ENCOUNTER — ANTICOAGULATION VISIT (OUTPATIENT)
Dept: PHARMACY | Facility: HOSPITAL | Age: 71
End: 2021-10-13

## 2021-10-13 DIAGNOSIS — T82.09XD PROSTHETIC VALVE DYSFUNCTION, SUBSEQUENT ENCOUNTER: Primary | ICD-10-CM

## 2021-10-13 DIAGNOSIS — Z95.2 S/P TAVR (TRANSCATHETER AORTIC VALVE REPLACEMENT): ICD-10-CM

## 2021-10-13 LAB — INR PPP: 3.3

## 2021-10-13 NOTE — PROGRESS NOTES
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 50mg  ?? 35mg 45mg   INR 0.9 1.5 2.18 1.65 1.6 2.27 1.82  2.07 1.37 1.50 1.62   Notes 1st clinic COVID hold x2; decr appetite Sick;   incr ASA;  no GLV; lactulose x3 days  Boost daily   hold / procedure recv'd 12/8; doxy resume Ensure,   1x hold; doxy       Date 12/22 12/29 1/5/21 1/11 1/18 1/25 2/1 2/8 2/15 2/22 3/5 3/12   Total WeeklyDose 47.5mg 47.5mg 52.5mg 60mg 57.5mg 55mg 50mg 60mg 57.5mg 57.5mg 57.5mg 57.5mg   INR 1.88 1.60 1.59 2.58 3.06 2.12 1.54 2.52 2.43 2.84 3.0 3.2   Notes       1x miss 1x incr dose recv'd 2/22   training      Date 3/19 3/29 4/5 4/12 4/26 5/10 5/24 6/4 6/18 6/25 7/6 7/20   Total WeeklyDose 55mg 45mg 55mg 55mg 55mg 55mg 55mg 55mg 55mg 50mg 55mg 55mg   INR 3.6 2.0 2.1 2.4 2.8 2.7 2.4 2.6 3.7 2.7 2.2 1.7   Notes 1x decr dose; HM HM; 1x miss        1x decr dose       Date 7/27 8/6 8/10 8/16 8/23 8/30 9/7 9/13 9/20 9/29 10/13    Total WeeklyDose 55mg 37.5mg 55mg 57.5mg 55mg 60mg 57.5mg 57.5 mg 60 mg 60 mg 60mg    INR 2.7 1.2 1.6 2.4 1.6 1.6 2.0 2.0 2.3 2.6 3.3    Notes  3x hold, 1x incr dose   1x miss 2x incr dose 3x incr dose;   1x mis- dose          Phone Interview:  Tablet Strength: 1mg, 2mg, and 5mg  Estimated OOP cost:  ~$5  Verbal Release Authorization signed on 10/28/2020-- may speak with Norm White (sons) and Lashanda Matthew (sister)  Patient contact: ok to LVM day time [phone]; afternoon: [phone]  Lab contact: Jean-Paul PETER    Patient Findings:      Negatives:  Signs/symptoms of thrombosis, Signs/symptoms of bleeding, Laboratory test error suspected, Change in health, Change in alcohol use, Change in activity, Upcoming invasive procedure, Emergency department visit, Upcoming dental procedure, Missed doses, Extra doses, Change in medications, Change in diet/appetite, Hospital admission, Bruising, Other complaints       Plan:  1. INR is supratherapeutic today at 3.3. Instructed Ms. Hernandez to reduce dose to warfarin 7.5 mg daily except 10 mg on MonFri until recheck (57.5 mg/week).  2. Repeat INR in 1 week, if wNL can resume q2w  3. Verbal information provided over the phone. [place]ia Hernandez expresses understanding by teach back and has no further questions at this time.    Chata Holguin, PharmD.  10/13/21   13:46 EDT

## 2021-10-20 ENCOUNTER — ANTICOAGULATION VISIT (OUTPATIENT)
Dept: PHARMACY | Facility: HOSPITAL | Age: 71
End: 2021-10-20

## 2021-10-20 DIAGNOSIS — Z95.2 S/P TAVR (TRANSCATHETER AORTIC VALVE REPLACEMENT): ICD-10-CM

## 2021-10-20 DIAGNOSIS — T82.09XD PROSTHETIC VALVE DYSFUNCTION, SUBSEQUENT ENCOUNTER: Primary | ICD-10-CM

## 2021-10-20 LAB — INR PPP: 3.3

## 2021-10-20 NOTE — PROGRESS NOTES
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 50mg  ?? 35mg 45mg   INR 0.9 1.5 2.18 1.65 1.6 2.27 1.82  2.07 1.37 1.50 1.62   Notes 1st clinic COVID hold x2; decr appetite Sick;   incr ASA;  no GLV; lactulose x3 days  Boost daily   hold / procedure recv'd 12/8; doxy resume Ensure,   1x hold; doxy       Date 12/22 12/29 1/5/21 1/11 1/18 1/25 2/1 2/8 2/15 2/22 3/5 3/12   Total WeeklyDose 47.5mg 47.5mg 52.5mg 60mg 57.5mg 55mg 50mg 60mg 57.5mg 57.5mg 57.5mg 57.5mg   INR 1.88 1.60 1.59 2.58 3.06 2.12 1.54 2.52 2.43 2.84 3.0 3.2   Notes       1x miss 1x incr dose recv'd 2/22   training      Date 3/19 3/29 4/5 4/12 4/26 5/10 5/24 6/4 6/18 6/25 7/6 7/20   Total WeeklyDose 55mg 45mg 55mg 55mg 55mg 55mg 55mg 55mg 55mg 50mg 55mg 55mg   INR 3.6 2.0 2.1 2.4 2.8 2.7 2.4 2.6 3.7 2.7 2.2 1.7   Notes 1x decr dose; HM HM; 1x miss        1x decr dose       Date 7/27 8/6 8/10 8/16 8/23 8/30 9/7 9/13 9/20 9/29 10/13 10/20   Total WeeklyDose 55mg 37.5mg 55mg 57.5mg 55mg 60mg 57.5mg 57.5 mg 60 mg 60 mg 60mg 57.5mg   INR 2.7 1.2 1.6 2.4 1.6 1.6 2.0 2.0 2.3 2.6 3.3 3.3   Notes  3x hold, 1x incr dose   1x miss 2x incr dose 3x incr dose;   1x mis- dose          Phone Interview:  Tablet Strength: 1mg, 2mg, and  5mg  Estimated OOP cost: ~$5  Verbal Release Authorization signed on 10/28/2020-- may speak with Norm White (sons) and Lashanda Matthew (sister)  Patient contact: ok to LVM day time [phone]; afternoon: [phone]  Lab contact: Jean-Paul PETER    Patient Findings:    Negatives:  Signs/symptoms of thrombosis, Signs/symptoms of bleeding, Laboratory test error suspected, Change in health, Change in alcohol use, Change in activity, Upcoming invasive procedure, Emergency department visit, Upcoming dental procedure, Missed doses, Extra doses, Change in medications, Change in diet/appetite, Hospital admission, Bruising, Other complaints       Plan:  1. INR is supratherapeutic again today at 3.3. Instructed Ms. Hernandez to reduce dose to warfarin 7.5 mg daily except 10 mg on Mon until recheck (55 mg/week).  2. Repeat INR in 1 week  3. Verbal information provided over the phone. [place]ia Hernandez expresses understanding by teach back and has no further questions at this time.    Chata Holguin, PharmD.  10/20/21   14:48 EDT

## 2021-10-27 ENCOUNTER — ANTICOAGULATION VISIT (OUTPATIENT)
Dept: PHARMACY | Facility: HOSPITAL | Age: 71
End: 2021-10-27

## 2021-10-27 DIAGNOSIS — Z95.2 S/P TAVR (TRANSCATHETER AORTIC VALVE REPLACEMENT): ICD-10-CM

## 2021-10-27 DIAGNOSIS — T82.09XD PROSTHETIC VALVE DYSFUNCTION, SUBSEQUENT ENCOUNTER: Primary | ICD-10-CM

## 2021-10-27 LAB — INR PPP: 2.1

## 2021-10-27 NOTE — PROGRESS NOTES
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 50mg  ?? 35mg 45mg   INR 0.9 1.5 2.18 1.65 1.6 2.27 1.82  2.07 1.37 1.50 1.62   Notes 1st clinic COVID hold x2; decr appetite Sick;   incr ASA;  no GLV; lactulose x3 days  Boost daily   hold / procedure recv'd 12/8; doxy resume Ensure,   1x hold; doxy       Date 12/22 12/29 1/5/21 1/11 1/18 1/25 2/1 2/8 2/15 2/22 3/5 3/12   Total WeeklyDose 47.5mg 47.5mg 52.5mg 60mg 57.5mg 55mg 50mg 60mg 57.5mg 57.5mg 57.5mg 57.5mg   INR 1.88 1.60 1.59 2.58 3.06 2.12 1.54 2.52 2.43 2.84 3.0 3.2   Notes       1x miss 1x incr dose recv'd 2/22   training      Date 3/19 3/29 4/5 4/12 4/26 5/10 5/24 6/4 6/18 6/25 7/6 7/20   Total WeeklyDose 55mg 45mg 55mg 55mg 55mg 55mg 55mg 55mg 55mg 50mg 55mg 55mg   INR 3.6 2.0 2.1 2.4 2.8 2.7 2.4 2.6 3.7 2.7 2.2 1.7   Notes 1x decr dose; HM HM; 1x miss        1x decr dose       Date 7/27 8/6 8/10 8/16 8/23 8/30 9/7 9/13 9/20 9/29 10/13 10/20   Total WeeklyDose 55mg 37.5mg 55mg 57.5mg 55mg 60mg 57.5mg 57.5 mg 60 mg 60 mg 60mg 57.5mg   INR 2.7 1.2 1.6 2.4 1.6 1.6 2.0 2.0 2.3 2.6 3.3 3.3   Notes  3x hold, 1x incr dose   1x miss 2x incr dose 3x incr dose;   1x mis- dose          Date 10/27       Total Weekly Dose 47.5 mg       INR 2.1        Notes 1 miss         Phone Interview:  Tablet Strength: 1mg, 2mg, and 5mg  Estimated OOP cost: ~$5  Verbal Release Authorization signed on 10/28/2020-- may speak with Norm White (sons) and Lashanda Matthew (sister)  Patient contact: ok to [facility] day time [phone]; afternoon: [phone]  Lab contact: Jean-Paul PETER    Patient Findings:  Positives:  Missed doses   Negatives:  Signs/symptoms of thrombosis, Signs/symptoms of bleeding, Laboratory test error suspected, Change in health, Change in alcohol use, Change in activity, Upcoming invasive procedure, Emergency department visit, Upcoming dental procedure, Extra doses, Change in medications, Change in diet/appetite, Hospital admission, Bruising, Other complaints   Comments:  Missed her dose this past Friday. She denies any other changes.     Plan:  1. INR is back WNL today at 2.1. Significant decrease in one week likely due to missed dose. Instructed Ms. Hernandez to resume maintenance dose of warfarin 7.5 mg daily except 10 mg on Monday this week.  2. Repeat INR in one week, 11/3.  3. Verbal information provided over the phone. Albania Hernandez expresses understanding by teach back and has no further questions at this time.    Samanta Ko CPhT  10/27/2021  15:10 EDT    I, Essence Tee, PharmD, have reviewed the note in full and agree with the assessment and plan.  10/29/21  10:41 EDT

## 2021-11-03 ENCOUNTER — ANTICOAGULATION VISIT (OUTPATIENT)
Dept: PHARMACY | Facility: HOSPITAL | Age: 71
End: 2021-11-03

## 2021-11-03 DIAGNOSIS — T82.09XD PROSTHETIC VALVE DYSFUNCTION, SUBSEQUENT ENCOUNTER: Primary | ICD-10-CM

## 2021-11-03 DIAGNOSIS — Z95.2 S/P TAVR (TRANSCATHETER AORTIC VALVE REPLACEMENT): ICD-10-CM

## 2021-11-03 LAB — INR PPP: 2.1

## 2021-11-03 NOTE — PROGRESS NOTES
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 50mg  ?? 35mg 45mg   INR 0.9 1.5 2.18 1.65 1.6 2.27 1.82  2.07 1.37 1.50 1.62   Notes 1st clinic COVID hold x2; decr appetite Sick;   incr ASA;  no GLV; lactulose x3 days  Boost daily   hold / procedure recv'd 12/8; doxy resume Ensure,   1x hold; doxy       Date 12/22 12/29 1/5/21 1/11 1/18 1/25 2/1 2/8 2/15 2/22 3/5 3/12   Total WeeklyDose 47.5mg 47.5mg 52.5mg 60mg 57.5mg 55mg 50mg 60mg 57.5mg 57.5mg 57.5mg 57.5mg   INR 1.88 1.60 1.59 2.58 3.06 2.12 1.54 2.52 2.43 2.84 3.0 3.2   Notes       1x miss 1x incr dose recv'd 2/22   training      Date 3/19 3/29 4/5 4/12 4/26 5/10 5/24 6/4 6/18 6/25 7/6 7/20   Total WeeklyDose 55mg 45mg 55mg 55mg 55mg 55mg 55mg 55mg 55mg 50mg 55mg 55mg   INR 3.6 2.0 2.1 2.4 2.8 2.7 2.4 2.6 3.7 2.7 2.2 1.7   Notes 1x decr dose; HM HM; 1x miss        1x decr dose       Date 7/27 8/6 8/10 8/16 8/23 8/30 9/7 9/13 9/20 9/29 10/13 10/20   Total WeeklyDose 55mg 37.5mg 55mg 57.5mg 55mg 60mg 57.5mg 57.5 mg 60 mg 60 mg 60mg 57.5mg   INR 2.7 1.2 1.6 2.4 1.6 1.6 2.0 2.0 2.3 2.6 3.3 3.3   Notes  3x hold, 1x incr dose   1x miss 2x incr dose 3x incr dose;   1x mis- dose          Date 10/27 11/3      Total Weekly Dose 47.5 mg 55 mg      INR  2.1 2.1      Notes 1 miss         Phone Interview:  Tablet Strength: 1mg, 2mg, and 5mg  Estimated OOP cost: ~$5  Verbal Release Authorization signed on 10/28/2020-- may speak with Norm White (sons) and Lashanda Matthew (sister)  Patient contact: ok to [facility] day time [phone]; afternoon: [phone]  Lab contact: Jean-Paul PETER    Patient Findings:  Negatives:  Signs/symptoms of thrombosis, Signs/symptoms of bleeding, Laboratory test error suspected, Change in health, Change in alcohol use, Change in activity, Upcoming invasive procedure, Emergency department visit, Upcoming dental procedure, Missed doses, Extra doses, Change in medications, Change in diet/appetite, Hospital admission, Bruising, Other complaints     Plan:  1. INR is therapeutic again today at 2.1. Instructed Ms. Hernandez to continue maintenance dose of warfarin 7.5 mg daily except 10 mg on Monday until recheck.  2. Repeat INR in one week, 11/10.  3. Verbal information provided over the phone. Albania Hernandez expresses understanding by teach back and has no further questions at this time.    Samanta Ko, Nii  11/3/2021  10:04 EDT    I, Aashish Mccrary, PharmD, have reviewed the note in full and agree with the assessment and plan.  11/03/21  10:10 EDT

## 2021-11-10 ENCOUNTER — ANTICOAGULATION VISIT (OUTPATIENT)
Dept: PHARMACY | Facility: HOSPITAL | Age: 71
End: 2021-11-10

## 2021-11-10 DIAGNOSIS — I35.9 AORTIC VALVE DISORDER: ICD-10-CM

## 2021-11-10 DIAGNOSIS — T82.09XD PROSTHETIC VALVE DYSFUNCTION, SUBSEQUENT ENCOUNTER: Primary | ICD-10-CM

## 2021-11-10 DIAGNOSIS — Z95.2 S/P TAVR (TRANSCATHETER AORTIC VALVE REPLACEMENT): ICD-10-CM

## 2021-11-10 LAB — INR PPP: 1.9

## 2021-11-10 RX ORDER — WARFARIN SODIUM 5 MG/1
TABLET ORAL
Qty: 150 TABLET | Refills: 1 | Status: SHIPPED | OUTPATIENT
Start: 2021-11-10 | End: 2021-11-17

## 2021-11-10 NOTE — PROGRESS NOTES
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 50mg  ?? 35mg 45mg   INR 0.9 1.5 2.18 1.65 1.6 2.27 1.82  2.07 1.37 1.50 1.62   Notes 1st clinic COVID hold x2; decr appetite Sick;   incr ASA;  no GLV; lactulose x3 days  Boost daily   hold / procedure recv'd 12/8; doxy resume Ensure,   1x hold; doxy       Date 12/22 12/29 1/5/21 1/11 1/18 1/25 2/1 2/8 2/15 2/22 3/5 3/12   Total WeeklyDose 47.5mg 47.5mg 52.5mg 60mg 57.5mg 55mg 50mg 60mg 57.5mg 57.5mg 57.5mg 57.5mg   INR 1.88 1.60 1.59 2.58 3.06 2.12 1.54 2.52 2.43 2.84 3.0 3.2   Notes       1x miss 1x incr dose recv'd 2/22   training      Date 3/19 3/29 4/5 4/12 4/26 5/10 5/24 6/4 6/18 6/25 7/6 7/20   Total WeeklyDose 55mg 45mg 55mg 55mg 55mg 55mg 55mg 55mg 55mg 50mg 55mg 55mg   INR 3.6 2.0 2.1 2.4 2.8 2.7 2.4 2.6 3.7 2.7 2.2 1.7   Notes 1x decr dose; HM HM; 1x miss        1x decr dose       Date 7/27 8/6 8/10 8/16 8/23 8/30 9/7 9/13 9/20 9/29 10/13 10/20   Total WeeklyDose 55mg 37.5mg 55mg 57.5mg 55mg 60mg 57.5mg 57.5 mg 60 mg 60 mg 60mg 57.5mg   INR 2.7 1.2 1.6 2.4 1.6 1.6 2.0 2.0 2.3 2.6 3.3 3.3   Notes  3x hold, 1x incr dose   1x miss 2x incr dose 3x incr dose;   1x mis- dose          Date 10/27 11/3 11/10     Total Weekly Dose 47.5 mg 55 mg       INR 2.1 2.1 1.9     Notes 1 miss         Phone Interview:  Tablet Strength: 1mg, 2mg, and 5mg  Estimated OOP cost: ~$5  Verbal Release Authorization signed on 10/28/2020-- may speak with Norm White (sons) and Lashanda Matthew (sister)  Patient contact: ok to [facility] day time [phone]; afternoon: [phone]  Lab contact: Jean-Paul PETER    Patient Findings:  Negatives:  Signs/symptoms of thrombosis, Signs/symptoms of bleeding, Laboratory test error suspected, Change in health, Change in alcohol use, Change in activity, Upcoming invasive procedure, Emergency department visit, Upcoming dental procedure, Missed doses, Extra doses, Change in medications, Change in diet/appetite, Hospital admission, Bruising, Other complaints   Comments:  Has cut back on sugar recently. Will bring HM to IN CLINIC appt next week           Plan:  1. INR is subtherapeutic at 1.9. Instructed Ms. Hernandez to increase dose to warfarin 7.5 mg daily except 10 mg on MondayWednsday until recheck.  2. Repeat INR in one week, 11/17 IN CLINIC  3. Verbal information provided over the phone. Albania Hernandez expresses understanding by teach back and has no further questions at this time.    Chata Holguin, PharmD.  11/10/21   10:30 EST

## 2021-11-16 NOTE — PROGRESS NOTES
[facility] Cardiology    Patient ID: Albania Hernandez is a 71 y.o. female.  : 1950   Contact: [phone]    Encounter date: 2021    PCP: Kimberly Anglin FNP      Chief complaint:   Chief Complaint   Patient presents with   • Severe aortic stenosis       Problem List:  1. Aortic valve disease:  a. C, 2019, [facility]: Nonobstructive CAD with 20-30% mid LAD lesion   b. Echo, 10/31/2019: Calcified AV, severe AS with BALDO 0.62cm2, mean gradient 70mmHg, max gradient 120mmHg, trace AI. Mild LVH, EF 55-60%, mild MR.   c. Class II VELAZQUEZ  d. SAW, 2019: Severe AS, mean gradient 71.2 mmHg, BALDO 0.5 cm2.  e. S/p TAVR, 2019, with 23 mm Garcia Antoni 3 pericardial prosthesis. Intra-op TAVR SAW showed mean PG 9 mmHg with BALDO 1.9 cm2.  f. Echo, 2020: EF 60%. S/p TAVR with 23 mm Garcia Antoni 3 pericardial prosthesis. Mean PG 15.0 mmHg, BALDO 1.2 cm2.  g. Echo, 2020: S/p TAVR with 23 mm Garcia Antoni 3 pericardial prosthesis. Mean PG 16 mmHg.   h. Echo, 10/28/2020: EF: 55%, There is a 23 mm Antoni 3 TAVR present. Peak velocity of the flow distal to the aortic valve is 341 cm/s. Aortic valve maximum pressure gradient is 46 mmHg. Aortic valve mean pressure gradient is 25 mmHg. Trace MR.   i. On Coumadin due to elevated gradients.   j. Echo, 2021: EF 55%. Borderline concentric LVH. 23 mm TAVR present. Mean PG 17 mmHg.   k. Echo, 2021: Estimated TAVR mean pressure gradient 14 mmHg  2. History of hiatal hernia, s/p repair   3. Arthritis  4. Osteoporosis      No Known Allergies    Current Medications:    Current Outpatient Medications:   •  aspirin (ASPIR) 81 MG EC tablet, Take 81 mg by mouth Daily., Disp: , Rfl:   •  calcium carbonate (OS-SHAINA) 600 MG tablet, Take 600 mg by mouth Daily., Disp: , Rfl:   •  fexofenadine (ALLEGRA) 180 MG tablet, Take 180 mg by mouth Daily., Disp: , Rfl:   •  warfarin (Coumadin) 5 MG tablet, Take 1.5-2 tablets by mouth  "daily or as directed by the anticoagulation clinic., Disp: 150 tablet, Rfl: 1    HPI    Albania Hernandez is a 71 y.o. female who presents today for a 6 month follow up of severe aortic stenosis. Since last visit, the patient has been doing well overall from a cardiovascular standpoint. She does not monitor her blood pressure at home. She was rushed and anxious about being late today which may be why her blood pressure is elevated. Patient denies chest pain, shortness of breath, palpitations, edema, dizziness, and syncope.     The following portions of the patient's history were reviewed and updated as appropriate: allergies, current medications and problem list.    Pertinent positives as listed in the HPI.  All other systems reviewed are negative.         Vitals:    11/17/21 1314   BP: 150/76   BP Location: Left arm   Patient Position: Sitting   Pulse: 62   SpO2: 96%   Weight: 62.4 kg (137 lb 9.6 oz)   Height: 165.1 cm (65\")       Physical Exam:  General: Alert and oriented.  Neck: Jugular venous pressure is within normal limits. Carotids have normal upstrokes without bruits.   Cardiovascular: Heart has a nondisplaced focal PMI. Regular rate and rhythm. No murmur, gallop or rub.  Lungs: Clear, no rales or wheezes. Equal expansion is noted.   Extremities: Show no edema.  Skin: Warm and dry.  Neurologic: Nonfocal.     Diagnostic Data (reviewed with patient):    • Lab date: 9/22/2020  • FLP: , TG 89, HDL 50,   • CMP: Glu 102, BUN 3.7, Creat 0.7, eGFR >60, Na 137, K 3.7, Cl 104, CO2 28, Ca 9.3, Alk Phos 69, AST 33, ALT 26      Procedures      Assessment:    ICD-10-CM ICD-9-CM   1. Severe aortic stenosis  I35.0 424.1         Plan:  1. Discontinue warfarin since TAVR mean pressure gradient has normalized.  2. Same day echocardiogram next visit to re-evaluate aortic valve gradient.   3. Begin monitoring blood pressure at home with goal of systolic pressure <130. If consistently elevated she is to call our " office or PCP's for further recommendations.   4. Continue on aspirin 81 mg for s/p TAVR.   5. Continue all other current medications.  6. F/up in 6 months, sooner if needed.    Scribed for Veronica Rodney MD by Anca Gallegos. 11/17/2021 13:19 EST      I Veronica Rodney MD personally performed the services described in this documentation as scribed by the above individual in my presence, and it is both accurate and complete.    Veronica Rodney MD, FACC

## 2021-11-17 ENCOUNTER — OFFICE VISIT (OUTPATIENT)
Dept: CARDIOLOGY | Facility: CLINIC | Age: 71
End: 2021-11-17

## 2021-11-17 ENCOUNTER — HOSPITAL ENCOUNTER (OUTPATIENT)
Dept: CARDIOLOGY | Facility: HOSPITAL | Age: 71
Discharge: HOME | End: 2021-11-17
Admitting: NURSE PRACTITIONER

## 2021-11-17 ENCOUNTER — APPOINTMENT (OUTPATIENT)
Dept: PHARMACY | Facility: HOSPITAL | Age: 71
End: 2021-11-17

## 2021-11-17 ENCOUNTER — TELEPHONE (OUTPATIENT)
Dept: PHARMACY | Facility: HOSPITAL | Age: 71
End: 2021-11-17

## 2021-11-17 VITALS
HEART RATE: 62 BPM | DIASTOLIC BLOOD PRESSURE: 70 MMHG | SYSTOLIC BLOOD PRESSURE: 144 MMHG | BODY MASS INDEX: 22.92 KG/M2 | HEIGHT: 65 IN | WEIGHT: 137.6 LBS | OXYGEN SATURATION: 96 %

## 2021-11-17 VITALS — HEIGHT: 66 IN | BODY MASS INDEX: 22.68 KG/M2 | WEIGHT: 141.09 LBS

## 2021-11-17 DIAGNOSIS — I35.0 SEVERE AORTIC STENOSIS: Primary | ICD-10-CM

## 2021-11-17 DIAGNOSIS — I35.0 SEVERE AORTIC STENOSIS: ICD-10-CM

## 2021-11-17 DIAGNOSIS — Z95.2 HISTORY OF TRANSCATHETER AORTIC VALVE REPLACEMENT (TAVR): ICD-10-CM

## 2021-11-17 PROCEDURE — 93306 TTE W/DOPPLER COMPLETE: CPT | Performed by: INTERNAL MEDICINE

## 2021-11-17 PROCEDURE — 99214 OFFICE O/P EST MOD 30 MIN: CPT | Performed by: INTERNAL MEDICINE

## 2021-11-17 PROCEDURE — 93306 TTE W/DOPPLER COMPLETE: CPT

## 2021-11-17 NOTE — TELEPHONE ENCOUNTER
Spoke with Rima- patient is discontinuing warfarin today following appointment with Dr. Rodney. Will at this time, close her encounter.

## 2021-11-18 LAB
ASCENDING AORTA: 3.1 CM
BH CV ECHO MEAS - AO MAX PG (FULL): 35.1 MMHG
BH CV ECHO MEAS - AO MAX PG: 31 MMHG
BH CV ECHO MEAS - AO MEAN PG (FULL): 18.3 MMHG
BH CV ECHO MEAS - AO MEAN PG: 14 MMHG
BH CV ECHO MEAS - AO ROOT AREA (BSA CORRECTED): 1.7
BH CV ECHO MEAS - AO ROOT AREA: 6.8 CM^2
BH CV ECHO MEAS - AO ROOT DIAM: 3 CM
BH CV ECHO MEAS - AO V2 MAX: 278 CM/SEC
BH CV ECHO MEAS - AO V2 MEAN: 211.4 CM/SEC
BH CV ECHO MEAS - AO V2 VTI: 53.4 CM
BH CV ECHO MEAS - ASC AORTA: 3.1 CM
BH CV ECHO MEAS - AVA(I,A): 0.88 CM^2
BH CV ECHO MEAS - AVA(I,D): 1.4 CM^2
BH CV ECHO MEAS - AVA(V,A): 0.83 CM^2
BH CV ECHO MEAS - AVA(V,D): 0.83 CM^2
BH CV ECHO MEAS - BSA(HAYCOCK): 1.7 M^2
BH CV ECHO MEAS - BSA: 1.7 M^2
BH CV ECHO MEAS - BZI_BMI: 23.5 KILOGRAMS/M^2
BH CV ECHO MEAS - BZI_METRIC_HEIGHT: 165.1 CM
BH CV ECHO MEAS - BZI_METRIC_WEIGHT: 64 KG
BH CV ECHO MEAS - EDV(CUBED): 91.4 ML
BH CV ECHO MEAS - EDV(MOD-SP2): 63 ML
BH CV ECHO MEAS - EDV(MOD-SP4): 64 ML
BH CV ECHO MEAS - EDV(TEICH): 92.6 ML
BH CV ECHO MEAS - EF(CUBED): 68.5 %
BH CV ECHO MEAS - EF(MOD-BP): 68 %
BH CV ECHO MEAS - EF(MOD-SP2): 66.7 %
BH CV ECHO MEAS - EF(MOD-SP4): 65.6 %
BH CV ECHO MEAS - EF(TEICH): 60.2 %
BH CV ECHO MEAS - ESV(CUBED): 28.8 ML
BH CV ECHO MEAS - ESV(MOD-SP2): 21 ML
BH CV ECHO MEAS - ESV(MOD-SP4): 22 ML
BH CV ECHO MEAS - ESV(TEICH): 36.9 ML
BH CV ECHO MEAS - FS: 32 %
BH CV ECHO MEAS - IVS/LVPW: 1
BH CV ECHO MEAS - IVSD: 0.77 CM
BH CV ECHO MEAS - LA DIMENSION: 3.8 CM
BH CV ECHO MEAS - LA/AO: 1.3
BH CV ECHO MEAS - LAD MAJOR: 4.6 CM
BH CV ECHO MEAS - LAT PEAK E' VEL: 9.7 CM/SEC
BH CV ECHO MEAS - LATERAL E/E' RATIO: 9.2
BH CV ECHO MEAS - LV DIASTOLIC VOL/BSA (35-75): 37.5 ML/M^2
BH CV ECHO MEAS - LV IVRT: 0.09 SEC
BH CV ECHO MEAS - LV MASS(C)D: 104.9 GRAMS
BH CV ECHO MEAS - LV MASS(C)DI: 61.5 GRAMS/M^2
BH CV ECHO MEAS - LV MAX PG: 5.8 MMHG
BH CV ECHO MEAS - LV MEAN PG: 3.3 MMHG
BH CV ECHO MEAS - LV SYSTOLIC VOL/BSA (12-30): 12.9 ML/M^2
BH CV ECHO MEAS - LV V1 MAX: 120 CM/SEC
BH CV ECHO MEAS - LV V1 MEAN: 84.7 CM/SEC
BH CV ECHO MEAS - LV V1 VTI: 28.9 CM
BH CV ECHO MEAS - LVIDD: 4.5 CM
BH CV ECHO MEAS - LVIDS: 3.1 CM
BH CV ECHO MEAS - LVLD AP2: 7.9 CM
BH CV ECHO MEAS - LVLD AP4: 7.5 CM
BH CV ECHO MEAS - LVLS AP2: 6.5 CM
BH CV ECHO MEAS - LVLS AP4: 6.4 CM
BH CV ECHO MEAS - LVOT AREA (M): 2.3 CM^2
BH CV ECHO MEAS - LVOT AREA: 2.2 CM^2
BH CV ECHO MEAS - LVOT DIAM: 1.7 CM
BH CV ECHO MEAS - LVPWD: 0.74 CM
BH CV ECHO MEAS - MED PEAK E' VEL: 7.1 CM/SEC
BH CV ECHO MEAS - MEDIAL E/E' RATIO: 12.4
BH CV ECHO MEAS - MV A MAX VEL: 81.9 CM/SEC
BH CV ECHO MEAS - MV DEC SLOPE: 363.7 CM/SEC^2
BH CV ECHO MEAS - MV DEC TIME: 0.32 SEC
BH CV ECHO MEAS - MV E MAX VEL: 90.3 CM/SEC
BH CV ECHO MEAS - MV E/A: 1.1
BH CV ECHO MEAS - MV P1/2T MAX VEL: 112.7 CM/SEC
BH CV ECHO MEAS - MV P1/2T: 90.7 MSEC
BH CV ECHO MEAS - MVA P1/2T LCG: 2 CM^2
BH CV ECHO MEAS - MVA(P1/2T): 2.4 CM^2
BH CV ECHO MEAS - PA ACC SLOPE: 492.9 CM/SEC^2
BH CV ECHO MEAS - PA ACC TIME: 0.11 SEC
BH CV ECHO MEAS - PA PR(ACCEL): 29.9 MMHG
BH CV ECHO MEAS - RVDD: 2.8 CM
BH CV ECHO MEAS - SI(AO): 290 ML/M^2
BH CV ECHO MEAS - SI(CUBED): 36.7 ML/M^2
BH CV ECHO MEAS - SI(LVOT): 37.4 ML/M^2
BH CV ECHO MEAS - SI(MOD-SP2): 24.6 ML/M^2
BH CV ECHO MEAS - SI(MOD-SP4): 24.6 ML/M^2
BH CV ECHO MEAS - SI(TEICH): 32.7 ML/M^2
BH CV ECHO MEAS - SV(AO): 494.5 ML
BH CV ECHO MEAS - SV(CUBED): 62.6 ML
BH CV ECHO MEAS - SV(LVOT): 63.7 ML
BH CV ECHO MEAS - SV(MOD-SP2): 42 ML
BH CV ECHO MEAS - SV(MOD-SP4): 42 ML
BH CV ECHO MEAS - SV(TEICH): 55.8 ML
BH CV ECHO MEAS - TAPSE (>1.6): 1.6 CM
BH CV ECHO MEAS - TR MAX PG: 21 MMHG
BH CV ECHO MEAS - TR MAX VEL: 228 CM/SEC
BH CV ECHO MEASUREMENTS AVERAGE E/E' RATIO: 10.75
BH CV VAS BP LEFT ARM: NORMAL MMHG
BH CV XLRA - RV BASE: 3.2 CM
BH CV XLRA - RV LENGTH: 6.9 CM
BH CV XLRA - RV MID: 3 CM
BH CV XLRA - TDI S': 10.4 CM/SEC
LEFT ATRIUM VOLUME INDEX: 31.1 ML/M^2
LEFT ATRIUM VOLUME: 53 ML
LV EF 2D ECHO EST: 60 %

## 2022-05-18 ENCOUNTER — OFFICE VISIT (OUTPATIENT)
Dept: CARDIOLOGY | Facility: CLINIC | Age: 72
End: 2022-05-18

## 2022-05-18 ENCOUNTER — HOSPITAL ENCOUNTER (OUTPATIENT)
Dept: CARDIOLOGY | Facility: HOSPITAL | Age: 72
Discharge: HOME | End: 2022-05-18
Admitting: INTERNAL MEDICINE

## 2022-05-18 VITALS
HEART RATE: 74 BPM | SYSTOLIC BLOOD PRESSURE: 150 MMHG | OXYGEN SATURATION: 97 % | WEIGHT: 135 LBS | DIASTOLIC BLOOD PRESSURE: 82 MMHG | HEIGHT: 65 IN | BODY MASS INDEX: 22.49 KG/M2

## 2022-05-18 VITALS — BODY MASS INDEX: 22.92 KG/M2 | WEIGHT: 137.57 LBS | HEIGHT: 65 IN

## 2022-05-18 DIAGNOSIS — Z95.2 S/P TAVR (TRANSCATHETER AORTIC VALVE REPLACEMENT): Primary | ICD-10-CM

## 2022-05-18 DIAGNOSIS — I35.0 SEVERE AORTIC STENOSIS: Primary | ICD-10-CM

## 2022-05-18 DIAGNOSIS — Z95.2 HISTORY OF TRANSCATHETER AORTIC VALVE REPLACEMENT (TAVR): ICD-10-CM

## 2022-05-18 LAB
BH CV ECHO MEAS - AO MAX PG: 30 MMHG
BH CV ECHO MEAS - AO MEAN PG: 17 MMHG
BH CV ECHO MEAS - AO ROOT DIAM: 2.7 CM
BH CV ECHO MEAS - AO V2 MAX: 272 CM/SEC
BH CV ECHO MEAS - AO V2 VTI: 61.5 CM
BH CV ECHO MEAS - AVA(I,D): 1.3 CM2
BH CV ECHO MEAS - EDV(CUBED): 59.7 ML
BH CV ECHO MEAS - EDV(MOD-SP2): 52.4 ML
BH CV ECHO MEAS - EDV(MOD-SP4): 62.7 ML
BH CV ECHO MEAS - EF(MOD-BP): 62.4 %
BH CV ECHO MEAS - EF(MOD-SP2): 61.1 %
BH CV ECHO MEAS - EF(MOD-SP4): 63.8 %
BH CV ECHO MEAS - ESV(CUBED): 19.1 ML
BH CV ECHO MEAS - ESV(MOD-SP2): 20.4 ML
BH CV ECHO MEAS - ESV(MOD-SP4): 22.7 ML
BH CV ECHO MEAS - FS: 31.6 %
BH CV ECHO MEAS - IVS/LVPW: 1.04 CM
BH CV ECHO MEAS - IVSD: 0.85 CM
BH CV ECHO MEAS - LA DIMENSION: 3.4 CM
BH CV ECHO MEAS - LV DIASTOLIC VOL/BSA (35-75): 37.2 CM2
BH CV ECHO MEAS - LV MASS(C)D: 95 GRAMS
BH CV ECHO MEAS - LV MAX PG: 5 MMHG
BH CV ECHO MEAS - LV MEAN PG: 2.47 MMHG
BH CV ECHO MEAS - LV SYSTOLIC VOL/BSA (12-30): 13.5 CM2
BH CV ECHO MEAS - LV V1 MAX: 111.9 CM/SEC
BH CV ECHO MEAS - LV V1 VTI: 25.8 CM
BH CV ECHO MEAS - LVIDD: 3.9 CM
BH CV ECHO MEAS - LVIDS: 2.7 CM
BH CV ECHO MEAS - LVOT AREA: 3 CM2
BH CV ECHO MEAS - LVOT DIAM: 1.95 CM
BH CV ECHO MEAS - LVPWD: 0.82 CM
BH CV ECHO MEAS - SI(MOD-SP2): 19 ML/M2
BH CV ECHO MEAS - SI(MOD-SP4): 23.7 ML/M2
BH CV ECHO MEAS - SV(LVOT): 77.2 ML
BH CV ECHO MEAS - SV(MOD-SP2): 32 ML
BH CV ECHO MEAS - SV(MOD-SP4): 40 ML
BH CV VAS BP RIGHT ARM: NORMAL MMHG
LV EF 2D ECHO EST: 60 %
MAXIMAL PREDICTED HEART RATE: 149 BPM
STRESS TARGET HR: 127 BPM

## 2022-05-18 PROCEDURE — 93321 DOPPLER ECHO F-UP/LMTD STD: CPT

## 2022-05-18 PROCEDURE — 93325 DOPPLER ECHO COLOR FLOW MAPG: CPT | Performed by: INTERNAL MEDICINE

## 2022-05-18 PROCEDURE — 93321 DOPPLER ECHO F-UP/LMTD STD: CPT | Performed by: INTERNAL MEDICINE

## 2022-05-18 PROCEDURE — 99213 OFFICE O/P EST LOW 20 MIN: CPT | Performed by: INTERNAL MEDICINE

## 2022-05-18 PROCEDURE — 93308 TTE F-UP OR LMTD: CPT | Performed by: INTERNAL MEDICINE

## 2022-05-18 PROCEDURE — 93325 DOPPLER ECHO COLOR FLOW MAPG: CPT

## 2022-05-18 PROCEDURE — 93308 TTE F-UP OR LMTD: CPT

## 2022-05-18 NOTE — PROGRESS NOTES
[facility] Cardiology    Patient ID: Albania Hernandez is a 71 y.o. female.  : 1950   Contact: [phone]    Encounter date: 2022    PCP: Kimberly Anglin FNP      Chief complaint:   Chief Complaint   Patient presents with   • Severe aortic stenosis       Problem List:  1. Aortic valve disease:  a. LHC, 2019, [facility]: Nonobstructive CAD with 20-30% mid LAD lesion   b. Echo, 10/31/2019: Calcified AV, severe AS with BALDO 0.62cm2, mean gradient 70mmHg, max gradient 120mmHg, trace AI. Mild LVH, EF 55-60%, mild MR.   c. Class II VELAZQUEZ  d. SAW, 2019: Severe AS, mean gradient 71.2 mmHg, BALDO 0.5 cm2.  e. S/p TAVR, 2019, with 23 mm Garcia Antoni 3 pericardial prosthesis. Intra-op TAVR SAW showed mean PG 9 mmHg with BALDO 1.9 cm2.  f. Echo, 2020: EF 60%. S/p TAVR with 23 mm Garcia Antoni 3 pericardial prosthesis. Mean PG 15.0 mmHg, BALDO 1.2 cm2.  g. Echo, 2020: S/p TAVR with 23 mm Garcia Antoni 3 pericardial prosthesis. Mean PG 16 mmHg.   h. Echo, 10/28/2020: EF: 55%, There is a 23 mm Antoni 3 TAVR present. Peak velocity of the flow distal to the aortic valve is 341 cm/s. Aortic valve maximum pressure gradient is 46 mmHg. Aortic valve mean pressure gradient is 25 mmHg. Trace MR.   i. On Coumadin due to elevated gradients.   j. Echo, 2021: EF 55%. Borderline concentric LVH. 23 mm TAVR present. Mean PG 17 mmHg.   k. Echo, 2021: Estimated TAVR mean pressure gradient 14 mmHg  2. History of hiatal hernia, s/p repair   3. Arthritis  4. Osteoporosis      No Known Allergies    Current Medications:    Current Outpatient Medications:   •  aspirin (aspirin) 81 MG EC tablet, Take 81 mg by mouth Daily., Disp: , Rfl:   •  calcium carbonate (OS-SHAINA) 600 MG tablet, Take 600 mg by mouth Daily., Disp: , Rfl:   •  fexofenadine (ALLEGRA) 180 MG tablet, Take 180 mg by mouth Daily., Disp: , Rfl:     HPI    Albania Hernandez is a 71 y.o. female who presents  "today for a 6 month follow up of severe aortic stenosis and cardiac risk factors. Since last visit, the patient has been doing well overall from a cardiovascular standpoint. She does not get much activity. She cleans houses for work and stays with her mother as well who has Alzheimer's. Her blood pressure at home is well, ranging in the 120's to the 130's. Patient denies chest pain, shortness of breath, orthopnea, palpitations, edema, dizziness, and syncope.     The following portions of the patient's history were reviewed and updated as appropriate: allergies, current medications and problem list.    Pertinent positives as listed in the HPI.  All other systems reviewed are negative.         Vitals:    05/18/22 1259   BP: 150/82   BP Location: Left arm   Patient Position: Sitting   Cuff Size: Adult   Pulse: 74   SpO2: 97%   Weight: 61.2 kg (135 lb)   Height: 165.1 cm (65\")       Physical Exam:  General: Alert and oriented.  Neck: Jugular venous pressure is within normal limits. Carotids have normal upstrokes without bruits.   Cardiovascular: Heart has a nondisplaced focal PMI. Regular rate and rhythm. No murmur, gallop or rub.  Lungs: Clear, no rales or wheezes. Equal expansion is noted.   Extremities: Show no edema.  Skin: Warm and dry.  Neurologic: Nonfocal.     Diagnostic Data (reviewed with patient):    Lab date     04/14/2021FLP: , TG 89, HDL 50,   FLP 10/25/21:  , HDL 46, LDL, 119, trig 121        Procedures      Assessment:    ICD-10-CM ICD-9-CM   1. Severe aortic stenosis status post-TAVR I35.0 424.1         Plan:  1. Stable cardiac status.  2. Echocardiogram to be scheduled in a year to re-evaluate patient's severe aortic stenosis.   3. Continue on aspirin 81 mg for antiplatelet therapy.   4. Continue all other current medications.  5. F/up in 12 months, sooner if needed.    Scribed for Veronica Rodney MD by Isela Perez. 5/18/2022 13:07 EDT      I Veronica Rodney MD " personally performed the services described in this documentation as scribed by the above individual in my presence, and it is both accurate and complete.    Veronica Rodney MD, FACC

## 2023-03-06 DIAGNOSIS — Z95.2 S/P TAVR (TRANSCATHETER AORTIC VALVE REPLACEMENT): Primary | ICD-10-CM

## 2023-05-24 ENCOUNTER — HOSPITAL ENCOUNTER (OUTPATIENT)
Dept: CARDIOLOGY | Facility: HOSPITAL | Age: 73
Discharge: HOME | End: 2023-05-24
Admitting: INTERNAL MEDICINE
Payer: MEDICARE

## 2023-05-24 ENCOUNTER — OFFICE VISIT (OUTPATIENT)
Dept: CARDIOLOGY | Facility: CLINIC | Age: 73
End: 2023-05-24
Payer: MEDICARE

## 2023-05-24 VITALS
WEIGHT: 133 LBS | DIASTOLIC BLOOD PRESSURE: 70 MMHG | HEART RATE: 76 BPM | BODY MASS INDEX: 21.38 KG/M2 | OXYGEN SATURATION: 96 % | SYSTOLIC BLOOD PRESSURE: 128 MMHG | HEIGHT: 66 IN

## 2023-05-24 VITALS
HEIGHT: 65 IN | WEIGHT: 134.92 LBS | DIASTOLIC BLOOD PRESSURE: 102 MMHG | SYSTOLIC BLOOD PRESSURE: 154 MMHG | BODY MASS INDEX: 22.48 KG/M2

## 2023-05-24 DIAGNOSIS — Z95.2 S/P TAVR (TRANSCATHETER AORTIC VALVE REPLACEMENT): ICD-10-CM

## 2023-05-24 DIAGNOSIS — I35.0 SEVERE AORTIC STENOSIS: Primary | ICD-10-CM

## 2023-05-24 DIAGNOSIS — I50.32 CHRONIC DIASTOLIC CONGESTIVE HEART FAILURE: ICD-10-CM

## 2023-05-24 PROCEDURE — 1159F MED LIST DOCD IN RCRD: CPT | Performed by: INTERNAL MEDICINE

## 2023-05-24 PROCEDURE — 93306 TTE W/DOPPLER COMPLETE: CPT | Performed by: INTERNAL MEDICINE

## 2023-05-24 PROCEDURE — 1160F RVW MEDS BY RX/DR IN RCRD: CPT | Performed by: INTERNAL MEDICINE

## 2023-05-24 PROCEDURE — 99213 OFFICE O/P EST LOW 20 MIN: CPT | Performed by: INTERNAL MEDICINE

## 2023-05-24 PROCEDURE — 93306 TTE W/DOPPLER COMPLETE: CPT

## 2023-05-24 NOTE — PROGRESS NOTES
[facility] Cardiology    Patient ID: Albania Hernandez is a 72 y.o. female.  : 1950   Contact: [phone]    Encounter date: 2023    PCP: Kimberly Anglin FNP      Chief complaint:   Chief Complaint   Patient presents with   • severe aortic stenosis        Problem List:  1. Aortic valve disease:  a. LHC, 2019, [facility]: Nonobstructive CAD with 20-30% mid LAD lesion   b. Echo, 10/31/2019: Calcified AV, severe AS with BALDO 0.62cm2, mean gradient 70mmHg, max gradient 120mmHg, trace AI. Mild LVH, EF 55-60%, mild MR.   c. Class II VELAZQUEZ  d. SAW, 2019: Severe AS, mean gradient 71.2 mmHg, BALDO 0.5 cm2.  e. S/p TAVR, 2019, with 23 mm Garcia Antoni 3 pericardial prosthesis. Intra-op TAVR SAW showed mean PG 9 mmHg with BALDO 1.9 cm2.  f. Echo, 2020: EF 60%. S/p TAVR with 23 mm Garcia Antoni 3 pericardial prosthesis. Mean PG 15.0 mmHg, BALDO 1.2 cm2.  g. Echo, 2020: S/p TAVR with 23 mm Garcia Antoni 3 pericardial prosthesis. Mean PG 16 mmHg.   h. Echo, 10/28/2020: EF: 55%, There is a 23 mm Antoni 3 TAVR present. Peak velocity of the flow distal to the aortic valve is 341 cm/s. Aortic valve maximum pressure gradient is 46 mmHg. Aortic valve mean pressure gradient is 25 mmHg. Trace MR.   i. On Coumadin due to elevated gradients.   j. Echo, 2021: EF 55%. Borderline concentric LVH. 23 mm TAVR present. Mean PG 17 mmHg.   k. Echo, 2021: Estimated TAVR mean pressure gradient 14 mmHg  l. Echo, 2022; EF 60%. There is a 23 mm ANTONI 3 pericardial prosthesis present in the aortic position. Aortic valve mean pressure gradient is 17 mmHg. The last 2 studies have shown aortic valve gradients of 14 and 17 mmHg. Aortic valve area is 1.3 cm2.  m. Echo, 2023; AV mean gradient is 16 mmHg.   2. History of hiatal hernia, s/p repair   3. Arthritis  4. Osteoporosis   5. Borderline diabetic      No Known Allergies    Current Medications:    Current  "Outpatient Medications:   •  aspirin (aspirin) 81 MG EC tablet, Take 1 tablet by mouth Daily., Disp: , Rfl:   •  calcium carbonate (OS-SHAINA) 600 MG tablet, Take 1 tablet by mouth Daily., Disp: , Rfl:   •  Cetirizine HCl (ZYRTEC PO), Take 1 tablet by mouth Daily., Disp: , Rfl:     HPI    Albania Hernandez is a 72 y.o. female who presents today for a 12 month follow up of severe aortic stenosis and cardiac risk factors. Patient states she does not monitor her blood pressure at home. She states she is following up with orthopedics due to shoulder discomfort. She was advised she is borderline diabetic now. Patient does not have a regular exercise routine. 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 Patient denies chest pain, shortness of breath, orthopnea, palpitations, edema, dizziness, and syncope.      The following portions of the patient's history were reviewed and updated as appropriate: allergies, current medications and problem list.    Pertinent positives as listed in the HPI.  All other systems reviewed are negative.         Vitals:    05/24/23 1453 05/24/23 1508   BP: 148/74 128/70   BP Location: Left arm Left arm   Patient Position: Sitting Sitting   Pulse: 76    SpO2: 96%    Weight: 60.3 kg (133 lb)    Height: 167.6 cm (66\")        Physical Exam:  General: Alert and oriented.  Neck: Jugular venous pressure is within normal limits. Carotids have normal upstrokes without bruits.   Cardiovascular: Heart has a nondisplaced focal PMI. Regular rate and rhythm. No gallop or rub. 1-2/6 JESSIKA RUSB  Lungs: Clear, no rales or wheezes. Equal expansion is noted.   Extremities: Show no edema.  Skin: Warm and dry.  Neurologic: Nonfocal.     Diagnostic Data (reviewed with patient):    No recent laboratory studies available for review today.     Advance Care Planning   ACP discussion was held with the patient during this visit. Patient does not have an advance directive, declines further assistance.       Procedures      " Assessment:    ICD-10-CM ICD-9-CM   1. Severe aortic stenosis  I35.0 424.1   2. S/P TAVR (transcatheter aortic valve replacement)  Z95.2 V43.3         Plan:  1. Stable cardiac status.   2. Begin routine aerobic exercise for at least 30 minutes 5 days per week.   3. Limited echocardiogram ordered next year on same day as follow-up to reassess ejection fraction and TAVR aortic valve gradient.    4. Continue on aspirin 81 mg daily for antiplatelet therapy.    5. Continue all other current medications.  6. F/up in 12 months, sooner if needed.      Scribed for Veronica Rodney MD by Isela Perez. 5/24/2023 15:07 EDT         I Veronica Rodney MD personally performed the services described in this documentation as scribed by the above individual in my presence, and it is both accurate and complete.    Veronica Rodney MD, [facility]

## 2023-05-25 LAB
ASCENDING AORTA: 4 CM
BH CV ECHO MEAS - AO MAX PG: 22.6 MMHG
BH CV ECHO MEAS - AO MEAN PG: 14.3 MMHG
BH CV ECHO MEAS - AO ROOT AREA (BSA CORRECTED): 1.6 CM2
BH CV ECHO MEAS - AO ROOT DIAM: 2.6 CM
BH CV ECHO MEAS - AO V2 MAX: 218.8 CM/SEC
BH CV ECHO MEAS - AO V2 VTI: 54.6 CM
BH CV ECHO MEAS - AVA(I,D): 1.61 CM2
BH CV ECHO MEAS - EDV(CUBED): 79.5 ML
BH CV ECHO MEAS - EDV(MOD-SP2): 80.7 ML
BH CV ECHO MEAS - EDV(MOD-SP4): 80 ML
BH CV ECHO MEAS - EF(MOD-BP): 62.3 %
BH CV ECHO MEAS - EF(MOD-SP2): 59 %
BH CV ECHO MEAS - EF(MOD-SP4): 66.5 %
BH CV ECHO MEAS - ESV(CUBED): 22 ML
BH CV ECHO MEAS - ESV(MOD-SP2): 33.1 ML
BH CV ECHO MEAS - ESV(MOD-SP4): 26.8 ML
BH CV ECHO MEAS - FS: 34.9 %
BH CV ECHO MEAS - IVS/LVPW: 1 CM
BH CV ECHO MEAS - IVSD: 0.9 CM
BH CV ECHO MEAS - LA DIMENSION: 3.8 CM
BH CV ECHO MEAS - LAT PEAK E' VEL: 9 CM/SEC
BH CV ECHO MEAS - LV DIASTOLIC VOL/BSA (35-75): 47.8 CM2
BH CV ECHO MEAS - LV MASS(C)D: 123.3 GRAMS
BH CV ECHO MEAS - LV MAX PG: 8.1 MMHG
BH CV ECHO MEAS - LV MEAN PG: 4.3 MMHG
BH CV ECHO MEAS - LV SYSTOLIC VOL/BSA (12-30): 16 CM2
BH CV ECHO MEAS - LV V1 MAX: 142.3 CM/SEC
BH CV ECHO MEAS - LV V1 VTI: 31 CM
BH CV ECHO MEAS - LVIDD: 4.3 CM
BH CV ECHO MEAS - LVIDS: 2.8 CM
BH CV ECHO MEAS - LVOT AREA: 2.8 CM2
BH CV ECHO MEAS - LVOT DIAM: 1.9 CM
BH CV ECHO MEAS - LVPWD: 0.9 CM
BH CV ECHO MEAS - MED PEAK E' VEL: 7.9 CM/SEC
BH CV ECHO MEAS - MV A MAX VEL: 100.3 CM/SEC
BH CV ECHO MEAS - MV DEC SLOPE: 295.5 CM/SEC2
BH CV ECHO MEAS - MV DEC TIME: 0.33 MSEC
BH CV ECHO MEAS - MV E MAX VEL: 89.1 CM/SEC
BH CV ECHO MEAS - MV E/A: 0.89
BH CV ECHO MEAS - MV MAX PG: 4 MMHG
BH CV ECHO MEAS - MV MEAN PG: 2 MMHG
BH CV ECHO MEAS - MV P1/2T: 84.4 MSEC
BH CV ECHO MEAS - MV V2 VTI: 28.4 CM
BH CV ECHO MEAS - MVA(P1/2T): 2.6 CM2
BH CV ECHO MEAS - MVA(VTI): 3.1 CM2
BH CV ECHO MEAS - PA ACC TIME: 0.1 SEC
BH CV ECHO MEAS - PA PR(ACCEL): 35.4 MMHG
BH CV ECHO MEAS - SI(MOD-SP2): 28.4 ML/M2
BH CV ECHO MEAS - SI(MOD-SP4): 31.8 ML/M2
BH CV ECHO MEAS - SV(LVOT): 87.8 ML
BH CV ECHO MEAS - SV(MOD-SP2): 47.6 ML
BH CV ECHO MEAS - SV(MOD-SP4): 53.2 ML
BH CV ECHO MEAS - TAPSE (>1.6): 1.67 CM
BH CV ECHO MEASUREMENTS AVERAGE E/E' RATIO: 10.54
BH CV XLRA - RV BASE: 2.5 CM
BH CV XLRA - RV LENGTH: 5.2 CM
BH CV XLRA - RV MID: 2.2 CM
BH CV XLRA - TDI S': 8.7 CM/SEC
LEFT ATRIUM VOLUME INDEX: 28.4 ML/M2
LV EF 2D ECHO EST: 60 %
MAXIMAL PREDICTED HEART RATE: 148 BPM
STRESS TARGET HR: 126 BPM

## 2024-02-01 DIAGNOSIS — Z95.2 S/P TAVR (TRANSCATHETER AORTIC VALVE REPLACEMENT): ICD-10-CM

## 2024-02-01 DIAGNOSIS — I50.32 CHRONIC DIASTOLIC CONGESTIVE HEART FAILURE: ICD-10-CM

## 2024-02-01 DIAGNOSIS — I35.9 AORTIC VALVE DISORDER: ICD-10-CM

## 2024-02-01 DIAGNOSIS — I35.0 SEVERE AORTIC STENOSIS: Primary | ICD-10-CM

## 2024-05-29 ENCOUNTER — HOSPITAL ENCOUNTER (OUTPATIENT)
Dept: CARDIOLOGY | Facility: HOSPITAL | Age: 74
Discharge: HOME | End: 2024-05-29
Admitting: INTERNAL MEDICINE
Payer: MEDICARE

## 2024-05-29 ENCOUNTER — OFFICE VISIT (OUTPATIENT)
Dept: CARDIOLOGY | Facility: CLINIC | Age: 74
End: 2024-05-29
Payer: MEDICARE

## 2024-05-29 VITALS
OXYGEN SATURATION: 95 % | WEIGHT: 129.8 LBS | HEIGHT: 62 IN | BODY MASS INDEX: 23.89 KG/M2 | DIASTOLIC BLOOD PRESSURE: 83 MMHG | SYSTOLIC BLOOD PRESSURE: 137 MMHG | HEART RATE: 59 BPM

## 2024-05-29 VITALS
WEIGHT: 133 LBS | SYSTOLIC BLOOD PRESSURE: 123 MMHG | HEIGHT: 66 IN | DIASTOLIC BLOOD PRESSURE: 94 MMHG | BODY MASS INDEX: 21.38 KG/M2

## 2024-05-29 DIAGNOSIS — I10 PRIMARY HYPERTENSION: ICD-10-CM

## 2024-05-29 DIAGNOSIS — I50.32 CHRONIC DIASTOLIC CONGESTIVE HEART FAILURE: ICD-10-CM

## 2024-05-29 DIAGNOSIS — I35.9 AORTIC VALVE DISORDER: ICD-10-CM

## 2024-05-29 DIAGNOSIS — I35.0 SEVERE AORTIC STENOSIS: Primary | ICD-10-CM

## 2024-05-29 DIAGNOSIS — Z95.2 S/P TAVR (TRANSCATHETER AORTIC VALVE REPLACEMENT): ICD-10-CM

## 2024-05-29 DIAGNOSIS — E78.5 DYSLIPIDEMIA: ICD-10-CM

## 2024-05-29 DIAGNOSIS — I35.0 SEVERE AORTIC STENOSIS: ICD-10-CM

## 2024-05-29 LAB
ASCENDING AORTA: 3.5 CM
BH CV ECHO LEFT VENTRICLE GLOBAL LONGITUDINAL STRAIN: -25.5 %
BH CV ECHO MEAS - AO MAX PG: 29.6 MMHG
BH CV ECHO MEAS - AO MEAN PG: 15.6 MMHG
BH CV ECHO MEAS - AO ROOT DIAM: 2.9 CM
BH CV ECHO MEAS - AO V2 MAX: 263 CM/SEC
BH CV ECHO MEAS - AO V2 VTI: 58.1 CM
BH CV ECHO MEAS - AVA(I,D): 1.5 CM2
BH CV ECHO MEAS - EF(MOD-BP): 59.9 %
BH CV ECHO MEAS - IVS/LVPW: 1.02 CM
BH CV ECHO MEAS - IVSD: 0.96 CM
BH CV ECHO MEAS - LA DIMENSION: 3.7 CM
BH CV ECHO MEAS - LV MAX PG: 6.8 MMHG
BH CV ECHO MEAS - LV MEAN PG: 3.3 MMHG
BH CV ECHO MEAS - LV V1 MAX: 130 CM/SEC
BH CV ECHO MEAS - LV V1 VTI: 28.5 CM
BH CV ECHO MEAS - LVIDD: 3.7 CM
BH CV ECHO MEAS - LVIDS: 2.6 CM
BH CV ECHO MEAS - LVOT DIAM: 1.9 CM
BH CV ECHO MEAS - LVPWD: 0.94 CM
BH CV VAS BP LEFT ARM: NORMAL MMHG
LV EF 2D ECHO EST: 60 %

## 2024-05-29 PROCEDURE — 93325 DOPPLER ECHO COLOR FLOW MAPG: CPT

## 2024-05-29 PROCEDURE — 93321 DOPPLER ECHO F-UP/LMTD STD: CPT

## 2024-05-29 PROCEDURE — 93308 TTE F-UP OR LMTD: CPT

## 2024-05-29 RX ORDER — LISINOPRIL 10 MG/1
10 TABLET ORAL
COMMUNITY
Start: 2024-02-16

## 2024-05-29 NOTE — PROGRESS NOTES
[facility] Cardiology    Patient ID: Albania Hernandez is a 73 y.o. female.  : 1950   Contact: [phone]    Encounter date: 2024    PCP: Kimberly Anglin FNP      Chief complaint:   Chief Complaint   Patient presents with    Severe aortic stenosis       Problem List:  Aortic valve disease:  C, 2019, [facility]: Nonobstructive CAD with 20-30% mid LAD lesion   Echo, 10/31/2019: Calcified AV, severe AS with BALDO 0.62cm2, mean gradient 70mmHg, max gradient 120mmHg, trace AI. Mild LVH, EF 55-60%, mild MR.   Class II VELAZQUEZ  SAW, 2019: Severe AS, mean gradient 71.2 mmHg, BALDO 0.5 cm2.  S/p TAVR, 2019, with 23 mm Garcia Antoni 3 pericardial prosthesis. Intra-op TAVR SAW showed mean PG 9 mmHg with BALDO 1.9 cm2.  Echo, 2020: EF 60%. S/p TAVR with 23 mm Garcia Antoni 3 pericardial prosthesis. Mean PG 15.0 mmHg, BALDO 1.2 cm2.  Echo, 2020: S/p TAVR with 23 mm Garcia Antoni 3 pericardial prosthesis. Mean PG 16 mmHg.   Echo, 10/28/2020: EF: 55%, There is a 23 mm Antoni 3 TAVR present. Peak velocity of the flow distal to the aortic valve is 341 cm/s. Aortic valve maximum pressure gradient is 46 mmHg. Aortic valve mean pressure gradient is 25 mmHg. Trace MR.   Temporarily placed on Coumadin due to elevated gradients but discontinued 2021 due to improvement of gradients  Echo, 2021: EF 55%. Borderline concentric LVH. 23 mm TAVR present. Mean PG 17 mmHg.   Echo, 2021: Estimated TAVR mean pressure gradient 14 mmHg  Echo, 2022; EF 60%. There is a 23 mm ANTONI 3 pericardial prosthesis present in the aortic position. Aortic valve mean pressure gradient is 17 mmHg. The last 2 studies have shown aortic valve gradients of 14 and 17 mmHg. Aortic valve area is 1.3 cm2.  Echo, 2023, AV mean gradient is 16 mmHg.   Echo 2024: EF 60%, stable 23 mm ANTONI 3 prosthetic aortic valve present, mean gradient 16 mmHg, no significant change, BALDO 1.5  "cm²  History of hiatal hernia, s/p repair 2016  Arthritis  Osteoporosis   Borderline diabetic      No Known Allergies    Current Medications:    Current Outpatient Medications:     aspirin (aspirin) 81 MG EC tablet, Take 1 tablet by mouth Daily., Disp: , Rfl:     calcium carbonate (OS-SHAINA) 600 MG tablet, Take 1 tablet by mouth Daily., Disp: , Rfl:     Cetirizine HCl (ZYRTEC PO), Take 1 tablet by mouth Daily., Disp: , Rfl:     lisinopril (PRINIVIL,ZESTRIL) 10 MG tablet, 1 tablet., Disp: , Rfl:     HPI    Albania Hernandez is a 73 y.o. female who presents today for a 1 year follow up of severe aortic stenosis and cardiac risk factors. Since last visit, patient overall has been doing well.  She remains active without any chest pain, shortness of breath or lower extremity edema.  Patient does state however blood pressure over the last couple of months has been a little bit elevated.  Her PCP placed her on lisinopril 10 mg daily and her blood pressure has been controlled.  She monitors it on a daily basis and it is normally in the low 130s.  Patient has had blood work since her last appointment with her cholesterol checked.  We have blood work from September but patient states that she has had sooner blood work.      The following portions of the patient's history were reviewed and updated as appropriate: allergies, current medications and problem list.    Pertinent positives as listed in the HPI.  All other systems reviewed are negative.         Vitals:    05/29/24 1411   BP: 137/83   Pulse: 59   SpO2: 95%   Weight: 58.9 kg (129 lb 12.8 oz)   Height: 157.5 cm (62\")       Physical Exam:  General: Alert and oriented.  Neck: Jugular venous pressure is within normal limits. Carotids have normal upstrokes without bruits.   Cardiovascular: Heart has a nondisplaced focal PMI. Regular rate and rhythm.  1/6 systolic ejection murmur heard best in the right upper sternal border  Lungs: Clear, no rales or wheezes. Equal expansion is " noted.   Extremities: Show no edema.  Skin: Warm and dry.  Neurologic: Nonfocal.     Diagnostic Data (reviewed with patient):  9/2023  Total cholesterol 189, HDL 55, , trig 68  Glucose 93, sodium 141, potassium 3.9, BUN 17, creatinine 0.7, AST 28, ALT 21    Advance Care Planning   ACP discussion was declined by the patient. Patient does not have an advance directive, declines further assistance.             Assessment:    ICD-10-CM ICD-9-CM   1. Severe aortic stenosis  I35.0 424.1   2. S/P TAVR (transcatheter aortic valve replacement)  Z95.2 V43.3   3. Primary hypertension  I10 401.9   4. Dyslipidemia  E78.5 272.4         Plan:  Patient has stable cardiac status.  Echocardiogram done today is stable with no changes to the gradient across the valve. At this time patient will not have to have repeat Echo next year unless she becomes symptomatic since she has had a stable gradient the last two years.   Continue on aspirin 81 mg for antiplatelet therapy.   Continue lisinopril 10 mg daily for hypertension.  Discussed diet changes including reducing saturated fat to help with LDL cholesterol.  Continue all other current medications.  F/up in 12 months, sooner if needed.    Vanessa Villanueva PA-C

## (undated) DEVICE — SUT SILK 2/0 TIES 18IN A185H

## (undated) DEVICE — COVER,TABLE,HVY DUTY,60"X90",STRL: Brand: MEDLINE

## (undated) DEVICE — SKIN AFFIX SURG ADHESIVE 72/CS 0.55ML: Brand: MEDLINE

## (undated) DEVICE — CLTH CLENS READYCLEANSE PERI CARE PK/5

## (undated) DEVICE — SUT PROLN 4/0 BB D/A 36IN 8581H

## (undated) DEVICE — SUT PROLN 7/0 BV1 24IN 4PK M8702

## (undated) DEVICE — ANGIOGRAPHIC CATHETER: Brand: EXPO™

## (undated) DEVICE — GW AMPLTZ SUPERSTIFF STR .035IN 180CM

## (undated) DEVICE — PINNACLE INTRODUCER SHEATH: Brand: PINNACLE

## (undated) DEVICE — PK HEART OPN 10

## (undated) DEVICE — GW CERBRL FC PTFE STD/STR .035 260CM

## (undated) DEVICE — SHEET, DRAPE, SPLIT, STERILE: Brand: MEDLINE

## (undated) DEVICE — PAD E/S GRND SGL/FOIL 9FT/CORD DISP

## (undated) DEVICE — SI AVANTI+ 7F STD W/GW  NO OBT: Brand: AVANTI

## (undated) DEVICE — SUT PROLN 6/0 C1 D/A 30IN 8706H

## (undated) DEVICE — 3M™ STERI-DRAPE™ FLUOROSCOPE DRAPE, 10 PER CARTON / 4 CARTONS PER CASE, 1012: Brand: STERI-DRAPE™

## (undated) DEVICE — GLV SURG SENSICARE MICRO PF LF 7 STRL

## (undated) DEVICE — SUT MNCRYL PLS ANTIB UD 4/0 PS2 18IN

## (undated) DEVICE — GLIDEX™ COATED HYDROPHILIC GUIDEWIRE: Brand: MAGIC TORQUE™

## (undated) DEVICE — ANTIBACTERIAL UNDYED BRAIDED (POLYGLACTIN 910), SYNTHETIC ABSORBABLE SUTURE: Brand: COATED VICRYL

## (undated) DEVICE — CVR HNDL LT SURG ACCSSRY BLU STRL

## (undated) DEVICE — GW J TP FIX CORE .035 150

## (undated) DEVICE — SUCTION CANISTER, 2500CC, RIGID: Brand: DEROYAL

## (undated) DEVICE — SAFESECURE,SECUREMENT,FOLEY CATH,STERILE: Brand: MEDLINE

## (undated) DEVICE — SUT SILK 4/0 TIES 18IN A183H

## (undated) DEVICE — DRSNG WND BORDR/ADHS NONADHR/GZ LF 4X14IN STRL

## (undated) DEVICE — PERCLOSE PROGLIDE™ SUTURE-MEDIATED CLOSURE SYSTEM: Brand: PERCLOSE PROGLIDE™

## (undated) DEVICE — 3M™ TEGADERM™ CHG DRESSING 25/CARTON 4 CARTONS/CASE 1658: Brand: TEGADERM™

## (undated) DEVICE — ELECTRD DEFIB M/FUNC PROPADZ STRL 2PK

## (undated) DEVICE — CATH DIAG EXPO .056 AL1 6F 100CM

## (undated) DEVICE — SWAN-GANZ THERMODILUTION CATHETER: Brand: SWAN-GANZ

## (undated) DEVICE — Device

## (undated) DEVICE — SLV REPOSTNG CATH STRL 60CM

## (undated) DEVICE — BOWL UTIL STRL 32OZ

## (undated) DEVICE — SUT SILK 3/0 TIES 18IN A184H

## (undated) DEVICE — ELECTRD BLD 1P STD SS 3/32X2.44IN

## (undated) DEVICE — PK CATH CARD 10

## (undated) DEVICE — CABL PACE ATRIAL PT BLU

## (undated) DEVICE — CATH PACE PACEL BIPOL 5F110CM

## (undated) DEVICE — GW SAFARI2 PRESH XSM CRV .035IN 3.2X2.9X275CM